# Patient Record
Sex: FEMALE | Race: WHITE | NOT HISPANIC OR LATINO | Employment: UNEMPLOYED | URBAN - METROPOLITAN AREA
[De-identification: names, ages, dates, MRNs, and addresses within clinical notes are randomized per-mention and may not be internally consistent; named-entity substitution may affect disease eponyms.]

---

## 2017-01-20 ENCOUNTER — TRANSCRIBE ORDERS (OUTPATIENT)
Dept: ADMINISTRATIVE | Facility: HOSPITAL | Age: 32
End: 2017-01-20

## 2017-01-20 DIAGNOSIS — N63.0 BREAST NODULE: Primary | ICD-10-CM

## 2017-01-26 ENCOUNTER — HOSPITAL ENCOUNTER (OUTPATIENT)
Dept: ULTRASOUND IMAGING | Facility: HOSPITAL | Age: 32
Discharge: HOME | End: 2017-01-26
Attending: NURSE PRACTITIONER | Admitting: NURSE PRACTITIONER

## 2017-01-26 DIAGNOSIS — N63.0 BREAST NODULE: ICD-10-CM

## 2017-01-26 PROCEDURE — 76642 ULTRASOUND BREAST LIMITED: CPT | Performed by: RADIOLOGY

## 2017-01-26 PROCEDURE — 76642 ULTRASOUND BREAST LIMITED: CPT

## 2017-02-16 ENCOUNTER — TRANSCRIBE ORDERS (OUTPATIENT)
Dept: LAB | Facility: HOSPITAL | Age: 32
End: 2017-02-16

## 2017-02-16 ENCOUNTER — LAB (OUTPATIENT)
Dept: LAB | Facility: HOSPITAL | Age: 32
End: 2017-02-16

## 2017-02-16 DIAGNOSIS — Z34.83 PRENATAL CARE, SUBSEQUENT PREGNANCY, THIRD TRIMESTER: Primary | ICD-10-CM

## 2017-02-16 DIAGNOSIS — Z34.83 PRENATAL CARE, SUBSEQUENT PREGNANCY, THIRD TRIMESTER: ICD-10-CM

## 2017-02-16 LAB
BLD GP AB SCN SERPL QL: NEGATIVE
DEPRECATED RDW RBC AUTO: 47.2 FL (ref 37–54)
ERYTHROCYTE [DISTWIDTH] IN BLOOD BY AUTOMATED COUNT: 13.7 % (ref 11.3–14.5)
GLUCOSE 1H P 100 G GLC PO SERPL-MCNC: 178 MG/DL (ref 65–199)
HCT VFR BLD AUTO: 35.9 % (ref 34.5–44)
HGB BLD-MCNC: 11.7 G/DL (ref 11.5–15.5)
MCH RBC QN AUTO: 30.5 PG (ref 27–31)
MCHC RBC AUTO-ENTMCNC: 32.6 G/DL (ref 32–36)
MCV RBC AUTO: 93.5 FL (ref 80–99)
PLATELET # BLD AUTO: 236 10*3/MM3 (ref 150–450)
PMV BLD AUTO: 10.1 FL (ref 6–12)
RBC # BLD AUTO: 3.84 10*6/MM3 (ref 3.89–5.14)
WBC NRBC COR # BLD: 8.92 10*3/MM3 (ref 3.5–10.8)

## 2017-02-16 PROCEDURE — 85027 COMPLETE CBC AUTOMATED: CPT | Performed by: NURSE PRACTITIONER

## 2017-02-16 PROCEDURE — 86850 RBC ANTIBODY SCREEN: CPT

## 2017-02-16 PROCEDURE — 82950 GLUCOSE TEST: CPT | Performed by: NURSE PRACTITIONER

## 2017-02-16 PROCEDURE — 36415 COLL VENOUS BLD VENIPUNCTURE: CPT

## 2017-02-23 ENCOUNTER — TRANSCRIBE ORDERS (OUTPATIENT)
Dept: LAB | Facility: HOSPITAL | Age: 32
End: 2017-02-23

## 2017-02-23 ENCOUNTER — LAB (OUTPATIENT)
Dept: LAB | Facility: HOSPITAL | Age: 32
End: 2017-02-23

## 2017-02-23 DIAGNOSIS — O99.810 ABNORMAL MATERNAL GLUCOSE TOLERANCE, ANTEPARTUM: Primary | ICD-10-CM

## 2017-02-23 DIAGNOSIS — O99.810 ABNORMAL MATERNAL GLUCOSE TOLERANCE, ANTEPARTUM: ICD-10-CM

## 2017-02-23 LAB
GLUCOSE 1H P 100 G GLC PO SERPL-MCNC: 214 MG/DL (ref 65–199)
GLUCOSE 2H P 100 G GLC PO SERPL-MCNC: 168 MG/DL (ref 65–139)
GLUCOSE 3H P 100 G GLC PO SERPL-MCNC: 145 MG/DL (ref 65–109)
GLUCOSE BLDC-MCNC: 111 MG/DL (ref 75–125)
GLUCOSE P FAST SERPL-MCNC: 116 MG/DL (ref 65–99)

## 2017-02-23 PROCEDURE — 82952 GTT-ADDED SAMPLES: CPT | Performed by: ADVANCED PRACTICE MIDWIFE

## 2017-02-23 PROCEDURE — 36415 COLL VENOUS BLD VENIPUNCTURE: CPT

## 2017-02-23 PROCEDURE — 82962 GLUCOSE BLOOD TEST: CPT | Performed by: ADVANCED PRACTICE MIDWIFE

## 2017-02-23 PROCEDURE — 82951 GLUCOSE TOLERANCE TEST (GTT): CPT | Performed by: ADVANCED PRACTICE MIDWIFE

## 2017-02-24 ENCOUNTER — TRANSCRIBE ORDERS (OUTPATIENT)
Dept: ENDOCRINOLOGY | Facility: CLINIC | Age: 32
End: 2017-02-24

## 2017-02-24 DIAGNOSIS — O24.419 GESTATIONAL DIABETES MELLITUS IN PREGNANCY, UNSPECIFIED CONTROL: Primary | ICD-10-CM

## 2017-02-27 ENCOUNTER — TELEPHONE (OUTPATIENT)
Dept: INTERNAL MEDICINE | Facility: CLINIC | Age: 32
End: 2017-02-27

## 2017-02-27 ENCOUNTER — OFFICE VISIT (OUTPATIENT)
Dept: ENDOCRINOLOGY | Facility: CLINIC | Age: 32
End: 2017-02-27

## 2017-02-27 VITALS — DIASTOLIC BLOOD PRESSURE: 74 MMHG | SYSTOLIC BLOOD PRESSURE: 126 MMHG | HEART RATE: 97 BPM | OXYGEN SATURATION: 98 %

## 2017-02-27 DIAGNOSIS — N63.0 BREAST LUMP IN FEMALE: ICD-10-CM

## 2017-02-27 DIAGNOSIS — O24.419 GESTATIONAL DIABETES MELLITUS (GDM), ANTEPARTUM, GESTATIONAL DIABETES METHOD OF CONTROL UNSPECIFIED: Primary | ICD-10-CM

## 2017-02-27 DIAGNOSIS — O24.410 DIET CONTROLLED GESTATIONAL DIABETES MELLITUS (GDM) IN THIRD TRIMESTER: ICD-10-CM

## 2017-02-27 PROBLEM — K21.9 GASTROESOPHAGEAL REFLUX DISEASE WITHOUT ESOPHAGITIS: Status: ACTIVE | Noted: 2017-02-27

## 2017-02-27 PROBLEM — F32.A DEPRESSION: Status: ACTIVE | Noted: 2017-02-27

## 2017-02-27 PROBLEM — M19.90 ARTHRITIS: Status: ACTIVE | Noted: 2017-02-27

## 2017-02-27 PROBLEM — G43.009 MIGRAINE WITHOUT AURA AND WITHOUT STATUS MIGRAINOSUS, NOT INTRACTABLE: Status: ACTIVE | Noted: 2017-02-27

## 2017-02-27 PROBLEM — O26.52 MATERNAL HYPOTENSION SYNDROME IN SECOND TRIMESTER: Status: ACTIVE | Noted: 2017-02-27

## 2017-02-27 PROBLEM — M65.4 DE QUERVAIN'S DISEASE (RADIAL STYLOID TENOSYNOVITIS): Status: ACTIVE | Noted: 2017-02-27

## 2017-02-27 PROBLEM — L30.1 CHEIROPODOPOMPHOLYX: Status: ACTIVE | Noted: 2017-02-27

## 2017-02-27 PROBLEM — D64.9 ANEMIA: Status: ACTIVE | Noted: 2017-02-27

## 2017-02-27 LAB — HBA1C MFR BLD: 5.4 %

## 2017-02-27 PROCEDURE — 83036 HEMOGLOBIN GLYCOSYLATED A1C: CPT | Performed by: INTERNAL MEDICINE

## 2017-02-27 PROCEDURE — 99243 OFF/OP CNSLTJ NEW/EST LOW 30: CPT | Performed by: INTERNAL MEDICINE

## 2017-02-27 RX ORDER — BLOOD-GLUCOSE METER
EACH MISCELLANEOUS
Qty: 1 KIT | Refills: 0 | Status: SHIPPED | OUTPATIENT
Start: 2017-02-27 | End: 2019-10-23

## 2017-02-27 RX ORDER — LANCETS 33 GAUGE
EACH MISCELLANEOUS
Qty: 100 EACH | Refills: 5 | Status: SHIPPED | OUTPATIENT
Start: 2017-02-27 | End: 2017-02-27

## 2017-02-27 RX ORDER — LANCETS
EACH MISCELLANEOUS
Qty: 100 EACH | Refills: 5 | Status: SHIPPED | OUTPATIENT
Start: 2017-02-27 | End: 2019-10-23

## 2017-02-27 RX ORDER — CHLORPHENIR/PHENYLEPH/ASPIRIN 2-7.8-325
1 TABLET, EFFERVESCENT ORAL DAILY
Qty: 50 STRIP | Refills: 1 | Status: SHIPPED | OUTPATIENT
Start: 2017-02-27 | End: 2017-04-23 | Stop reason: HOSPADM

## 2017-02-27 NOTE — ASSESSMENT & PLAN NOTE
She will start testing sugars and follow diet  She will email sugar starting Sunday  Goals and rationale for goals discussed  Large first child but went post dates  Blood sugar average 5.4% reviewed with patient   Risk of higher sugars to her and baby discussed

## 2017-02-27 NOTE — TELEPHONE ENCOUNTER
----- Message from Ying Workman sent at 2/27/2017  2:33 PM EST -----  Contact: PATIENT   RE: PA FOR TEST STRIPS    FYI-    PATIENT CALLED STATING THAT HER PHARMACY WILL BE SENDING FOR A PA ON THE TEST STRIPS THAT DR BROOKS ORDERED TODAY. THE LANCETS WERE APPROVED BUT THE TEST STRIPS WERE NOT.

## 2017-02-27 NOTE — PROGRESS NOTES
Maren Ray 31 y.o.  [facility]:Establish Care (New patient being seen at the request of Treva Clemens CNM for a consultation regarding gestational diabetes, [facility] 2017)    [place]: Establish Care (New patient being seen at the request of Treva Clemens CNM for a consultation regarding gestational diabetes, [facility] 2017)    Blood sugars reviewed  Results for orders placed or performed in visit on 17   POC Glycosylated Hemoglobin (Hb A1C)   Result Value Ref Range    Hemoglobin A1C 5.4 %     High fasting and 1 hour, 2 hour elevations   Has family h/o DM in Father and Brother  Breast lump noted on ROS- last mamm - currently pregnant   Is  - first child 9 lb 5 oz post dates   We discussed the diagnosis of gestational diabetes and reviewed her glucose levels/ glucose tolerance test.  We discussed the concept of carbohydrate awaredness and carbohydrate content of meals was discussed.  Impact of sweets and other carb containing foods and types of foods typically high in carbohydrates was discussed. Overall guidelines for meal planning related to specific carbohydrate content of meals was discussed and she was provided recommendations about carbohydrates recommended with meals and with snacks.  She was asked to give up any sugared drinks, and avoid breakfast cereal.  Blood sugar testing fasting and after each meal was reviewed and the patient was taught to use a glucometer to test her blood sugar.  Normal values for blood sugar monitoring were discussed as well, with fasting level less than 95, 1 hour pp blood sugar less than 140 and 2 hour blood sugar less than 120 recommended.  Risks related to poor control of blood sugars during pregnancy were discussed with a focus on specific risks to both her and the baby.  Risks discussed include macrosomia (large birthweight), fetal lung immaturity with respiratory distress and low oxygen level, stillbirth, low blood sugar after delivery, high bilirubin/jaundice, and  hypocalcemia.  Use of medications during pregnancy (eg metformin, glyburide and insulin) was discussed.  Her long term risks (outside of pregnancy) for development of Diabetes Mellitus were reviewed and we discussed the importance of healthy lifestyle now and in the future to help reduce the risk of progression to diabetes.  She will begin testing sugars fasting and 2 hours after meals and will fax blood sugars weekly to [email or web address]  We will plan to see her back in 3-4 weeks, or sooner if problems or questions.  Thank you for this referral and please do not hesitate to call for any problems or questions.      Allergies   Allergen Reactions   • Sulfa Antibiotics Angioedema       Current Outpatient Prescriptions:   •  Acetone, Urine, Test (KETONE TEST) strip, 1 strip by In Vitro route Daily. Test urine ketones daily in AM, Disp: 50 strip, Rfl: 1  •  glucose blood (ONETOUCH VERIO) test strip, Test blood sugars QID, Disp: 150 each, Rfl: 5  •  ONETOUCH DELICA LANCETS 33G misc, Test blood sugars QID, Disp: 100 each, Rfl: 5  Patient Active Problem List    Diagnosis   • De Quervain's disease (radial styloid tenosynovitis) [M65.4]     Overview Note:     Description: Continue thumb spica splint. Will send prescription for diclofenac. Patient defers referral to orthopedic hand. If pain persists she can call the clinic and we will refer to ortho hand.     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]   • Gastroesophageal reflux disease without esophagitis [K21.9]   • Anemia [D64.9]   • Arthritis [M19.90]   • Depression [F32.9]   • Migraine without aura and without status migrainosus, not intractable [G43.009]   • Maternal hypotension syndrome in second trimester [O26.52]   • Diet controlled gestational diabetes mellitus (GDM) in third trimester [O24.410]     Assessment & Plan Note:     She will start testing sugars and follow diet  She will email sugar starting Sunday  Goals and rationale for goals discussed  Large first child  but went post dates  Blood sugar average 5.4% reviewed with patient   Risk of higher sugars to her and baby discussed     • Breast lump in female [N63]     Assessment & Plan Note:     During pregnancy- is s/p u/s       Review of Systems   Constitutional: Positive for fatigue. Negative for activity change, appetite change, chills, diaphoresis, fever and unexpected weight change.        Poor appetite   HENT: Negative for congestion, dental problem, drooling, ear discharge, ear pain, facial swelling, hearing loss, mouth sores, nosebleeds, postnasal drip, rhinorrhea, sinus pressure, sneezing, sore throat, tinnitus, trouble swallowing and voice change.    Eyes: Negative for photophobia, pain, discharge, redness, itching and visual disturbance.        Dry eyes   Respiratory: Negative for apnea, cough, choking, chest tightness, shortness of breath, wheezing and stridor.    Cardiovascular: Positive for leg swelling. Negative for chest pain and palpitations.        Leg cramps, palpitations    Gastrointestinal: Positive for abdominal pain, constipation, diarrhea and nausea. Negative for abdominal distention, anal bleeding, blood in stool, rectal pain and vomiting.        Colonoscopy 2011   Endocrine: Negative for cold intolerance, heat intolerance, polydipsia, polyphagia and polyuria.   Genitourinary: Positive for menstrual problem. Negative for decreased urine volume, difficulty urinating, dysuria, enuresis, flank pain, frequency, genital sores, hematuria and urgency.        31 weeks pregnant  Lump in right breast - more prominent - had u/s sev weeks ago- prominent but due to glandular tissue    Musculoskeletal: Positive for arthralgias and myalgias. Negative for back pain, gait problem, joint swelling, neck pain and neck stiffness.   Skin: Negative for color change, pallor, rash and wound.   Allergic/Immunologic: Negative for environmental allergies, food allergies and immunocompromised state.   Neurological: Positive for  weakness and headaches. Negative for dizziness, tremors, seizures, syncope, facial asymmetry, speech difficulty, light-headedness and numbness.        Dizziness   Hematological: Negative for adenopathy. Does not bruise/bleed easily.        Easy bruising    Psychiatric/Behavioral: Negative for agitation, behavioral problems, confusion, decreased concentration, dysphoric mood, hallucinations, self-injury, sleep disturbance and suicidal ideas. The patient is not nervous/anxious and is not hyperactive.      Social History     Social History   • Marital status:      Spouse name: N/A   • Number of children: N/A   • Years of education: N/A     Occupational History   • Not on file.     Social History Main Topics   • Smoking status: Never Smoker   • Smokeless tobacco: Never Used   • Alcohol use No   • Drug use: No   • Sexual activity: Defer     Other Topics Concern   • Not on file     Social History Narrative   • No narrative on file     Family History   Problem Relation Age of Onset   • Stroke Mother    • Hypotension Mother    • Cancer Father    • Diabetes Father    • Hypertension Father    • Diabetes Brother    • Bipolar disorder Brother    • Cancer Paternal Grandmother    • Heart disease Paternal Grandmother    • Ovarian cancer Paternal Grandmother 70   • Diabetes Paternal Grandfather    • Cancer Paternal Grandfather      Visit Vitals   • /74   • Pulse 97   • LMP  (LMP Unknown)   • SpO2 98%     Physical Exam   Constitutional: She is oriented to person, place, and time. She appears well-developed and well-nourished.   HENT:   Head: Normocephalic and atraumatic.   Nose: Nose normal.   Mouth/Throat: Oropharynx is clear and moist.   Eyes: Conjunctivae, EOM and lids are normal. Pupils are equal, round, and reactive to light.   Neck: Trachea normal and normal range of motion. Neck supple. Carotid bruit is not present. No tracheal deviation present. No thyroid mass and no thyromegaly present.   Cardiovascular:  Normal rate, regular rhythm, normal heart sounds and intact distal pulses.  Exam reveals no gallop and no friction rub.    No murmur heard.  Pulmonary/Chest: Effort normal and breath sounds normal. No respiratory distress. She has no wheezes.   Musculoskeletal: Normal range of motion. She exhibits no edema or deformity.   Lymphadenopathy:     She has no cervical adenopathy.   Neurological: She is alert and oriented to person, place, and time. She has normal reflexes. She displays normal reflexes. No cranial nerve deficit.   Skin: Skin is warm and dry. No rash noted. No cyanosis or erythema. Nails show no clubbing.   Psychiatric: She has a normal mood and affect. Her speech is normal and behavior is normal. Judgment and thought content normal. Cognition and memory are normal.   Nursing note and vitals reviewed.    Results for orders placed or performed in visit on 02/27/17   POC Glycosylated Hemoglobin (Hb A1C)   Result Value Ref Range    Hemoglobin A1C 5.4 %     Problem List Items Addressed This Visit        Endocrine    Diet controlled gestational diabetes mellitus (GDM) in third trimester     She will start testing sugars and follow diet  She will email sugar starting Sunday  Goals and rationale for goals discussed  Large first child but went post dates  Blood sugar average 5.4% reviewed with patient   Risk of higher sugars to her and baby discussed            Other    Breast lump in female     During pregnancy- is s/p u/s           Other Visit Diagnoses     Gestational diabetes mellitus (GDM), antepartum, gestational diabetes method of control unspecified    -  Primary    Relevant Orders    POC Glycosylated Hemoglobin (Hb A1C) (Completed)        Return in about 4 weeks (around 3/27/2017) for Recheck 30 min .      Dulce Rodriguez MD

## 2017-03-08 ENCOUNTER — TELEPHONE (OUTPATIENT)
Dept: ENDOCRINOLOGY | Facility: CLINIC | Age: 32
End: 2017-03-08

## 2017-03-08 RX ORDER — GLYBURIDE 2.5 MG/1
TABLET ORAL
Qty: 90 TABLET | Refills: 3 | Status: SHIPPED | OUTPATIENT
Start: 2017-03-08 | End: 2017-04-23 | Stop reason: HOSPADM

## 2017-03-16 ENCOUNTER — LAB (OUTPATIENT)
Dept: LAB | Facility: HOSPITAL | Age: 32
End: 2017-03-16

## 2017-03-16 ENCOUNTER — TRANSCRIBE ORDERS (OUTPATIENT)
Dept: LAB | Facility: HOSPITAL | Age: 32
End: 2017-03-16

## 2017-03-16 DIAGNOSIS — Z3A.33 33 WEEKS GESTATION OF PREGNANCY: ICD-10-CM

## 2017-03-16 DIAGNOSIS — Z34.83 PRENATAL CARE, SUBSEQUENT PREGNANCY, THIRD TRIMESTER: ICD-10-CM

## 2017-03-16 DIAGNOSIS — Z3A.33 33 WEEKS GESTATION OF PREGNANCY: Primary | ICD-10-CM

## 2017-03-16 LAB — TSH SERPL DL<=0.05 MIU/L-ACNC: 1.82 MIU/ML (ref 0.35–5.35)

## 2017-03-16 PROCEDURE — 36415 COLL VENOUS BLD VENIPUNCTURE: CPT

## 2017-03-16 PROCEDURE — 84443 ASSAY THYROID STIM HORMONE: CPT | Performed by: NURSE PRACTITIONER

## 2017-03-22 PROBLEM — L30.1 DYSHIDROTIC ECZEMA: Status: ACTIVE | Noted: 2017-03-22

## 2017-03-31 ENCOUNTER — OFFICE VISIT (OUTPATIENT)
Dept: ENDOCRINOLOGY | Facility: CLINIC | Age: 32
End: 2017-03-31

## 2017-03-31 VITALS
SYSTOLIC BLOOD PRESSURE: 110 MMHG | DIASTOLIC BLOOD PRESSURE: 72 MMHG | OXYGEN SATURATION: 98 % | HEART RATE: 85 BPM | BODY MASS INDEX: 45.99 KG/M2 | WEIGHT: 293 LBS | HEIGHT: 67 IN

## 2017-03-31 DIAGNOSIS — O24.410 DIET CONTROLLED GESTATIONAL DIABETES MELLITUS (GDM) IN THIRD TRIMESTER: Primary | ICD-10-CM

## 2017-03-31 PROCEDURE — 99213 OFFICE O/P EST LOW 20 MIN: CPT | Performed by: INTERNAL MEDICINE

## 2017-03-31 NOTE — ASSESSMENT & PLAN NOTE
Fasting and pp sugars are higher related to recent enteritis  Discussed titration of glyburide and goal no more than 2 over goal per week  email Sunday with update and discussed increase to 2.5 mg at breakfast and also as needed for higher sugar   email weekly - f/u 10 weeks

## 2017-03-31 NOTE — PROGRESS NOTES
Maren Ray 31 y.o.  CC:Gestational Diabetes (LANCE 4-, OB:  Treva Clemens CNM and Ani Mart MD)    [place]: Gestational Diabetes (LANCE 4-, OB:  Treva Clemens CNM and Ani Mart MD)    Blood sugars are higher- madelin pp breakfast  Had stomach virus on Sunday- sugars have been higher assoc with this  Discussed increase glyburide to 2 pills if after breakfast continues to be elevatedAnswers for HPI/ROS submitted by the patient on 3/29/2017   Diabetes problem  Diabetes type: gestational  MedicAlert ID: No  Disease duration: 5 weeks  blurred vision: No  foot paresthesias: No  foot ulcerations: No  visual change: No  weight loss: No  Symptom course: stable  hunger: Yes  mood changes: Yes  sweats: Yes  blackouts: No  hospitalization: No  nocturnal hypoglycemia: No  required assistance: Yes  required glucagon: No  CVA: No  heart disease: No  impotence: No  nephropathy: No  peripheral neuropathy: No  PVD: No  retinopathy: No  CAD risks: family history, obesity, stress  Current treatments: diet, oral agent (monotherapy)  Treatment compliance: most of the time  Home blood tests: 5+ x per day  Home urines: 1-2 x per day  Monitoring compliance: excellent  Blood glucose trend: fluctuating minimally  breakfast time: 7-8 am  breakfast glucose level:   lunch time: 2-3 pm  lunch glucose level:   dinner time: after 8 pm  dinner glucose level:   High score: 110-130  Overall:   Weight trend: fluctuating minimally  Current diet: diabetic  Meal planning: avoidance of concentrated sweets, carbohydrate counting  Exercise: three times a week  Dietitian visit: No  Eye exam current: Yes  Sees podiatrist: No    Allergies   Allergen Reactions   • Sulfa Antibiotics Angioedema       Current Outpatient Prescriptions:   •  ACCU-CHEK FASTCLIX LANCETS misc, Test blood sugars QID, Disp: 100 each, Rfl: 5  •  Acetone, Urine, Test (KETONE TEST) strip, 1 strip by In Vitro route Daily. Test urine ketones daily  in AM, Disp: 50 strip, Rfl: 1  •  Blood Glucose Monitoring Suppl (ACCU-CHEK [place] PLUS) W/DEVICE kit, Use to test blood sugars, Disp: 1 kit, Rfl: 0  •  glucose blood (ACCU-CHEK EDWARD PLUS) test strip, Test blood sugars QID, Disp: 150 each, Rfl: 5  •  glucose blood (ONETOUCH VERIO) test strip, Test blood sugars QID, Disp: 150 each, Rfl: 5  •  glyBURIDE (DIAbeta) 2.5 MG tablet, Take 1/2 pill tid before meals, Disp: 90 tablet, Rfl: 3  Patient Active Problem List    Diagnosis   • Dyshidrotic eczema [L30.1]   • De Quervain's disease (radial styloid tenosynovitis) [M65.4]     Overview Note:     Description: Continue thumb spica splint. Will send prescription for diclofenac. Patient defers referral to orthopedic hand. If pain persists she can call the clinic and we will refer to ortho hand.     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]   • Gastroesophageal reflux disease without esophagitis [K21.9]   • Anemia [D64.9]   • Arthritis [M19.90]   • Depression [F32.9]   • Migraine without aura and without status migrainosus, not intractable [G43.009]   • Maternal hypotension syndrome in second trimester [O26.52]   • Diet controlled gestational diabetes mellitus (GDM) in third trimester [O24.410]     Assessment & Plan Note:     Fasting and pp sugars are higher related to recent enteritis  Discussed titration of glyburide and goal no more than 2 over goal per week  email Sunday with update and discussed increase to 2.5 mg at breakfast and also as needed for higher sugar   email weekly - f/u 10 weeks      • Breast lump in female [N63]     Review of Systems   Constitutional: Negative for activity change, appetite change, chills, diaphoresis, fatigue, fever and unexpected weight change.   HENT: Negative for congestion, dental problem, drooling, ear discharge, ear pain, facial swelling, hearing loss, mouth sores, nosebleeds, postnasal drip, rhinorrhea, sinus pressure, sneezing, sore throat, tinnitus, trouble swallowing and voice change.    Eyes:  Negative for photophobia, pain, discharge, redness, itching and visual disturbance.   Respiratory: Negative for apnea, cough, choking, chest tightness, shortness of breath, wheezing and stridor.    Cardiovascular: Negative for chest pain, palpitations and leg swelling.   Gastrointestinal: Negative for abdominal distention, abdominal pain, anal bleeding, blood in stool, constipation, diarrhea, nausea, rectal pain and vomiting.   Endocrine: Negative for cold intolerance, heat intolerance, polydipsia, polyphagia and polyuria.   Genitourinary: Negative for decreased urine volume, difficulty urinating, dysuria, enuresis, flank pain, frequency, genital sores, hematuria and urgency.   Musculoskeletal: Negative for arthralgias, back pain, gait problem, joint swelling, myalgias, neck pain and neck stiffness.   Skin: Positive for pallor. Negative for color change, rash and wound.   Allergic/Immunologic: Negative for environmental allergies, food allergies and immunocompromised state.   Neurological: Positive for dizziness, tremors and headaches. Negative for seizures, syncope, facial asymmetry, speech difficulty, weakness, light-headedness and numbness.   Hematological: Negative for adenopathy. Does not bruise/bleed easily.   Psychiatric/Behavioral: Negative for agitation, behavioral problems, confusion, decreased concentration, dysphoric mood, hallucinations, self-injury, sleep disturbance and suicidal ideas. The patient is nervous/anxious. The patient is not hyperactive.      Social History     Social History   • Marital status:      Spouse name: N/A   • Number of children: N/A   • Years of education: N/A     Occupational History   • Not on file.     Social History Main Topics   • Smoking status: Never Smoker   • Smokeless tobacco: Never Used   • Alcohol use No   • Drug use: No   • Sexual activity: Defer     Other Topics Concern   • Not on file     Social History Narrative     Family History   Problem Relation Age of  "Onset   • Stroke Mother    • Hypotension Mother    • Cancer Father    • Diabetes Father    • Hypertension Father    • Diabetes Brother    • Bipolar disorder Brother    • Cancer Paternal Grandmother    • Heart disease Paternal Grandmother    • Ovarian cancer Paternal Grandmother 70   • Diabetes Paternal Grandfather    • Cancer Paternal Grandfather      /72  Pulse 85  Ht 67\" (170.2 cm)  Wt (!) 325 lb (147 kg)  LMP  (LMP Unknown)  SpO2 98%  BMI 50.9 kg/m2  Physical Exam   Constitutional: She is oriented to person, place, and time. She appears well-developed and well-nourished.   HENT:   Head: Normocephalic and atraumatic.   Nose: Nose normal.   Mouth/Throat: Oropharynx is clear and moist.   Eyes: Conjunctivae, EOM and lids are normal. Pupils are equal, round, and reactive to light.   Neck: Trachea normal and normal range of motion. Neck supple. Carotid bruit is not present. No tracheal deviation present. No thyroid mass and no thyromegaly present.   Cardiovascular: Normal rate, regular rhythm, normal heart sounds and intact distal pulses.  Exam reveals no gallop and no friction rub.    No murmur heard.  Pulmonary/Chest: Effort normal and breath sounds normal. No respiratory distress. She has no wheezes.   Musculoskeletal: Normal range of motion. She exhibits no edema or deformity.   Lymphadenopathy:     She has no cervical adenopathy.   Neurological: She is alert and oriented to person, place, and time. She has normal reflexes. She displays normal reflexes. No cranial nerve deficit.   Skin: Skin is warm and dry. No rash noted. No cyanosis or erythema. Nails show no clubbing.   Psychiatric: She has a normal mood and affect. Her speech is normal and behavior is normal. Judgment and thought content normal. Cognition and memory are normal.   Nursing note and vitals reviewed.    Results for orders placed or performed in visit on 03/16/17   TSH   Result Value Ref Range    TSH 1.819 0.350 - 5.350 mIU/mL "     Problem List Items Addressed This Visit        Endocrine    Diet controlled gestational diabetes mellitus (GDM) in third trimester - Primary     Fasting and pp sugars are higher related to recent enteritis  Discussed titration of glyburide and goal no more than 2 over goal per week  email Sunday with update and discussed increase to 2.5 mg at breakfast and also as needed for higher sugar   email weekly - f/u 10 weeks              Return in about 10 weeks (around 6/9/2017) for Recheck 30 min .    Dulce Rodriguez MD

## 2017-04-20 PROBLEM — Z34.90 PATIENT CURRENTLY PREGNANT: Status: ACTIVE | Noted: 2017-04-20

## 2017-04-20 LAB
ABO GROUP BLD: NORMAL
BLD GP AB SCN SERPL QL: NEGATIVE
DEPRECATED RDW RBC AUTO: 47.8 FL (ref 37–54)
ERYTHROCYTE [DISTWIDTH] IN BLOOD BY AUTOMATED COUNT: 14.2 % (ref 11.3–14.5)
GLUCOSE BLDC GLUCOMTR-MCNC: 80 MG/DL (ref 70–130)
HCT VFR BLD AUTO: 36.6 % (ref 34.5–44)
HGB BLD-MCNC: 11.5 G/DL (ref 11.5–15.5)
MCH RBC QN AUTO: 29 PG (ref 27–31)
MCHC RBC AUTO-ENTMCNC: 31.4 G/DL (ref 32–36)
MCV RBC AUTO: 92.4 FL (ref 80–99)
PLATELET # BLD AUTO: 255 10*3/MM3 (ref 150–450)
PMV BLD AUTO: 10.5 FL (ref 6–12)
RBC # BLD AUTO: 3.96 10*6/MM3 (ref 3.89–5.14)
RH BLD: POSITIVE
WBC NRBC COR # BLD: 8.63 10*3/MM3 (ref 3.5–10.8)

## 2017-04-20 PROCEDURE — 86850 RBC ANTIBODY SCREEN: CPT | Performed by: NURSE PRACTITIONER

## 2017-04-20 PROCEDURE — 85027 COMPLETE CBC AUTOMATED: CPT | Performed by: NURSE PRACTITIONER

## 2017-04-20 PROCEDURE — 86901 BLOOD TYPING SEROLOGIC RH(D): CPT | Performed by: NURSE PRACTITIONER

## 2017-04-20 PROCEDURE — 86900 BLOOD TYPING SEROLOGIC ABO: CPT | Performed by: NURSE PRACTITIONER

## 2017-04-20 PROCEDURE — 82962 GLUCOSE BLOOD TEST: CPT

## 2017-04-20 RX ORDER — OXYTOCIN/RINGER'S LACTATE 30/500 ML
2-24 PLASTIC BAG, INJECTION (ML) INTRAVENOUS
Status: DISCONTINUED | OUTPATIENT
Start: 2017-04-21 | End: 2017-04-21 | Stop reason: HOSPADM

## 2017-04-20 RX ORDER — PRENATAL WITH FERROUS FUM AND FOLIC ACID 3080; 920; 120; 400; 22; 1.84; 3; 20; 10; 1; 12; 200; 27; 25; 2 [IU]/1; [IU]/1; MG/1; [IU]/1; MG/1; MG/1; MG/1; MG/1; MG/1; MG/1; UG/1; MG/1; MG/1; MG/1; MG/1
1 TABLET ORAL DAILY
COMMUNITY
End: 2019-10-23

## 2017-04-20 RX ORDER — SODIUM CHLORIDE, SODIUM LACTATE, POTASSIUM CHLORIDE, CALCIUM CHLORIDE 600; 310; 30; 20 MG/100ML; MG/100ML; MG/100ML; MG/100ML
125 INJECTION, SOLUTION INTRAVENOUS CONTINUOUS
Status: DISCONTINUED | OUTPATIENT
Start: 2017-04-20 | End: 2017-04-21

## 2017-04-20 RX ORDER — MORPHINE SULFATE 4 MG/ML
2 INJECTION, SOLUTION INTRAMUSCULAR; INTRAVENOUS
Status: DISCONTINUED | OUTPATIENT
Start: 2017-04-20 | End: 2017-04-21 | Stop reason: HOSPADM

## 2017-04-20 RX ORDER — SODIUM CHLORIDE 0.9 % (FLUSH) 0.9 %
1-10 SYRINGE (ML) INJECTION AS NEEDED
Status: DISCONTINUED | OUTPATIENT
Start: 2017-04-20 | End: 2017-04-21 | Stop reason: HOSPADM

## 2017-04-20 RX ADMIN — SODIUM CHLORIDE, POTASSIUM CHLORIDE, SODIUM LACTATE AND CALCIUM CHLORIDE 125 ML/HR: 600; 310; 30; 20 INJECTION, SOLUTION INTRAVENOUS at 22:06

## 2017-04-21 ENCOUNTER — ANESTHESIA (OUTPATIENT)
Dept: LABOR AND DELIVERY | Facility: HOSPITAL | Age: 32
End: 2017-04-21

## 2017-04-21 ENCOUNTER — ANESTHESIA EVENT (OUTPATIENT)
Dept: LABOR AND DELIVERY | Facility: HOSPITAL | Age: 32
End: 2017-04-21

## 2017-04-21 LAB
GLUCOSE BLDC GLUCOMTR-MCNC: 61 MG/DL (ref 70–130)
GLUCOSE BLDC GLUCOMTR-MCNC: 67 MG/DL (ref 70–130)
GLUCOSE BLDC GLUCOMTR-MCNC: 76 MG/DL (ref 70–130)
GLUCOSE BLDC GLUCOMTR-MCNC: 80 MG/DL (ref 70–130)
GLUCOSE BLDC GLUCOMTR-MCNC: 84 MG/DL (ref 70–130)
GLUCOSE BLDC GLUCOMTR-MCNC: 85 MG/DL (ref 70–130)

## 2017-04-21 PROCEDURE — 10907ZC DRAINAGE OF AMNIOTIC FLUID, THERAPEUTIC FROM PRODUCTS OF CONCEPTION, VIA NATURAL OR ARTIFICIAL OPENING: ICD-10-PCS | Performed by: ADVANCED PRACTICE MIDWIFE

## 2017-04-21 PROCEDURE — 82962 GLUCOSE BLOOD TEST: CPT

## 2017-04-21 PROCEDURE — 0KQM0ZZ REPAIR PERINEUM MUSCLE, OPEN APPROACH: ICD-10-PCS | Performed by: ADVANCED PRACTICE MIDWIFE

## 2017-04-21 PROCEDURE — 25010000002 FENTANYL CITRATE (PF) 100 MCG/2ML SOLUTION: Performed by: NURSE ANESTHETIST, CERTIFIED REGISTERED

## 2017-04-21 PROCEDURE — 25010000002 ROPIVACAINE PER 1 MG: Performed by: NURSE ANESTHETIST, CERTIFIED REGISTERED

## 2017-04-21 PROCEDURE — C1755 CATHETER, INTRASPINAL: HCPCS | Performed by: ANESTHESIOLOGY

## 2017-04-21 PROCEDURE — C1755 CATHETER, INTRASPINAL: HCPCS

## 2017-04-21 PROCEDURE — 4A0H7CZ MEASUREMENT OF PRODUCTS OF CONCEPTION, CARDIAC RATE, VIA NATURAL OR ARTIFICIAL OPENING: ICD-10-PCS | Performed by: ADVANCED PRACTICE MIDWIFE

## 2017-04-21 PROCEDURE — 59025 FETAL NON-STRESS TEST: CPT

## 2017-04-21 RX ORDER — DIPHENHYDRAMINE HYDROCHLORIDE 50 MG/ML
12.5 INJECTION INTRAMUSCULAR; INTRAVENOUS EVERY 8 HOURS PRN
Status: DISCONTINUED | OUTPATIENT
Start: 2017-04-21 | End: 2017-04-21 | Stop reason: HOSPADM

## 2017-04-21 RX ORDER — FENTANYL CITRATE 50 UG/ML
INJECTION, SOLUTION INTRAMUSCULAR; INTRAVENOUS AS NEEDED
Status: DISCONTINUED | OUTPATIENT
Start: 2017-04-21 | End: 2017-04-21 | Stop reason: SURG

## 2017-04-21 RX ORDER — ZOLPIDEM TARTRATE 5 MG/1
5 TABLET ORAL NIGHTLY PRN
Status: DISCONTINUED | OUTPATIENT
Start: 2017-04-21 | End: 2017-04-23 | Stop reason: HOSPADM

## 2017-04-21 RX ORDER — METOCLOPRAMIDE HYDROCHLORIDE 5 MG/ML
10 INJECTION INTRAMUSCULAR; INTRAVENOUS ONCE AS NEEDED
Status: DISCONTINUED | OUTPATIENT
Start: 2017-04-21 | End: 2017-04-21 | Stop reason: HOSPADM

## 2017-04-21 RX ORDER — SODIUM CHLORIDE 0.9 % (FLUSH) 0.9 %
1-10 SYRINGE (ML) INJECTION AS NEEDED
Status: DISCONTINUED | OUTPATIENT
Start: 2017-04-21 | End: 2017-04-23 | Stop reason: HOSPADM

## 2017-04-21 RX ORDER — LANOLIN 100 %
OINTMENT (GRAM) TOPICAL
Status: DISCONTINUED | OUTPATIENT
Start: 2017-04-21 | End: 2017-04-23 | Stop reason: HOSPADM

## 2017-04-21 RX ORDER — ONDANSETRON 2 MG/ML
4 INJECTION INTRAMUSCULAR; INTRAVENOUS ONCE AS NEEDED
Status: DISCONTINUED | OUTPATIENT
Start: 2017-04-21 | End: 2017-04-21 | Stop reason: HOSPADM

## 2017-04-21 RX ORDER — IBUPROFEN 600 MG/1
600 TABLET ORAL EVERY 8 HOURS PRN
Status: DISCONTINUED | OUTPATIENT
Start: 2017-04-21 | End: 2017-04-23 | Stop reason: HOSPADM

## 2017-04-21 RX ORDER — OXYTOCIN/RINGER'S LACTATE 20/1000 ML
125 PLASTIC BAG, INJECTION (ML) INTRAVENOUS CONTINUOUS PRN
Status: DISCONTINUED | OUTPATIENT
Start: 2017-04-21 | End: 2017-04-21

## 2017-04-21 RX ORDER — BISACODYL 10 MG
10 SUPPOSITORY, RECTAL RECTAL DAILY PRN
Status: DISCONTINUED | OUTPATIENT
Start: 2017-04-22 | End: 2017-04-23 | Stop reason: HOSPADM

## 2017-04-21 RX ORDER — LIDOCAINE HYDROCHLORIDE AND EPINEPHRINE 15; 5 MG/ML; UG/ML
INJECTION, SOLUTION EPIDURAL AS NEEDED
Status: DISCONTINUED | OUTPATIENT
Start: 2017-04-21 | End: 2017-04-21 | Stop reason: SURG

## 2017-04-21 RX ORDER — EPHEDRINE SULFATE/0.9% NACL/PF 50 MG/10ML
5 SYRINGE (ML) INTRAVENOUS
Status: DISCONTINUED | OUTPATIENT
Start: 2017-04-21 | End: 2017-04-21 | Stop reason: HOSPADM

## 2017-04-21 RX ORDER — DOCUSATE SODIUM 100 MG/1
100 CAPSULE, LIQUID FILLED ORAL 2 TIMES DAILY
Status: DISCONTINUED | OUTPATIENT
Start: 2017-04-21 | End: 2017-04-23 | Stop reason: HOSPADM

## 2017-04-21 RX ORDER — MISOPROSTOL 200 UG/1
800 TABLET ORAL AS NEEDED
Status: DISCONTINUED | OUTPATIENT
Start: 2017-04-21 | End: 2017-04-23 | Stop reason: HOSPADM

## 2017-04-21 RX ORDER — CARBOPROST TROMETHAMINE 250 UG/ML
250 INJECTION, SOLUTION INTRAMUSCULAR AS NEEDED
Status: DISCONTINUED | OUTPATIENT
Start: 2017-04-21 | End: 2017-04-23 | Stop reason: HOSPADM

## 2017-04-21 RX ORDER — ONDANSETRON 4 MG/1
4 TABLET, FILM COATED ORAL EVERY 8 HOURS PRN
Status: DISCONTINUED | OUTPATIENT
Start: 2017-04-21 | End: 2017-04-23 | Stop reason: HOSPADM

## 2017-04-21 RX ORDER — OXYTOCIN/RINGER'S LACTATE 20/1000 ML
999 PLASTIC BAG, INJECTION (ML) INTRAVENOUS CONTINUOUS PRN
Status: DISCONTINUED | OUTPATIENT
Start: 2017-04-21 | End: 2017-04-21

## 2017-04-21 RX ORDER — FAMOTIDINE 10 MG/ML
20 INJECTION, SOLUTION INTRAVENOUS ONCE AS NEEDED
Status: DISCONTINUED | OUTPATIENT
Start: 2017-04-21 | End: 2017-04-21 | Stop reason: HOSPADM

## 2017-04-21 RX ORDER — METHYLERGONOVINE MALEATE 0.2 MG/ML
200 INJECTION INTRAVENOUS ONCE AS NEEDED
Status: DISCONTINUED | OUTPATIENT
Start: 2017-04-21 | End: 2017-04-23 | Stop reason: HOSPADM

## 2017-04-21 RX ADMIN — LIDOCAINE HYDROCHLORIDE AND EPINEPHRINE 3 ML: 15; 5 INJECTION, SOLUTION EPIDURAL at 10:51

## 2017-04-21 RX ADMIN — DOCUSATE SODIUM 100 MG: 100 CAPSULE, LIQUID FILLED ORAL at 18:15

## 2017-04-21 RX ADMIN — WITCH HAZEL 1 PAD: 500 SOLUTION RECTAL; TOPICAL at 18:15

## 2017-04-21 RX ADMIN — Medication 2 MILLI-UNITS/MIN: at 06:26

## 2017-04-21 RX ADMIN — SODIUM CHLORIDE, POTASSIUM CHLORIDE, SODIUM LACTATE AND CALCIUM CHLORIDE 1000 ML: 600; 310; 30; 20 INJECTION, SOLUTION INTRAVENOUS at 09:14

## 2017-04-21 RX ADMIN — IBUPROFEN 600 MG: 600 TABLET, FILM COATED ORAL at 18:16

## 2017-04-21 RX ADMIN — SODIUM CHLORIDE, POTASSIUM CHLORIDE, SODIUM LACTATE AND CALCIUM CHLORIDE 125 ML/HR: 600; 310; 30; 20 INJECTION, SOLUTION INTRAVENOUS at 11:28

## 2017-04-21 RX ADMIN — BENZOCAINE AND MENTHOL: 20; .5 SPRAY TOPICAL at 18:15

## 2017-04-21 RX ADMIN — LIDOCAINE HYDROCHLORIDE AND EPINEPHRINE 1 ML: 15; 5 INJECTION, SOLUTION EPIDURAL at 10:54

## 2017-04-21 RX ADMIN — Medication 999 ML/HR: at 14:49

## 2017-04-21 RX ADMIN — SODIUM CHLORIDE, POTASSIUM CHLORIDE, SODIUM LACTATE AND CALCIUM CHLORIDE 125 ML/HR: 600; 310; 30; 20 INJECTION, SOLUTION INTRAVENOUS at 13:37

## 2017-04-21 RX ADMIN — Medication: at 18:27

## 2017-04-21 RX ADMIN — ROPIVACAINE HYDROCHLORIDE 10 ML: 5 INJECTION, SOLUTION EPIDURAL; INFILTRATION; PERINEURAL at 10:58

## 2017-04-21 RX ADMIN — FENTANYL CITRATE 100 MCG: 50 INJECTION, SOLUTION INTRAMUSCULAR; INTRAVENOUS at 10:54

## 2017-04-21 RX ADMIN — ROPIVACAINE HYDROCHLORIDE 16 ML/HR: 5 INJECTION, SOLUTION EPIDURAL; INFILTRATION; PERINEURAL at 11:01

## 2017-04-21 RX ADMIN — SODIUM CHLORIDE, POTASSIUM CHLORIDE, SODIUM LACTATE AND CALCIUM CHLORIDE 1000 ML/HR: 600; 310; 30; 20 INJECTION, SOLUTION INTRAVENOUS at 09:40

## 2017-04-21 NOTE — PLAN OF CARE
Problem: Postpartum, Vaginal Delivery (Adult)  Goal: Signs and Symptoms of Listed Potential Problems Will be Absent or Manageable (Postpartum, Vaginal Delivery)  Outcome: Ongoing (interventions implemented as appropriate)    04/21/17 1503   Postpartum, Vaginal Delivery   Problems Assessed (Postpartum Vaginal Delivery) all   Problems Present (Postpartum Vaginal Delivery) none

## 2017-04-21 NOTE — ANESTHESIA PREPROCEDURE EVALUATION
Anesthesia Evaluation     Patient summary reviewed and Nursing notes reviewed   no history of anesthetic complications:     Airway   Mallampati: I  TM distance: >3 FB  Neck ROM: full  Dental      Pulmonary - negative pulmonary ROS   Cardiovascular - negative cardio ROS        Neuro/Psych  (+) headaches, psychiatric history Anxiety and Depression,    GI/Hepatic/Renal/Endo    (+) morbid obesity, GERD, diabetes mellitus (gestational),     Musculoskeletal     Abdominal    Substance History - negative use     OB/GYN    (+) Pregnant,         Other   (+) arthritis                                 Anesthesia Plan    ASA 3     epidural     Anesthetic plan and risks discussed with patient.

## 2017-04-21 NOTE — PLAN OF CARE
Problem: Patient Care Overview (Adult)  Goal: Plan of Care Review  Outcome: Ongoing (interventions implemented as appropriate)    04/21/17 1502   Coping/Psychosocial Response Interventions   Plan Of Care Reviewed With patient;spouse   Patient Care Overview   Progress progress toward functional goals as expected

## 2017-04-21 NOTE — PROGRESS NOTES
[facility]  Obstetric Progress Note    Subjective     Patient:    Resting without complaints    Objective     Vital Signs Range for the last 24 hours  Temp:  [97.6 °F (36.4 °C)-97.8 °F (36.6 °C)] 97.6 °F (36.4 °C)   Temp src: Oral   BP: (102-122)/(56-64) 102/56   Heart Rate:  [73-82] 73   Resp:  [16-18] 16               Weight:  [328 lb (149 kg)] 328 lb (149 kg)       Intake/Output this shift:         Physical Exam:      Abdomen Abdominal exam: soft, nontender, nondistended, no masses or organomegaly.   Extremities Exam of extremities: peripheral pulses normal, no pedal edema, no clubbing or cyanosis     Presentation: vertex   Cervix: Exam by: Method: sterile exam per CNM   Dilation: Dilation: 2   Effacement: Cervical Effacement: 40%   Station: Station: -2         Fetal Heart Rate Assessment   Method: Fetal HR Assessment Method: external   Beats/min: Fetal HR (Beats/Min): 120   Baseline: Fetal HR Baseline: normal range (110-160 bpm)   Varibility: Fetal HR Variability: moderate (amplitude range 6 to 25 bpm)   Accels: Fetal HR Accelerations: greater than/equal to 15 bpm, lasting at least 15 seconds   Decels: Fetal HR Decelerations: absent   Tracing Category:       Uterine Assessment   Method: Method: TOCO (external toco transducer)   Frequency (min): Contraction Frequency (min): no ctx noted   Ctx Count in 10 min: Contraction Frequency in 10min: 1   Duration: Contraction Duration (sec): 60   Intensity: Contraction Intensity: mild by palpation   Intensity by IUPC:     Resting Tone: Uterine Resting Tone: soft by palpation   Resting Tone by IUPC:     [place] Units:         Assessment/Plan     Active Problems:    Patient currently pregnant        Assessment:  1.  Intrauterine pregnancy at 38w5d weeks gestation with reactive fetal status.    2.  Induction of labor for oligohydramnios  3.  Obstetrical history significant for is non-contributory.  4.  GBS status: No results found for: GBSANTIGEN    Plan:  1. Zelaya bulb  dilator placed.  Patient tolerated well.   2.  Repeat SVE prn  3.   Plan of care has been reviewed with patient and she verbalizes understanding  4.  Risks, benefits of treatment plan have been discussed.  5.  All questions have been answered.        rTeva Clemens CNM  4/21/2017  3:25 AM

## 2017-04-21 NOTE — L&D DELIVERY NOTE
[facility]  Vaginal Delivery Note    Delivery     Delivery: Vaginal, Spontaneous Delivery     YOB: 2018    Time of Birth: 2:46 PM      Anesthesia: Epidural     Delivering clinician: Everett Sanderson    Forceps?   No   Vacuum? No    Shoulder dystocia present: No        Delivery narrative:  Patient pushed to deliver a viable male  over a 2nd degree laceration/bilateral periurethral. Spontaneous cry noted on exam. Cord clamped and cut. Per mother's request, baby was taken to infant warmer to be cleaned off. Laceration repaired and hemostasis obtained. Baby brought back to mother for skin-to-skin.     Infant    Findings: male  infant     Infant observations: Weight: 8 lb 1.3 oz (3.665 kg)   Length: 19  in  Observations/Comments:         Apgars:    @ 1 minute /       @ 5 minutes         Placenta, Cord, and Fluid    Placenta delivered  Spontaneous  at    2:49 PM     Cord: 3 vessels  present.   Nuchal Cord?  no   Cord blood obtained: Yes    Cord gases obtained:  No    Cord gas results: Venous:  @BABYNOHDR(BRIEFLAB, PHCVEN, BECVEN)@    Arterial:  @BABYNOHDR(BRIEFLAB, PHCART, BECART)@     Repair    Episiotomy: No   Lacerations: Yes  Laceration Information  Laceration Repaired?   Perineal: 2nd  Yes    Periurethral: bilateral  Yes    Labial:         Sulcus:         Vaginal: No       Cervical: No           Estimated Blood Loss: 300  mls.   Suture used for repair: 2-0 and 3-0 Vicryl Rapide         Complications  none    Disposition  Mother to Mother Baby/Postpartum  in stable condition currently.  Baby to remains with mom  in stable condition currently.      Everett Sanderson CNM  17  3:15 PM

## 2017-04-21 NOTE — ANESTHESIA PROCEDURE NOTES
Labor Epidural    Patient location during procedure: OB  Performed By  Anesthesiologist: SERG NIXON  CRNA: ANGELY DE JESUS  Preanesthetic Checklist  Completed: patient identified, surgical consent, pre-op evaluation, timeout performed, IV checked, risks and benefits discussed and monitors and equipment checked  Additional Notes  Several attempts with siginificant transient right paresthesia, tuohy redirected until no paresthesia, no heme, and no csf noted, epid cath threaded with ease- neg test dose, relief obtained  Epidural Block Prep:  Pt Position:sitting  Sterile Tech:cap, gloves, mask and sterile barrier  Prep:DuraPrep  Monitoring:blood pressure monitoring  Epidural Block Procedure:  Approach:midline  Guidance:palpation technique  Location:L3-L4  Needle Type:Tuohy  Needle Gauge:17 G  Cath Depth at skin:14 cm  Paresthesia: none  Aspiration:negative  Test Dose:negative  Post Assessment:  Dressing:occlusive dressing applied and secured with tape  Pt Tolerance:patient tolerated the procedure well with no apparent complications  Complications:no

## 2017-04-21 NOTE — PLAN OF CARE
Problem: Labor (Cervical Ripen, Induct, Augment) (Adult,Obstetrics,Pediatric)  Goal: Signs and Symptoms of Listed Potential Problems Will be Absent or Manageable (Labor)  Outcome: Outcome(s) achieved Date Met:  04/21/17 04/21/17 1502   Labor (Cervical Ripen, Induct, Augment)   Problems Assessed (Labor) all   Problems Present (Labor) none

## 2017-04-21 NOTE — H&P
[facility]  Obstetric History and Physical    Chief Complaint   Patient presents with   • induced       Subjective     Patient is a 31 y.o. female  currently at 38w4d, who presents for induction of labor for oligohydramnios.    Her prenatal care is complicated by gestational diabetes and CHTN.  Her previous obstetric/gynecological history is noted for is non-contributory.    The following portions of the patients history were reviewed and updated as appropriate: current medications, allergies, past medical history, past surgical history, past family history, past social history and problem list .       Prenatal Information:  Prenatal Results         1st Trimester Ref. Range Date Time   CBC with auto diff       Rubella IgG       Hepatitis B SAg  Non-Reactive  Non-Reactive 16 1651   RPR  Non-Reactive  Non-Reactive 16 1651   ABO  O   16 1651   Rh  Positive   16 1651   Anibody Screen  Negative   17 0953   HIV       Varicella IgG       Urinalysis with microscopy       Urine Culture       GC/Chlamydia/TV       ThinPrep/Pap       2nd and 3rd Trimester Ref. Range Date Time   Hemoglobin / Hematocrit  35.9 % 34.5 - 44.0 % 17 0953   Hemoglobin  11.7 g/dL 11.5 - 15.5 g/dL 17 0953   Group B Strep Culture       Glucose Challenge Test 1 Hr  214 mg/dL (H) 65 - 199 mg/dL 17 0837   Glucose Fasting  116 mg/dL (H) 65 - 99 mg/dL 17 0837   Glucose 1 Hr  214 mg/dL (H) 65 - 199 mg/dL 17 0837   Glucose 2 Hr  168 mg/dL (H) 65 - 139 mg/dL 17 1100   Glucose 3 Hr  145 mg/dL (H) 65 - 109 mg/dL 17 1200   Pre-eclampsia Panel       Risk Screening Ref. Range Date Time   Fetal Fibronectin       Amnisure       Hepatitis C Antibody       Hemoglobin electrophoresis       Cystic Fibrosis       Hemoglobin A1C  5.4 % % 17 1315   MSAFP - 4       NIPT       AFP       Parvovirus IgG       Parvovirus IgM       POCT - glucose  111 mg/dL 75 - 125 mg/dL 17 0837    [facility]       24 Hour urine - Total protein       24 Hour urine - Creatinine clearance       Urinalysis with microscopy       Urine Culture       Drug Screening Ref. Range Date Time   Amphetamine Screen  Negative  Negative 16 1651   Barbiturate Screen  Negative  Negative 16 1651   Benzodiazepine Screen  Negative  Negative 16 1651   Methadone Screen  Negative  Negative 16 1651   Phencyclidine Screen  Negative  Negative 16 1651   Opiates Screen  Negative  Negative 16 1651   THC Screen  Negative  Negative 16 1651   Cocaine Screen  Negative  Negative 16 1651   Propoxyphene Screen  Negative  Negative 16 1651   Buprenorphine Screen  Negative  Negative 16 1651   Methamphetamine Screen  Negative  Negative 16 1651   Oxycodone Screen  Negative  Negative 16 1651   Tryicyclic Antidepressants Screen  Negative  Negative 16 1651          Legend: ^: Historical            View all results for this pregnancy             Past OB History:     Obstetric History       T1      TAB0   SAB0   E0   M0   L1       # Outcome Date GA Lbr Inocente/2nd Weight Sex Delivery Anes PTL Lv   2 Current            1 Term 13   9 lb 5 oz (4.224 kg) M CS-LVertical   Y          Past Medical History: Past Medical History:   Diagnosis Date   • Acid reflux    • Allergic    • Anxiety    • Arthritis    • Bronchitis    • Depression    • History of ear infections    • Hypotension    • Hypotension    • Migraines    • Sinusitis    • Urinary tract infection       Past Surgical History Past Surgical History:   Procedure Laterality Date   • MOUTH SURGERY     • WISDOM TOOTH EXTRACTION        Family History: Family History   Problem Relation Age of Onset   • Stroke Mother    • Hypotension Mother    • Cancer Father    • Diabetes Father    • Hypertension Father    • Diabetes Brother    • Bipolar disorder Brother    • Cancer Paternal Grandmother    • Heart disease Paternal  Grandmother    • Ovarian cancer Paternal Grandmother 70   • Diabetes Paternal Grandfather    • Cancer Paternal Grandfather       Social History:  reports that she has never smoked. She has never used smokeless tobacco.   reports that she does not drink alcohol.   reports that she does not use illicit drugs.        Review of Systems   Constitutional: Negative.    HENT: Negative.    Eyes: Negative.    Respiratory: Negative.    Cardiovascular: Negative.    Gastrointestinal: Negative.    Endocrine: Negative.    Genitourinary: Negative.    Musculoskeletal: Negative.    Skin: Negative.    Allergic/Immunologic: Negative.    Neurological: Negative.    Hematological: Negative.    Psychiatric/Behavioral: Negative.          Objective     Vital Signs Range for the last 24 hours  Temperature: Temp:  [97.8 °F (36.6 °C)] 97.8 °F (36.6 °C)   Temp Source: Temp src: Oral   BP: BP: (122)/(64) 122/64   Pulse: Heart Rate:  [82] 82   Respirations: Resp:  [18] 18   SPO2:     O2 Amount (l/min):     O2 Devices     Weight: Weight:  [328 lb (149 kg)] 328 lb (149 kg)     Physical Examination: General appearance - alert, well appearing, and in no distress  Neck - supple, no significant adenopathy  Chest - clear to auscultation, no wheezes, rales or rhonchi, symmetric air entry  Heart - normal rate, regular rhythm, normal S1, S2, no murmurs, rubs, clicks or gallops  Abdomen - soft, nontender, nondistended, no masses or organomegaly  Pelvic - normal external genitalia, vulva, vagina, cervix, uterus and adnexa  Musculoskeletal - no joint tenderness, deformity or swelling  Extremities - peripheral pulses normal, no pedal edema, no clubbing or cyanosis    Presentation: vertex   Cervix: Exam by:  CNKO   Dilation:  2   Effacement:     Station:       Fetal Heart Rate Assessment   Method:  external    Beats/min:  120   Baseline: Fetal HR Baseline: normal range (110-160 bpm)   Varibility:  moderate   Accels:     Decels:  absent   Tracing Category:        Uterine Assessment   Method:  external   Frequency (min): Rare, irregular   Ctx Count in 10 min:     Duration:     Intensity:     Intensity by IUPC:     Resting Tone:     Resting Tone by IUPC:     [place] Units:         Assessment/Plan     Active Problems:    Patient currently pregnant      Assessment & Plan    Assessment:  1.  Intrauterine pregnancy at 38w4d weeks gestation with reactive fetal status.    2.  Induction of labor for oligohydramnios  3.  Obstetrical history significant for GDM and CHTN.  4.  GBS status: No results found for: GBSANTIGEN    Plan:  1. Zelaya bulb cervical dilator followed by pitocin in the AM.   2. Plan of care has been reviewed with patient and she verbalizes understanding  3.  Risks, benefits of treatment plan have been discussed.  4.  All questions have been answered.  5.  Epidural as desires      Treva Clemens CNM  4/20/2017  8:47 PM

## 2017-04-21 NOTE — PROGRESS NOTES
[facility]  Obstetric Progress Note    Subjective     Patient:    Resting without complaints. Not uncomfortable with contractions at this time. Difficulty tracing FHTs or ctxs, due to MBH.     Objective     Vital Signs Range for the last 24 hours  Temp:  [97.6 °F (36.4 °C)-98.1 °F (36.7 °C)] 98.1 °F (36.7 °C)   Temp src: Oral   BP: (102-132)/(56-74) 126/73   Heart Rate:  [73-91] 76   Resp:  [16-18] 16               Weight:  [328 lb (149 kg)] 328 lb (149 kg)       Intake/Output this shift:         Physical Exam:                Presentation: vtx   Cervix: Exam by: Method: sterile exam per CNM   Dilation: Dilation: 5   Effacement: Cervical Effacement: 80%   Station: Station: -2         Fetal Heart Rate Assessment   Method: Fetal HR Assessment Method: external   Beats/min: Fetal HR (Beats/Min): 125 (off monitor to restroom)   Baseline: Fetal HR Baseline: normal range (110-160 bpm)   Varibility: Fetal HR Variability: moderate (amplitude range 6 to 25 bpm)   Accels: Fetal HR Accelerations: greater than/equal to 15 bpm, lasting at least 15 seconds   Decels: Fetal HR Decelerations: absent   Tracing Category:       Uterine Assessment   Method: Method: palpation   Frequency (min): Contraction Frequency (min): no ctx noted (pt off monitor to restroom)   Ctx Count in 10 min: Contraction Frequency in 10min: 1   Duration: Contraction Duration (sec): 100   Intensity: Contraction Intensity: no contractions   Intensity by IUPC:     Resting Tone: Uterine Resting Tone: soft by palpation   Resting Tone by IUPC:     [place] Units:         Assessment/Plan     Active Problems:    Patient currently pregnant        Assessment:  1.  Intrauterine pregnancy at 38w5d weeks gestation with reactive fetal status.    2.  induction of labor  for low DAVID and GDM (A2)  with unfavorable cervix  3.  Obstetrical history significant for is remarkable for GDM and oligohydramnios.  4.  GBS status: No results found for: GBSANTIGEN   5. IUPC and FSE  placed, AROM (fluid clear, FHTs reassuring)    Plan:  1. Continue observation  2.  Repeat SVE every 2-4 hours or prn  3.   Plan of care has been reviewed with patient   4.  Risks, benefits of treatment plan have been discussed.  5.  All questions have been answered.  6.  Epidural as desired  7. Anticipate       Everett Sanderson CNM  2017  9:38 AM

## 2017-04-21 NOTE — PLAN OF CARE
Problem: Patient Care Overview (Adult)  Goal: Discharge Needs Assessment  Outcome: Ongoing (interventions implemented as appropriate)    04/21/17 1502   Discharge Needs Assessment   Concerns To Be Addressed no discharge needs identified   Readmission Within The Last 30 Days no previous admission in last 30 days   Equipment Needed After Discharge none   Discharge Disposition home or self-care   Current Health   Anticipated Changes Related to Illness none   Self-Care   Equipment Currently Used at Home none   Living Environment   Transportation Available car

## 2017-04-21 NOTE — PROGRESS NOTES
[facility]  Obstetric Progress Note    Subjective     Patient:    Resting without complaints. Comfortable with epidural.     Objective     Vital Signs Range for the last 24 hours  Temp:  [97.6 °F (36.4 °C)-98.1 °F (36.7 °C)] 97.7 °F (36.5 °C)   Temp src: Oral   BP: (102-148)/(56-87) 123/70   Heart Rate:  [] 80   Resp:  [16-18] 16               Weight:  [328 lb (149 kg)] 328 lb (149 kg)       Intake/Output this shift:         Physical Exam:                Presentation: vtx   Cervix: Exam by: Method: sterile exam per CNM   Dilation: Dilation: 9   Effacement: Cervical Effacement: 90%   Station: Station: 0         Fetal Heart Rate Assessment   Method: Fetal HR Assessment Method: fetal spiral electrode   Beats/min: Fetal HR (Beats/Min): 125   Baseline: Fetal HR Baseline: normal range (110-160 bpm)   Varibility: Fetal HR Variability: moderate (amplitude range 6 to 25 bpm)   Accels: Fetal HR Accelerations: greater than/equal to 15 bpm, lasting at least 15 seconds   Decels: Fetal HR Decelerations: absent   Tracing Category:       Uterine Assessment   Method: Method: palpation   Frequency (min): Contraction Frequency (min): 2-3   Ctx Count in 10 min: Contraction Frequency in 10min: 1   Duration: Contraction Duration (sec): 40-70   Intensity: Contraction Intensity: moderate by palpation   Intensity by IUPC:     Resting Tone: Uterine Resting Tone: soft by palpation   Resting Tone by IUPC:     [place] Units:         Assessment/Plan     Active Problems:    Patient currently pregnant        A/P:  Good cervical change  FHTs reassuring  Continue current management  Anticipate ELISE Sanderson CNM  2017  12:45 PM

## 2017-04-22 LAB
BASOPHILS # BLD AUTO: 0.01 10*3/MM3 (ref 0–0.2)
BASOPHILS NFR BLD AUTO: 0.1 % (ref 0–1)
DEPRECATED RDW RBC AUTO: 48.6 FL (ref 37–54)
EOSINOPHIL # BLD AUTO: 0.12 10*3/MM3 (ref 0.1–0.3)
EOSINOPHIL NFR BLD AUTO: 1.6 % (ref 0–3)
ERYTHROCYTE [DISTWIDTH] IN BLOOD BY AUTOMATED COUNT: 14.3 % (ref 11.3–14.5)
HCT VFR BLD AUTO: 32.9 % (ref 34.5–44)
HGB BLD-MCNC: 10.2 G/DL (ref 11.5–15.5)
IMM GRANULOCYTES # BLD: 0.02 10*3/MM3 (ref 0–0.03)
IMM GRANULOCYTES NFR BLD: 0.3 % (ref 0–0.6)
LYMPHOCYTES # BLD AUTO: 1.82 10*3/MM3 (ref 0.6–4.8)
LYMPHOCYTES NFR BLD AUTO: 24.3 % (ref 24–44)
MCH RBC QN AUTO: 28.8 PG (ref 27–31)
MCHC RBC AUTO-ENTMCNC: 31 G/DL (ref 32–36)
MCV RBC AUTO: 92.9 FL (ref 80–99)
MONOCYTES # BLD AUTO: 0.62 10*3/MM3 (ref 0–1)
MONOCYTES NFR BLD AUTO: 8.3 % (ref 0–12)
NEUTROPHILS # BLD AUTO: 4.89 10*3/MM3 (ref 1.5–8.3)
NEUTROPHILS NFR BLD AUTO: 65.4 % (ref 41–71)
PLATELET # BLD AUTO: 218 10*3/MM3 (ref 150–450)
PMV BLD AUTO: 10.7 FL (ref 6–12)
RBC # BLD AUTO: 3.54 10*6/MM3 (ref 3.89–5.14)
WBC NRBC COR # BLD: 7.48 10*3/MM3 (ref 3.5–10.8)

## 2017-04-22 PROCEDURE — 85025 COMPLETE CBC W/AUTO DIFF WBC: CPT | Performed by: ADVANCED PRACTICE MIDWIFE

## 2017-04-22 RX ORDER — HYDROCODONE BITARTRATE AND ACETAMINOPHEN 5; 325 MG/1; MG/1
1 TABLET ORAL EVERY 6 HOURS PRN
Status: DISCONTINUED | OUTPATIENT
Start: 2017-04-22 | End: 2017-04-23 | Stop reason: HOSPADM

## 2017-04-22 RX ADMIN — IBUPROFEN 600 MG: 600 TABLET, FILM COATED ORAL at 16:29

## 2017-04-22 RX ADMIN — IBUPROFEN 600 MG: 600 TABLET, FILM COATED ORAL at 05:16

## 2017-04-22 RX ADMIN — DOCUSATE SODIUM 100 MG: 100 CAPSULE, LIQUID FILLED ORAL at 16:29

## 2017-04-22 RX ADMIN — IBUPROFEN 600 MG: 600 TABLET, FILM COATED ORAL at 23:22

## 2017-04-22 RX ADMIN — HYDROCODONE BITARTRATE AND ACETAMINOPHEN 1 TABLET: 5; 325 TABLET ORAL at 23:22

## 2017-04-22 RX ADMIN — DOCUSATE SODIUM 100 MG: 100 CAPSULE, LIQUID FILLED ORAL at 09:57

## 2017-04-22 RX ADMIN — HYDROCODONE BITARTRATE AND ACETAMINOPHEN 1 TABLET: 5; 325 TABLET ORAL at 16:56

## 2017-04-22 NOTE — ANESTHESIA POSTPROCEDURE EVALUATION
Patient: Maren Ray    Procedure Summary     Date Anesthesia Start Anesthesia Stop Room / Location    04/21/17 1034 1449        Procedure Diagnosis Scheduled Providers Provider    LABOR ANALGESIA No diagnosis on file.  Radha Key DO          Anesthesia Type: epidural  Last vitals  BP      Temp      Pulse     Resp      SpO2        Post Anesthesia Care and Evaluation    Patient location during evaluation: bedside  Patient participation: complete - patient participated  Level of consciousness: awake and alert  Pain score: 2  Pain management: adequate  Airway patency: patent  Anesthetic complications: No anesthetic complications    Cardiovascular status: acceptable  Respiratory status: acceptable  Hydration status: acceptable  Post Neuraxial Block status: Motor and sensory function returned to baseline and No signs or symptoms of PDPH

## 2017-04-22 NOTE — PROGRESS NOTES
4/22/2017  PPD #1    Subjective   Maren feels well.  Patient describes her lochia less than menses.  Pain is well controlled       Objective   Temp: Temp:  [97.5 °F (36.4 °C)-98.5 °F (36.9 °C)] 97.9 °F (36.6 °C) Temp src: Oral   BP: BP: ()/(51-87) 97/51        Pulse: Heart Rate:  [] 80  RR: Resp:  [14-16] 14    General:  No acute distress   Abdomen: Fundus firm and beneath umbilicus   Pelvis: deferred     Lab Results   Component Value Date    WBC 7.48 04/22/2017    HGB 10.2 (L) 04/22/2017    HCT 32.9 (L) 04/22/2017    MCV 92.9 04/22/2017     04/22/2017    RUBELLAIGGIN Immune 09/19/2016    HEPBSAG Non-Reactive 09/19/2016       Assessment  1. PPD# 1 after vaginal delivery    Plan  1. Routine postpartum care.      This note has been electronically signed.    Minoo Palomo DO  April 22, 2017

## 2017-04-22 NOTE — PLAN OF CARE
Problem: Patient Care Overview (Adult)  Goal: Plan of Care Review  Outcome: Ongoing (interventions implemented as appropriate)    17 0524   Coping/Psychosocial Response Interventions   Plan Of Care Reviewed With patient;spouse   Patient Care Overview   Progress improving   Outcome Evaluation   Outcome Summary/Follow up Plan VSS. Pt up ad martina. Voiding. Lochia WNL. Breastfeeding well.       Goal: Adult Individualization and Mutuality  Outcome: Ongoing (interventions implemented as appropriate)  Goal: Discharge Needs Assessment  Outcome: Ongoing (interventions implemented as appropriate)    Problem: Postpartum, Vaginal Delivery (Adult)  Goal: Signs and Symptoms of Listed Potential Problems Will be Absent or Manageable (Postpartum, Vaginal Delivery)  Outcome: Ongoing (interventions implemented as appropriate)    Problem: Breastfeeding (Adult,NICU,[place],Obstetrics,Pediatric)  Goal: Signs and Symptoms of Listed Potential Problems Will be Absent or Manageable (Breastfeeding)  Outcome: Ongoing (interventions implemented as appropriate)

## 2017-04-23 ENCOUNTER — HOSPITAL ENCOUNTER (INPATIENT)
Dept: LABOR AND DELIVERY | Facility: HOSPITAL | Age: 32
LOS: 3 days | Discharge: HOME | End: 2017-04-23
Attending: OBSTETRICS & GYNECOLOGY | Admitting: OBSTETRICS & GYNECOLOGY

## 2017-04-23 VITALS
SYSTOLIC BLOOD PRESSURE: 127 MMHG | HEART RATE: 80 BPM | DIASTOLIC BLOOD PRESSURE: 68 MMHG | WEIGHT: 293 LBS | BODY MASS INDEX: 45.99 KG/M2 | RESPIRATION RATE: 18 BRPM | HEIGHT: 67 IN | TEMPERATURE: 97.7 F

## 2017-04-23 PROBLEM — Z34.90 PATIENT CURRENTLY PREGNANT: Status: RESOLVED | Noted: 2017-04-20 | Resolved: 2017-04-23

## 2017-04-23 RX ORDER — HYDROCODONE BITARTRATE AND ACETAMINOPHEN 5; 325 MG/1; MG/1
1 TABLET ORAL EVERY 6 HOURS PRN
Qty: 10 TABLET | Refills: 0 | Status: SHIPPED | OUTPATIENT
Start: 2017-04-23 | End: 2017-05-02

## 2017-04-23 RX ORDER — IBUPROFEN 600 MG/1
600 TABLET ORAL EVERY 6 HOURS PRN
Qty: 60 TABLET | Refills: 1 | Status: SHIPPED | OUTPATIENT
Start: 2017-04-23 | End: 2019-10-23

## 2017-04-23 RX ADMIN — IBUPROFEN 600 MG: 600 TABLET, FILM COATED ORAL at 06:08

## 2017-04-23 RX ADMIN — HYDROCODONE BITARTRATE AND ACETAMINOPHEN 1 TABLET: 5; 325 TABLET ORAL at 10:44

## 2017-04-23 RX ADMIN — DOCUSATE SODIUM 100 MG: 100 CAPSULE, LIQUID FILLED ORAL at 07:46

## 2017-04-23 NOTE — PROGRESS NOTES
[facility]  Vaginal Delivery Progress Note    Subjective     Doing well, pain controlled, lochia less than menses      Objective     Vital Signs Range for the last 24 hours  Temperature: Temp:  [97.7 °F (36.5 °C)-98.8 °F (37.1 °C)] 97.7 °F (36.5 °C)   Temp Source:     BP: BP: (127-139)/(68-84) 127/68   Pulse: Heart Rate:  [80-82] 80   Respirations: Resp:  [14-18] 18   SPO2:     O2 Amount (l/min):     O2 Devices           Physical Exam:  General:  no acute distresss.  Abdomen: Soft, non-tender, fundus firm  Extremities: normal, atraumatic, no cyanosis, and trace edema.       Lab results reviewed:  Yes    Lab Results   Component Value Date    WBC 7.48 04/22/2017    HGB 10.2 (L) 04/22/2017    HCT 32.9 (L) 04/22/2017    MCV 92.9 04/22/2017     04/22/2017         Assessment/Plan     Active Problems:    Patient currently pregnant      Maren Ray is Day 2  post-partum       Plan:  Continue current care.      Treva Clemens CNM  4/23/2017  11:08 AM

## 2017-04-23 NOTE — PLAN OF CARE
Problem: Patient Care Overview (Adult)  Goal: Plan of Care Review  Outcome: Ongoing (interventions implemented as appropriate)    04/23/17 2425   Patient Care Overview   Progress improving

## 2017-04-23 NOTE — PLAN OF CARE
Problem: Patient Care Overview (Adult)  Goal: Plan of Care Review  Outcome: Ongoing (interventions implemented as appropriate)    04/22/17 1720   Coping/Psychosocial Response Interventions   Plan Of Care Reviewed With patient;spouse   Patient Care Overview   Progress improving   Outcome Evaluation   Outcome Summary/Follow up Plan Patient states infant is breastfeeding well when awake and alert but has been sleepy. Has history of low supply. Did have breast enlargement with this pregnancy more than previous one. Encouraged her to call tomorrow morning for latch check. Told her she could pump after offering breast for 10-15 minutes now too if infant still sleepy at breast to help build milk supply. To continue to offer breast on demand or every 3 hours.

## 2017-04-23 NOTE — DISCHARGE SUMMARY
[place]   Discharge Summary      Patient: Maren Ray      MR#:[id number]  Admission  Diagnosis: Term pregnancy  Discharge Diagnosis: Same    Date of Admission: 2017  Date of Discharge:  2017    Procedures:  Vaginal, Spontaneous Delivery     2017    2:46 PM      Service:  Obstetrics    Hospital Course:  Patient underwent vaginal delivery and remained in the hospital for 2 days.  During that time she remained afebrile and hemodynamically stable.  On the day of discharge, she was eating, ambulating and voiding without difficulty.      Lab Results   Component Value Date    WBC 7.48 2017    HGB 10.2 (L) 2017    HCT 32.9 (L) 2017    MCV 92.9 2017     2017       Results from last 7 days  Lab Units 17   ABO TYPING  O   RH TYPING  Positive   ANTIBODY SCREEN  Negative       Discharge Medications   Maren Ray   Home Medication Instructions LOUISE:[id number]    Printed on:17 1116   Medication Information                      ACCU-CHEK FASTCLIX LANCETS misc  Test blood sugars QID             Blood Glucose Monitoring Suppl (ACCU-CHEK EDWARD PLUS) W/DEVICE kit  Use to test blood sugars             HYDROcodone-acetaminophen (NORCO) 5-325 MG per tablet  Take 1 tablet by mouth Every 6 (Six) Hours As Needed for Moderate Pain (4-6) for up to 9 days.             ibuprofen (ADVIL,MOTRIN) 600 MG tablet  Take 1 tablet by mouth Every 6 (Six) Hours As Needed for Mild Pain (1-3).             Prenatal Vit-Fe Fumarate-FA (PRENATAL 27-1) 27-1 MG tablet tablet  Take 1 tablet by mouth Daily.                 Discharge Disposition:  To Home    Discharge Condition:  Stable    Discharge Diet: regular    Activity at Discharge: Pelvic rest    Follow-up Appointments  Future Appointments  Date Time Provider Department Center   2017 8:15 AM Dulce Rodriguez MD [facility] None         Treva Clemens CNM  17  11:16 AM

## 2017-06-08 ENCOUNTER — OFFICE VISIT (OUTPATIENT)
Dept: ENDOCRINOLOGY | Facility: CLINIC | Age: 32
End: 2017-06-08

## 2017-06-08 VITALS
DIASTOLIC BLOOD PRESSURE: 68 MMHG | SYSTOLIC BLOOD PRESSURE: 112 MMHG | WEIGHT: 293 LBS | HEIGHT: 67 IN | BODY MASS INDEX: 45.99 KG/M2

## 2017-06-08 LAB
GLUCOSE BLDC GLUCOMTR-MCNC: 126 MG/DL (ref 70–130)
HBA1C MFR BLD: 5.3 %

## 2017-06-08 PROCEDURE — 99213 OFFICE O/P EST LOW 20 MIN: CPT | Performed by: INTERNAL MEDICINE

## 2017-06-08 PROCEDURE — 83036 HEMOGLOBIN GLYCOSYLATED A1C: CPT | Performed by: INTERNAL MEDICINE

## 2017-06-08 PROCEDURE — 82947 ASSAY GLUCOSE BLOOD QUANT: CPT | Performed by: INTERNAL MEDICINE

## 2017-06-08 RX ORDER — CLOSTRIDIUM TETANI TOXOID ANTIGEN (FORMALDEHYDE INACTIVATED), CORYNEBACTERIUM DIPHTHERIAE TOXOID ANTIGEN (FORMALDEHYDE INACTIVATED), BORDETELLA PERTUSSIS TOXOID ANTIGEN (GLUTARALDEHYDE INACTIVATED), BORDETELLA PERTUSSIS FILAMENTOUS HEMAGGLUTININ ANTIGEN (FORMALDEHYDE INACTIVATED), BORDETELLA PERTUSSIS PERTACTIN ANTIGEN, AND BORDETELLA PERTUSSIS FIMBRIAE 2/3 ANTIGEN 5; 2; 2.5; 5; 3; 5 [LF]/.5ML; [LF]/.5ML; UG/.5ML; UG/.5ML; UG/.5ML; UG/.5ML
INJECTION, SUSPENSION INTRAMUSCULAR
Refills: 0 | COMMUNITY
Start: 2017-04-03 | End: 2019-10-23

## 2017-06-08 RX ORDER — NORETHINDRONE ACETATE, ETHINYL ESTRADIOL AND FERROUS FUMARATE 1MG-20(24)
KIT ORAL
COMMUNITY
Start: 2017-06-03 | End: 2019-10-23

## 2017-06-08 NOTE — PROGRESS NOTES
Maren Ray 31 y.o.  CC:Follow-up and Gestational Diabetes (after delivery on 4-, birth weight was 8 lbs, 1 oz, OB:  Ani Mart MD)    [place]: Follow-up and Gestational Diabetes (after delivery on 4-, birth weight was 8 lbs, 1 oz, OB:  Ani Mart MD)    Blood sugar and 90 day average sugar reviewed  Results for orders placed or performed during the hospital encounter of 04/20/17   CBC (No Diff)   Result Value Ref Range    WBC 8.63 3.50 - 10.80 10*3/mm3    RBC 3.96 3.89 - 5.14 10*6/mm3    Hemoglobin 11.5 11.5 - 15.5 g/dL    Hematocrit 36.6 34.5 - 44.0 %    MCV 92.4 80.0 - 99.0 fL    MCH 29.0 27.0 - 31.0 pg    MCHC 31.4 (L) 32.0 - 36.0 g/dL    RDW 14.2 11.3 - 14.5 %    RDW-SD 47.8 37.0 - 54.0 fl    MPV 10.5 6.0 - 12.0 fL    Platelets 255 150 - 450 10*3/mm3   CBC Auto Differential   Result Value Ref Range    WBC 7.48 3.50 - 10.80 10*3/mm3    RBC 3.54 (L) 3.89 - 5.14 10*6/mm3    Hemoglobin 10.2 (L) 11.5 - 15.5 g/dL    Hematocrit 32.9 (L) 34.5 - 44.0 %    MCV 92.9 80.0 - 99.0 fL    MCH 28.8 27.0 - 31.0 pg    MCHC 31.0 (L) 32.0 - 36.0 g/dL    RDW 14.3 11.3 - 14.5 %    RDW-SD 48.6 37.0 - 54.0 fl    MPV 10.7 6.0 - 12.0 fL    Platelets 218 150 - 450 10*3/mm3    Neutrophil % 65.4 41.0 - 71.0 %    Lymphocyte % 24.3 24.0 - 44.0 %    Monocyte % 8.3 0.0 - 12.0 %    Eosinophil % 1.6 0.0 - 3.0 %    Basophil % 0.1 0.0 - 1.0 %    Immature Grans % 0.3 0.0 - 0.6 %    Neutrophils, Absolute 4.89 1.50 - 8.30 10*3/mm3    Lymphocytes, Absolute 1.82 0.60 - 4.80 10*3/mm3    Monocytes, Absolute 0.62 0.00 - 1.00 10*3/mm3    Eosinophils, Absolute 0.12 0.10 - 0.30 10*3/mm3    Basophils, Absolute 0.01 0.00 - 0.20 10*3/mm3    Immature Grans, Absolute 0.02 0.00 - 0.03 10*3/mm3   POC Glucose Fingerstick   Result Value Ref Range    Glucose 80 70 - 130 mg/dL   POC Glucose Fingerstick   Result Value Ref Range    Glucose 85 70 - 130 mg/dL   POC Glucose Fingerstick   Result Value Ref Range    Glucose 61 (L) 70 - 130 mg/dL   POC  Glucose Fingerstick   Result Value Ref Range    Glucose 76 70 - 130 mg/dL   POC Glucose Fingerstick   Result Value Ref Range    Glucose 67 (L) 70 - 130 mg/dL   POC Glucose Fingerstick   Result Value Ref Range    Glucose 84 70 - 130 mg/dL   POC Glucose Fingerstick   Result Value Ref Range    Glucose 80 70 - 130 mg/dL   Type & Screen   Result Value Ref Range    ABO Type O     RH type Positive     Antibody Screen Negative      Blood sugar 126 and hgn a1c 5.3%  Allergies   Allergen Reactions   • Sulfa Antibiotics Angioedema       Current Outpatient Prescriptions:   •  ACCU-CHEK FASTCLIX LANCETS misc, Test blood sugars QID, Disp: 100 each, Rfl: 5  •  ADACEL 5-2-15.5 LF-MCG/0.5 injection, TO BE ADMINISTERED BY PHARMACIST FOR IMMUNIZATION, Disp: , Rfl: 0  •  Blood Glucose Monitoring Suppl (ACCU-CHEK EDWARD PLUS) W/DEVICE kit, Use to test blood sugars, Disp: 1 kit, Rfl: 0  •  ibuprofen (ADVIL,MOTRIN) 600 MG tablet, Take 1 tablet by mouth Every 6 (Six) Hours As Needed for Mild Pain (1-3)., Disp: 60 tablet, Rfl: 1  •  MIBELAS 24 FE 1-20 MG-MCG(24) chewable tablet, , Disp: , Rfl:   •  Prenatal Vit-Fe Fumarate-FA (PRENATAL 27-1) 27-1 MG tablet tablet, Take 1 tablet by mouth Daily., Disp: , Rfl:   Patient Active Problem List    Diagnosis   • Vaginal delivery [O80]   • Dyshidrotic eczema [L30.1]   • De Quervain's disease (radial styloid tenosynovitis) [M65.4]     Overview Note:     Description: Continue thumb spica splint. Will send prescription for diclofenac. Patient defers referral to orthopedic hand. If pain persists she can call the clinic and we will refer to ortho hand.     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   HENT: Negative for congestion, dental problem, drooling, ear discharge, ear pain, facial swelling, hearing loss, mouth sores, nosebleeds, postnasal drip, rhinorrhea, sinus pressure, sneezing, sore throat, tinnitus, trouble swallowing and voice change.    Eyes: Negative for photophobia, pain, discharge, redness, itching and visual disturbance.   Respiratory: Negative for apnea, cough, choking, chest tightness, shortness of breath, wheezing and stridor.    Cardiovascular: Negative for chest pain, palpitations and leg swelling.   Gastrointestinal: Negative for abdominal distention, abdominal pain, anal bleeding, blood in stool, constipation, diarrhea, nausea, rectal pain and vomiting.   Endocrine: Negative for cold intolerance, heat intolerance, polydipsia, polyphagia and polyuria.   Genitourinary: Negative for decreased urine volume, difficulty urinating, dysuria, enuresis, flank pain, frequency, genital sores, hematuria and urgency.   Musculoskeletal: Negative for arthralgias, back pain, gait problem, joint swelling, myalgias, neck pain and neck stiffness.   Skin: Negative for color change, pallor, rash and wound.   Allergic/Immunologic: Negative for environmental allergies, food allergies and immunocompromised state.   Neurological: Negative for dizziness, tremors, seizures, syncope, facial asymmetry, speech difficulty, weakness, light-headedness, numbness and headaches.   Hematological: Negative for adenopathy. Does not bruise/bleed easily.   Psychiatric/Behavioral: Negative for agitation, behavioral problems, confusion, decreased concentration, dysphoric mood, hallucinations, self-injury, sleep disturbance and suicidal ideas. The patient is not nervous/anxious and is not hyperactive.      Social  "History     Social History   • Marital status:      Spouse name: N/A   • Number of children: N/A   • Years of education: N/A     Occupational History   • Not on file.     Social History Main Topics   • Smoking status: Never Smoker   • Smokeless tobacco: Never Used   • Alcohol use No   • Drug use: No   • Sexual activity: Defer     Other Topics Concern   • Not on file     Social History Narrative     Family History   Problem Relation Age of Onset   • Stroke Mother    • Hypotension Mother    • Cancer Father    • Diabetes Father    • Hypertension Father    • Diabetes Brother    • Bipolar disorder Brother    • Cancer Paternal Grandmother    • Heart disease Paternal Grandmother    • Ovarian cancer Paternal Grandmother 70   • Diabetes Paternal Grandfather    • Cancer Paternal Grandfather      /68  Ht 67\" (170.2 cm)  Wt (!) 309 lb (140 kg)  LMP  (LMP Unknown)  Breastfeeding? Yes  BMI 48.4 kg/m2  Physical Exam   Constitutional: She is oriented to person, place, and time. She appears well-developed and well-nourished.   HENT:   Head: Normocephalic and atraumatic.   Nose: Nose normal.   Mouth/Throat: Oropharynx is clear and moist.   Eyes: Conjunctivae, EOM and lids are normal. Pupils are equal, round, and reactive to light.   Neck: Trachea normal and normal range of motion. Neck supple. Carotid bruit is not present. No tracheal deviation present. No thyroid mass and no thyromegaly present.   Cardiovascular: Normal rate, regular rhythm, normal heart sounds and intact distal pulses.  Exam reveals no gallop and no friction rub.    No murmur heard.  Pulmonary/Chest: Effort normal and breath sounds normal. No respiratory distress. She has no wheezes.   Musculoskeletal: Normal range of motion. She exhibits no edema or deformity.   Lymphadenopathy:     She has no cervical adenopathy.   Neurological: She is alert and oriented to person, place, and time. She has normal reflexes. She displays normal reflexes. No " cranial nerve deficit.   Skin: Skin is warm and dry. No rash noted. No cyanosis or erythema. Nails show no clubbing.   Psychiatric: She has a normal mood and affect. Her speech is normal and behavior is normal. Judgment and thought content normal. Cognition and memory are normal.   Nursing note and vitals reviewed.    Results for orders placed or performed during the hospital encounter of 04/20/17   CBC (No Diff)   Result Value Ref Range    WBC 8.63 3.50 - 10.80 10*3/mm3    RBC 3.96 3.89 - 5.14 10*6/mm3    Hemoglobin 11.5 11.5 - 15.5 g/dL    Hematocrit 36.6 34.5 - 44.0 %    MCV 92.4 80.0 - 99.0 fL    MCH 29.0 27.0 - 31.0 pg    MCHC 31.4 (L) 32.0 - 36.0 g/dL    RDW 14.2 11.3 - 14.5 %    RDW-SD 47.8 37.0 - 54.0 fl    MPV 10.5 6.0 - 12.0 fL    Platelets 255 150 - 450 10*3/mm3   CBC Auto Differential   Result Value Ref Range    WBC 7.48 3.50 - 10.80 10*3/mm3    RBC 3.54 (L) 3.89 - 5.14 10*6/mm3    Hemoglobin 10.2 (L) 11.5 - 15.5 g/dL    Hematocrit 32.9 (L) 34.5 - 44.0 %    MCV 92.9 80.0 - 99.0 fL    MCH 28.8 27.0 - 31.0 pg    MCHC 31.0 (L) 32.0 - 36.0 g/dL    RDW 14.3 11.3 - 14.5 %    RDW-SD 48.6 37.0 - 54.0 fl    MPV 10.7 6.0 - 12.0 fL    Platelets 218 150 - 450 10*3/mm3    Neutrophil % 65.4 41.0 - 71.0 %    Lymphocyte % 24.3 24.0 - 44.0 %    Monocyte % 8.3 0.0 - 12.0 %    Eosinophil % 1.6 0.0 - 3.0 %    Basophil % 0.1 0.0 - 1.0 %    Immature Grans % 0.3 0.0 - 0.6 %    Neutrophils, Absolute 4.89 1.50 - 8.30 10*3/mm3    Lymphocytes, Absolute 1.82 0.60 - 4.80 10*3/mm3    Monocytes, Absolute 0.62 0.00 - 1.00 10*3/mm3    Eosinophils, Absolute 0.12 0.10 - 0.30 10*3/mm3    Basophils, Absolute 0.01 0.00 - 0.20 10*3/mm3    Immature Grans, Absolute 0.02 0.00 - 0.03 10*3/mm3   POC Glucose Fingerstick   Result Value Ref Range    Glucose 80 70 - 130 mg/dL   POC Glucose Fingerstick   Result Value Ref Range    Glucose 85 70 - 130 mg/dL   POC Glucose Fingerstick   Result Value Ref Range    Glucose 61 (L) 70 - 130 mg/dL   POC Glucose  Fingerstick   Result Value Ref Range    Glucose 76 70 - 130 mg/dL   POC Glucose Fingerstick   Result Value Ref Range    Glucose 67 (L) 70 - 130 mg/dL   POC Glucose Fingerstick   Result Value Ref Range    Glucose 84 70 - 130 mg/dL   POC Glucose Fingerstick   Result Value Ref Range    Glucose 80 70 - 130 mg/dL   Type & Screen   Result Value Ref Range    ABO Type O     RH type Positive     Antibody Screen Negative      Maren was seen today for follow-up and gestational diabetes.    Diagnoses and all orders for this visit:    Gestational diabetes mellitus, delivered  -     POC Glycosylated Hemoglobin (Hb A1C)  -     POC Glucose Fingerstick    Commended efforts with dm during pregnancy  Long term goals of health and fitness reviewed  Recommended return to prepregnancy weight with eye on ideal body mass  Discussed whole food diet, avoid processed carbs madelin (along with sugared drinks  Signs and symptoms of diabetes including polydipsia, polyuria, cystitis and vaginitis discussed  Blood sugar cutoffs fasting >126 and pp anytime >200 a indicators of diabetes were reviewed  Need for yearly f/u with PCP discussed  Problem List Items Addressed This Visit        Endocrine    Gestational diabetes mellitus, delivered - Primary     Blood sugar and 90 day average sugar reviewed  Results for orders placed or performed in visit on 06/08/17   POC Glycosylated Hemoglobin (Hb A1C)   Result Value Ref Range    Hemoglobin A1C 5.3 %   POC Glucose Fingerstick   Result Value Ref Range    Glucose 126 70 - 130 mg/dL     Discussed overall health and fitness  Recommended yearly f/u with PCP for hgn a1c  Long term risk of development of diabetes discussed  Emphasis on weight loss/ weight maintenance, healthy diet and overall fitness discussed.           Relevant Orders    POC Glycosylated Hemoglobin (Hb A1C) (Completed)    POC Glucose Fingerstick (Completed)          Return if symptoms worsen or fail to improve, for Recheck.    Sharita Mcguire,  MA  Signed Dulce Rodriguez MD      Answers for HPI/ROS submitted by the patient on 6/1/2017   Diabetes problem  Diabetes type: gestational  MedicAlert ID: No  Disease duration: 4 months  blurred vision: No  foot paresthesias: No  foot ulcerations: No  visual change: No  weight loss: No  Symptom course: resolved  hunger: No  mood changes: No  sleepiness: No  sweats: No  blackouts: No  hospitalization: No  nocturnal hypoglycemia: No  required assistance: No  required glucagon: No  CVA: No  heart disease: No  impotence: No  nephropathy: No  peripheral neuropathy: No  PVD: No  retinopathy: No  CAD risks: obesity, stress  Current treatments: none  Treatment compliance: none of the time  Home blood tests: 3-4 x per week  Home urines: <1 x per month  Monitoring compliance: no compliance  Blood glucose trend: decreasing steadily  breakfast time: 9-10 am  breakfast glucose level: 70-90  lunch time: 1-2 pm  lunch glucose level: 70-90  dinner time: 7-8 pm  dinner glucose level: 70-90  High score:   Overall: 70-90  Weight trend: decreasing steadily  Current diet: generally healthy  Meal planning: none  Exercise: three times a week  Dietitian visit: No  Eye exam current: No  Sees podiatrist: No

## 2017-06-08 NOTE — ASSESSMENT & PLAN NOTE
Blood sugar and 90 day average sugar reviewed  Results for orders placed or performed in visit on 06/08/17   POC Glycosylated Hemoglobin (Hb A1C)   Result Value Ref Range    Hemoglobin A1C 5.3 %   POC Glucose Fingerstick   Result Value Ref Range    Glucose 126 70 - 130 mg/dL     Discussed overall health and fitness  Recommended yearly f/u with PCP for hgn a1c  Long term risk of development of diabetes discussed  Emphasis on weight loss/ weight maintenance, healthy diet and overall fitness discussed.

## 2018-05-22 ENCOUNTER — OFFICE VISIT (OUTPATIENT)
Dept: FAMILY MEDICINE CLINIC | Facility: CLINIC | Age: 33
End: 2018-05-22

## 2018-05-22 VITALS
BODY MASS INDEX: 45.99 KG/M2 | HEIGHT: 67 IN | DIASTOLIC BLOOD PRESSURE: 76 MMHG | WEIGHT: 293 LBS | TEMPERATURE: 98 F | OXYGEN SATURATION: 98 % | SYSTOLIC BLOOD PRESSURE: 114 MMHG | HEART RATE: 83 BPM

## 2018-05-22 DIAGNOSIS — D17.1 LIPOMA OF FLANK: Primary | ICD-10-CM

## 2018-05-22 PROBLEM — N63.32 MASS OF AXILLARY TAIL OF LEFT BREAST: Status: ACTIVE | Noted: 2018-05-22

## 2018-05-22 PROCEDURE — 99213 OFFICE O/P EST LOW 20 MIN: CPT | Performed by: FAMILY MEDICINE

## 2018-05-22 NOTE — PROGRESS NOTES
"Subjective   Maren Ray is a 32 y.o. female.     Pt is here due to soft knot 3 fingers wide on left side below armpit. Noticed Thursday night. Tender to touch feels like a bruise.    Stopped breastfeeding 2 months ago.    History of Present Illness reports a lump at the lower border of her bra line at the rib margin.  It is tender lump is not getting larger.  No overlying skin changes.  She noticed this last week.  No other lumps or nodules or bruises.    The following portions of the patient's history were reviewed and updated as appropriate: allergies, current medications, past family history, past medical history, past social history, past surgical history and problem list.    Review of Systems   Constitutional: Negative.    HENT: Negative.    Eyes: Negative.    Respiratory: Negative.    Cardiovascular: Negative.    Gastrointestinal: Negative.    Endocrine: Negative.    Genitourinary: Negative.    Musculoskeletal: Negative.    Skin: Negative.    Allergic/Immunologic: Negative.    Neurological: Negative.    Hematological: Positive for adenopathy.   Psychiatric/Behavioral: Negative.    All other systems reviewed and are negative.      Objective     Vitals:    05/22/18 1146   BP: 114/76   Pulse: 83   Temp: 98 °F (36.7 °C)   SpO2: 98%   Weight: (!) 150 kg (331 lb)   Height: 170.2 cm (67\")       Physical Exam   Constitutional: She appears well-developed and well-nourished.   Cardiovascular: Normal rate and regular rhythm.    Pulmonary/Chest: Effort normal and breath sounds normal. Chest wall is not dull to percussion. She exhibits tenderness (At the lower margin of the left mid axillary line). She exhibits no mass, no bony tenderness, no laceration, no crepitus, no edema, no deformity, no swelling and no retraction.   Musculoskeletal: Normal range of motion. She exhibits no edema, tenderness or deformity.   Vitals reviewed.      Assessment/Plan     Problem List Items Addressed This Visit        " Musculoskeletal and Integument    Lipoma of flank - Primary    Relevant Orders    US Abdomen Complete

## 2018-05-29 ENCOUNTER — HOSPITAL ENCOUNTER (OUTPATIENT)
Dept: ULTRASOUND IMAGING | Facility: HOSPITAL | Age: 33
Discharge: HOME | End: 2018-05-29
Attending: FAMILY MEDICINE | Admitting: FAMILY MEDICINE

## 2018-05-29 DIAGNOSIS — D17.1 LIPOMA OF FLANK: ICD-10-CM

## 2018-05-29 PROCEDURE — 76705 ECHO EXAM OF ABDOMEN: CPT

## 2018-07-28 ENCOUNTER — OFFICE VISIT (OUTPATIENT)
Dept: FAMILY MEDICINE CLINIC | Facility: CLINIC | Age: 33
End: 2018-07-28

## 2018-07-28 VITALS
DIASTOLIC BLOOD PRESSURE: 74 MMHG | HEIGHT: 67 IN | HEART RATE: 78 BPM | SYSTOLIC BLOOD PRESSURE: 122 MMHG | OXYGEN SATURATION: 98 % | BODY MASS INDEX: 45.99 KG/M2 | WEIGHT: 293 LBS

## 2018-07-28 DIAGNOSIS — R22.32 NODULE OF LEFT PALM: ICD-10-CM

## 2018-07-28 DIAGNOSIS — R60.0 EDEMA OF BOTH UPPER EXTREMITIES: ICD-10-CM

## 2018-07-28 DIAGNOSIS — M79.601 BILATERAL ARM PAIN: Primary | ICD-10-CM

## 2018-07-28 DIAGNOSIS — R53.83 FATIGUE, UNSPECIFIED TYPE: ICD-10-CM

## 2018-07-28 DIAGNOSIS — Z86.2 HISTORY OF AUTOIMMUNE DISORDER: ICD-10-CM

## 2018-07-28 DIAGNOSIS — Z00.00 HEALTHCARE MAINTENANCE: ICD-10-CM

## 2018-07-28 DIAGNOSIS — M79.602 BILATERAL ARM PAIN: Primary | ICD-10-CM

## 2018-07-28 PROCEDURE — 36415 COLL VENOUS BLD VENIPUNCTURE: CPT | Performed by: NURSE PRACTITIONER

## 2018-07-28 PROCEDURE — 99214 OFFICE O/P EST MOD 30 MIN: CPT | Performed by: NURSE PRACTITIONER

## 2018-07-28 RX ORDER — CIPROFLOXACIN HYDROCHLORIDE 3.5 MG/ML
SOLUTION/ DROPS TOPICAL
Refills: 0 | COMMUNITY
Start: 2018-07-04 | End: 2018-09-29

## 2018-07-28 NOTE — PATIENT INSTRUCTIONS
Musculoskeletal Pain  Musculoskeletal pain is muscle and bone aches and pains. This pain can occur in any part of the body.  Follow these instructions at home:  · Only take medicines for pain, discomfort, or fever as told by your health care provider.  · You may continue all activities unless the activities cause more pain. When the pain lessens, slowly resume normal activities. Gradually increase the intensity and duration of the activities or exercise.  · During periods of severe pain, bed rest may be helpful. Lie or sit in any position that is comfortable, but get out of bed and walk around at least every several hours.  · If directed, put ice on the injured area.  ? Put ice in a plastic bag.  ? Place a towel between your skin and the bag.  ? Leave the ice on for 20 minutes, 2-3 times a day.  Contact a health care provider if:  · Your pain is getting worse.  · Your pain is not relieved with medicines.  · You lose function in the area of the pain if the pain is in your arms, legs, or neck.  This information is not intended to replace advice given to you by your health care provider. Make sure you discuss any questions you have with your health care provider.  Document Released: 12/18/2006 Document Revised: 05/30/2017 Document Reviewed: 08/22/2014  [employer] Interactive Patient Education © 2017 [employer]  Antinuclear Antibody Test  Why am I having this test?  This is a test used to help diagnose systemic lupus erythematosus (SLE) and other autoimmune diseases. An autoimmune disease is a disease in which the body's own defense system (immune system) attacks the body. This test checks for antinuclear antibodies (SONU) in the blood. The presence of SONU is associated with several autoimmune diseases. It is most commonly seen with SLE.  What kind of sample is taken?  A blood sample is required for this test. It is usually collected by inserting a needle into a vein.  How do I prepare for this test?  There is no  preparation required for this test.  How are the results reported?  Your test results will be reported as either positive or negative. It is your responsibility to obtain your test results. Ask the lab or department performing the test when and how you will get your results.  What do the results mean?  A positive test may mean you have:  · SLE.  · An autoimmune disease.  · Liver dysfunction.  · Leukemia.  · Infectious mononucleosis.    Talk with your health care provider to discuss your results, treatment options, and if necessary, the need for more tests. Talk with your health care provider if you have any questions about your results.  Talk with your health care provider to discuss your results, treatment options, and if necessary, the need for more tests. Talk with your health care provider if you have any questions about your results.  This information is not intended to replace advice given to you by your health care provider. Make sure you discuss any questions you have with your health care provider.  Document Released: 01/09/2006 Document Revised: 08/21/2017 Document Reviewed: 05/19/2015  [employer] Interactive Patient Education © 2018 [employer]  Rheumatoid Factor Test  Why am I having this test?  Your body makes protective proteins called antibodies to help fight off infection. In some conditions, the body mistakes normal tissues for foreign germs. In those cases, it attacks the tissues with these antibodies (autoantibodies). These conditions are called autoimmune diseases.  The rheumatoid factor test checks your blood for a collection of antibodies that work together to attack tissues that are wrongly identified as foreign. As a group, these antibodies (IgM, IgG, and IgA) are known as the rheumatoid factor (RF).  Your health care provider may ask you to have this test if he or she suspects that you have an autoimmune disease, such as rheumatoid arthritis (RA), systemic lupus erythematous (SLE), or  Sjogren syndrome. A majority of people who have rheumatoid arthritis have a positive rheumatoid factor. However, it is also possible for the RF test to be negative even when the disease is present. Likewise, a small number of people may have a positive RF test when an autoimmune disease is not actually present.  What kind of sample is taken?  A blood sample is required for this test. It is usually collected by inserting a needle into a vein or by sticking a finger with a small needle.  How do I prepare for this test?  There is no preparation or fasting required for this test.  What do the results mean?  Your test result will be reported as either positive or negative. Talk with your health care provider to discuss whether your results mean something significant for you.  · A positive test result means that a sufficient amount of RF autoantibodies are present in your blood to be detectable by the test.  ? RF dilution of greater than a 1-to-80 ratio is considered a positive test. This means that it is likely that you have RA. Sometimes, RF levels also increase with age. This does not necessarily mean that RA or another autoimmune disease is present.  ? RF dilution of less than a 1-to-80 ratio may indicate the presence of autoimmune diseases other than RA, such as SLE and Sjogren syndrome.  · A negative test result of less than 60 units/mL means that it is unlikely that you have an autoimmune disease.    It is your responsibility to obtain your test results. Ask the lab or department performing the test when and how you will get your results. Talk with your health care provider if you have any questions about your results.  Talk with your health care provider to discuss your results, treatment options, and if necessary, the need for more tests. Talk with your health care provider if you have any questions about your results.  This information is not intended to replace advice given to you by your health care provider.  Make sure you discuss any questions you have with your health care provider.  Document Released: 01/20/2006 Document Revised: 08/23/2017 Document Reviewed: 05/14/2015  [employer] Interactive Patient Education © 2018 [employer]

## 2018-07-28 NOTE — PROGRESS NOTES
Subjective   Maren Ray is a 32 y.o. female.     Upper Extremity Issue   This is a new problem. The current episode started more than 1 month ago. The problem occurs daily. The problem has been waxing and waning. Associated symptoms include arthralgias, fatigue, myalgias and weakness (both arms). Pertinent negatives include no abdominal pain, anorexia, change in bowel habit, chest pain, chills, congestion, coughing, diaphoresis, fever, headaches, joint swelling, nausea, neck pain, numbness, rash, sore throat, swollen glands, urinary symptoms, vertigo, visual change or vomiting. Nothing aggravates the symptoms. She has tried rest for the symptoms. The treatment provided no relief.       The following portions of the patient's history were reviewed and updated as appropriate: allergies, current medications, past family history, past medical history, past social history, past surgical history and problem list.     Current Outpatient Prescriptions on File Prior to Visit   Medication Sig   • ACCU-CHEK FASTCLIX LANCETS misc Test blood sugars QID   • ADACEL 5-2-15.5 LF-MCG/0.5 injection TO BE ADMINISTERED BY PHARMACIST FOR IMMUNIZATION   • Blood Glucose Monitoring Suppl (ACCU-CHEK EDWARD PLUS) W/DEVICE kit Use to test blood sugars   • ibuprofen (ADVIL,MOTRIN) 600 MG tablet Take 1 tablet by mouth Every 6 (Six) Hours As Needed for Mild Pain (1-3).   • MIBELAS 24 FE 1-20 MG-MCG(24) chewable tablet    • Prenatal Vit-Fe Fumarate-FA (PRENATAL 27-1) 27-1 MG tablet tablet Take 1 tablet by mouth Daily.   • sertraline (ZOLOFT) 50 MG tablet Take 50 mg by mouth Daily.     No current facility-administered medications on file prior to visit.      Allergies   Allergen Reactions   • Sulfa Antibiotics Angioedema     Review of Systems   Constitutional: Positive for fatigue. Negative for activity change, appetite change, chills, diaphoresis, fever and unexpected weight change.   HENT: Negative.  Negative for congestion and sore  "throat.    Respiratory: Negative.  Negative for cough.    Cardiovascular: Negative for chest pain, palpitations and leg swelling.   Gastrointestinal: Negative for abdominal pain, anorexia, change in bowel habit, diarrhea, nausea and vomiting.   Genitourinary: Negative.    Musculoskeletal: Positive for arthralgias and myalgias. Negative for joint swelling and neck pain.        Also c/o \"knots\" on both hands that come and go. States bilateral arms and hands swelling intermittently. Patient states she was diagnosed as a child as having an \"autoimmune disease\" and went to [facility] for treatment (saw a rheumatologist)-has had no diagnostic testing as an adult   Skin: Negative for rash.   Neurological: Positive for weakness (both arms). Negative for dizziness, vertigo, tremors, seizures, syncope, facial asymmetry, speech difficulty, numbness and headaches.   Hematological: Negative.    Psychiatric/Behavioral: Negative.        Objective   Physical Exam   Constitutional: She is oriented to person, place, and time. Vital signs are normal. She appears well-developed and well-nourished. She is cooperative. No distress.   HENT:   Head: Normocephalic and atraumatic.   Right Ear: External ear normal.   Left Ear: External ear normal.   Nose: Nose normal.   Mouth/Throat: Oropharynx is clear and moist.   Eyes: Pupils are equal, round, and reactive to light. No scleral icterus.   Neck: Normal range of motion. Neck supple. No thyromegaly present.   Cardiovascular: Normal rate, regular rhythm and normal heart sounds.    Pulmonary/Chest: Effort normal and breath sounds normal.   Musculoskeletal:        Right shoulder: She exhibits decreased range of motion (slight). She exhibits no tenderness, no crepitus, no deformity, no pain, no spasm and normal strength.        Left shoulder: Normal.        Right elbow: Normal.       Left elbow: Normal.        Right wrist: She exhibits swelling (mild edema).        Cervical back: Normal.        " Right hand: She exhibits swelling (mild). She exhibits normal range of motion, no tenderness and normal capillary refill.        Left hand: She exhibits swelling (mild).   Ganglion cyst left wrist. Several small nodules palm of left hand.   Lymphadenopathy:     She has no cervical adenopathy.   Neurological: She is alert and oriented to person, place, and time.   Skin: Skin is warm and dry. Capillary refill takes 2 to 3 seconds. No rash noted. She is not diaphoretic.   Psychiatric: She has a normal mood and affect. Her behavior is normal. Judgment and thought content normal.   Vitals reviewed.    Vitals:    07/28/18 1328   BP: 122/74   Pulse: 78   SpO2: 98%   Body mass index is 51.84 kg/m².     Assessment/Plan   Maren was seen today for numbness.    Diagnoses and all orders for this visit:    Bilateral arm pain  -     Uric Acid; Future  -     Sedimentation Rate; Future  -     C-reactive Protein; Future    Gestational diabetes mellitus, delivered  -     Hemoglobin A1c; Future    History of autoimmune disorder  -     Uric Acid; Future  -     Sedimentation Rate; Future  -     C-reactive Protein; Future  -     Rheumatoid Factor; Future  -     Nuclear Antigen Antibody, IFA; Future    Edema of both upper extremities  -     Comprehensive Metabolic Panel; Future  -     TSH; Future  -     T4, Free; Future  -     CBC Auto Differential; Future  -     Uric Acid; Future  -     Sedimentation Rate; Future  -     C-reactive Protein; Future  -     Rheumatoid Factor; Future  -     Nuclear Antigen Antibody, IFA; Future    Nodule of left palm  -     Uric Acid; Future  -     Sedimentation Rate; Future  -     C-reactive Protein; Future  -     Rheumatoid Factor; Future  -     Nuclear Antigen Antibody, IFA; Future    Healthcare maintenance  -     Comprehensive Metabolic Panel; Future  -     TSH; Future  -     T4, Free; Future  -     CBC Auto Differential; Future  -     Hemoglobin A1c; Future  -     Lipid Panel; Future  -     Vitamin  B12; Future  -     Vitamin D 25 Hydroxy; Future  -     Uric Acid; Future  -     Sedimentation Rate; Future  -     C-reactive Protein; Future  -     POC Urinalysis Dipstick, Automated; Future    Fatigue, unspecified type  -     Vitamin B12; Future  -     Vitamin D 25 Hydroxy; Future       Discussed the nature of the medical condition(s) risks, complications, implications, management, safe and proper use of medications. Encouraged medication compliance, and keeping scheduled follow up appointments with me and any other providers.   Laboratory testing ordered today. Further recommendations after lab evaluation.

## 2018-07-29 ENCOUNTER — LAB (OUTPATIENT)
Dept: FAMILY MEDICINE CLINIC | Facility: CLINIC | Age: 33
End: 2018-07-29

## 2018-07-29 DIAGNOSIS — Z00.00 HEALTHCARE MAINTENANCE: ICD-10-CM

## 2018-07-29 DIAGNOSIS — M79.602 BILATERAL ARM PAIN: ICD-10-CM

## 2018-07-29 DIAGNOSIS — R60.0 EDEMA OF BOTH UPPER EXTREMITIES: ICD-10-CM

## 2018-07-29 DIAGNOSIS — R53.83 FATIGUE, UNSPECIFIED TYPE: ICD-10-CM

## 2018-07-29 DIAGNOSIS — M79.601 BILATERAL ARM PAIN: ICD-10-CM

## 2018-07-29 DIAGNOSIS — Z86.2 HISTORY OF AUTOIMMUNE DISORDER: ICD-10-CM

## 2018-07-29 DIAGNOSIS — R22.32 NODULE OF LEFT PALM: ICD-10-CM

## 2018-07-30 ENCOUNTER — LAB (OUTPATIENT)
Dept: FAMILY MEDICINE CLINIC | Facility: CLINIC | Age: 33
End: 2018-07-30

## 2018-07-30 DIAGNOSIS — Z00.00 HEALTHCARE MAINTENANCE: ICD-10-CM

## 2018-07-30 LAB
25(OH)D3 SERPL-MCNC: 26.7 NG/ML
ALBUMIN SERPL-MCNC: 3.96 G/DL (ref 3.2–4.8)
ALBUMIN/GLOB SERPL: 1.4 G/DL (ref 1.5–2.5)
ALP SERPL-CCNC: 53 U/L (ref 25–100)
ALT SERPL W P-5'-P-CCNC: 17 U/L (ref 7–40)
ANION GAP SERPL CALCULATED.3IONS-SCNC: 6 MMOL/L (ref 3–11)
ARTICHOKE IGE QN: 167 MG/DL (ref 0–130)
AST SERPL-CCNC: 13 U/L (ref 0–33)
BASOPHILS # BLD AUTO: 0.04 10*3/MM3 (ref 0–0.2)
BASOPHILS NFR BLD AUTO: 0.5 % (ref 0–1)
BILIRUB BLD-MCNC: NEGATIVE MG/DL
BILIRUB SERPL-MCNC: 0.2 MG/DL (ref 0.3–1.2)
BUN BLD-MCNC: 15 MG/DL (ref 9–23)
BUN/CREAT SERPL: 21.7 (ref 7–25)
CALCIUM SPEC-SCNC: 8.9 MG/DL (ref 8.7–10.4)
CHLORIDE SERPL-SCNC: 107 MMOL/L (ref 99–109)
CHOLEST SERPL-MCNC: 205 MG/DL (ref 0–200)
CLARITY, POC: CLEAR
CO2 SERPL-SCNC: 28 MMOL/L (ref 20–31)
COLOR UR: YELLOW
CREAT BLD-MCNC: 0.69 MG/DL (ref 0.6–1.3)
CRP SERPL-MCNC: 2.53 MG/DL (ref 0–1)
DEPRECATED RDW RBC AUTO: 49.6 FL (ref 37–54)
EOSINOPHIL # BLD AUTO: 1.63 10*3/MM3 (ref 0–0.3)
EOSINOPHIL NFR BLD AUTO: 19.5 % (ref 0–3)
ERYTHROCYTE [DISTWIDTH] IN BLOOD BY AUTOMATED COUNT: 14.5 % (ref 11.3–14.5)
ERYTHROCYTE [SEDIMENTATION RATE] IN BLOOD: 48 MM/HR (ref 0–20)
EXPIRATION DATE: ABNORMAL
GFR SERPL CREATININE-BSD FRML MDRD: 98 ML/MIN/1.73
GLOBULIN UR ELPH-MCNC: 2.7 GM/DL
GLUCOSE BLD-MCNC: 116 MG/DL (ref 70–100)
GLUCOSE UR STRIP-MCNC: NEGATIVE MG/DL
HBA1C MFR BLD: 5.6 % (ref 4.8–5.6)
HCT VFR BLD AUTO: 43.3 % (ref 34.5–44)
HDLC SERPL-MCNC: 35 MG/DL (ref 40–60)
HGB BLD-MCNC: 13.4 G/DL (ref 11.5–15.5)
IMM GRANULOCYTES # BLD: 0.04 10*3/MM3 (ref 0–0.03)
IMM GRANULOCYTES NFR BLD: 0.5 % (ref 0–0.6)
KETONES UR QL: NEGATIVE
LEUKOCYTE EST, POC: ABNORMAL
LYMPHOCYTES # BLD AUTO: 2.06 10*3/MM3 (ref 0.6–4.8)
LYMPHOCYTES NFR BLD AUTO: 24.6 % (ref 24–44)
Lab: ABNORMAL
MCH RBC QN AUTO: 29.1 PG (ref 27–31)
MCHC RBC AUTO-ENTMCNC: 30.9 G/DL (ref 32–36)
MCV RBC AUTO: 94.1 FL (ref 80–99)
MONOCYTES # BLD AUTO: 0.35 10*3/MM3 (ref 0–1)
MONOCYTES NFR BLD AUTO: 4.2 % (ref 0–12)
NEUTROPHILS # BLD AUTO: 4.26 10*3/MM3 (ref 1.5–8.3)
NEUTROPHILS NFR BLD AUTO: 50.7 % (ref 41–71)
NITRITE UR-MCNC: NEGATIVE MG/ML
PH UR: 5.5 [PH] (ref 5–8)
PLATELET # BLD AUTO: 281 10*3/MM3 (ref 150–450)
PMV BLD AUTO: 10.1 FL (ref 6–12)
POTASSIUM BLD-SCNC: 4.9 MMOL/L (ref 3.5–5.5)
PROT SERPL-MCNC: 6.7 G/DL (ref 5.7–8.2)
PROT UR STRIP-MCNC: NEGATIVE MG/DL
RBC # BLD AUTO: 4.6 10*6/MM3 (ref 3.89–5.14)
RBC # UR STRIP: ABNORMAL /UL
SODIUM BLD-SCNC: 141 MMOL/L (ref 132–146)
SP GR UR: 1.02 (ref 1–1.03)
T4 FREE SERPL-MCNC: 0.98 NG/DL (ref 0.89–1.76)
TRIGL SERPL-MCNC: 252 MG/DL (ref 0–150)
TSH SERPL DL<=0.05 MIU/L-ACNC: 3.1 MIU/ML (ref 0.35–5.35)
URATE SERPL-MCNC: 3.8 MG/DL (ref 3.1–7.8)
UROBILINOGEN UR QL: NORMAL
VIT B12 BLD-MCNC: 564 PG/ML (ref 211–911)
WBC NRBC COR # BLD: 8.38 10*3/MM3 (ref 3.5–10.8)

## 2018-07-30 PROCEDURE — 84443 ASSAY THYROID STIM HORMONE: CPT | Performed by: NURSE PRACTITIONER

## 2018-07-30 PROCEDURE — 86038 ANTINUCLEAR ANTIBODIES: CPT | Performed by: NURSE PRACTITIONER

## 2018-07-30 PROCEDURE — 80061 LIPID PANEL: CPT | Performed by: NURSE PRACTITIONER

## 2018-07-30 PROCEDURE — 85025 COMPLETE CBC W/AUTO DIFF WBC: CPT | Performed by: NURSE PRACTITIONER

## 2018-07-30 PROCEDURE — 83036 HEMOGLOBIN GLYCOSYLATED A1C: CPT | Performed by: NURSE PRACTITIONER

## 2018-07-30 PROCEDURE — 84439 ASSAY OF FREE THYROXINE: CPT | Performed by: NURSE PRACTITIONER

## 2018-07-30 PROCEDURE — 36415 COLL VENOUS BLD VENIPUNCTURE: CPT | Performed by: NURSE PRACTITIONER

## 2018-07-30 PROCEDURE — 82306 VITAMIN D 25 HYDROXY: CPT | Performed by: NURSE PRACTITIONER

## 2018-07-30 PROCEDURE — 86430 RHEUMATOID FACTOR TEST QUAL: CPT | Performed by: NURSE PRACTITIONER

## 2018-07-30 PROCEDURE — 86140 C-REACTIVE PROTEIN: CPT | Performed by: NURSE PRACTITIONER

## 2018-07-30 PROCEDURE — 82607 VITAMIN B-12: CPT | Performed by: NURSE PRACTITIONER

## 2018-07-30 PROCEDURE — 81003 URINALYSIS AUTO W/O SCOPE: CPT | Performed by: NURSE PRACTITIONER

## 2018-07-30 PROCEDURE — 84550 ASSAY OF BLOOD/URIC ACID: CPT | Performed by: NURSE PRACTITIONER

## 2018-07-30 PROCEDURE — 80053 COMPREHEN METABOLIC PANEL: CPT | Performed by: NURSE PRACTITIONER

## 2018-07-31 LAB
ANA SER QL IA: POSITIVE
ANA SPECKLED TITR SER: ABNORMAL {TITER}
Lab: ABNORMAL
RHEUMATOID FACT SERPL-ACNC: NEGATIVE [IU]/ML

## 2018-08-02 ENCOUNTER — TELEPHONE (OUTPATIENT)
Dept: FAMILY MEDICINE CLINIC | Facility: CLINIC | Age: 33
End: 2018-08-02

## 2018-08-05 DIAGNOSIS — R76.8 POSITIVE ANA (ANTINUCLEAR ANTIBODY): Primary | ICD-10-CM

## 2018-09-29 ENCOUNTER — OFFICE VISIT (OUTPATIENT)
Dept: FAMILY MEDICINE CLINIC | Facility: CLINIC | Age: 33
End: 2018-09-29

## 2018-09-29 VITALS
OXYGEN SATURATION: 97 % | BODY MASS INDEX: 45.99 KG/M2 | HEART RATE: 82 BPM | WEIGHT: 293 LBS | TEMPERATURE: 97.5 F | SYSTOLIC BLOOD PRESSURE: 126 MMHG | HEIGHT: 67 IN | RESPIRATION RATE: 26 BRPM | DIASTOLIC BLOOD PRESSURE: 86 MMHG

## 2018-09-29 DIAGNOSIS — J02.0 STREP PHARYNGITIS: ICD-10-CM

## 2018-09-29 DIAGNOSIS — J02.9 SORE THROAT: Primary | ICD-10-CM

## 2018-09-29 LAB
EXPIRATION DATE: ABNORMAL
INTERNAL CONTROL: ABNORMAL
Lab: ABNORMAL
S PYO AG THROAT QL: POSITIVE

## 2018-09-29 PROCEDURE — 99213 OFFICE O/P EST LOW 20 MIN: CPT | Performed by: NURSE PRACTITIONER

## 2018-09-29 PROCEDURE — 87880 STREP A ASSAY W/OPTIC: CPT | Performed by: NURSE PRACTITIONER

## 2018-09-29 RX ORDER — AMOXICILLIN 875 MG/1
875 TABLET, COATED ORAL 2 TIMES DAILY
Qty: 20 TABLET | Refills: 0 | Status: SHIPPED | OUTPATIENT
Start: 2018-09-29 | End: 2018-10-09

## 2018-09-29 NOTE — PROGRESS NOTES
"Subjective   Maren Ray is a 33 y.o. female.   Chief Complaint   Patient presents with   • Sore Throat    Walk in   History of Present Illness   Patient is here with complaint of sore throat, fever, poor appetite.   Has been taking ibuprofen - and aleve with some relief.       The following portions of the patient's history were reviewed and updated as appropriate: allergies, current medications, past family history, past medical history, past social history, past surgical history and problem list.    Review of Systems   Constitutional: Positive for activity change, appetite change, fatigue and fever. Negative for chills, diaphoresis and unexpected weight change.   HENT: Positive for congestion, sore throat and trouble swallowing. Negative for postnasal drip.    Eyes: Negative.    Respiratory: Negative.    Cardiovascular: Negative.    Gastrointestinal: Negative.    Musculoskeletal: Negative.    Skin: Negative.    Allergic/Immunologic: Negative.    Neurological: Positive for headaches. Negative for facial asymmetry.       Objective   Allergies   Allergen Reactions   • Sulfa Antibiotics Angioedema     Visit Vitals  /86   Pulse 82   Temp 97.5 °F (36.4 °C) (Temporal Artery )   Resp 26   Ht 170.2 cm (67\")   Wt (!) 152 kg (335 lb)   SpO2 97%   Breastfeeding? No   BMI 52.47 kg/m²       Physical Exam   Constitutional: She is oriented to person, place, and time. She appears well-developed and well-nourished. She appears ill.   HENT:   Head: Normocephalic.   Right Ear: Hearing, tympanic membrane, external ear and ear canal normal.   Left Ear: Hearing, tympanic membrane, external ear and ear canal normal.   Nose: Nose normal. No sinus tenderness. Right sinus exhibits no maxillary sinus tenderness and no frontal sinus tenderness. Left sinus exhibits no maxillary sinus tenderness and no frontal sinus tenderness.   Mouth/Throat: Uvula is midline. Posterior oropharyngeal erythema present. Tonsils are 3+ on the " right. Tonsils are 3+ on the left. Tonsillar exudate.   Eyes: Pupils are equal, round, and reactive to light.   Cardiovascular: Normal rate and regular rhythm.    Pulmonary/Chest: Effort normal and breath sounds normal.   Abdominal: Soft.   Lymphadenopathy:     She has no cervical adenopathy.   Neurological: She is alert and oriented to person, place, and time.   Skin: Skin is warm, dry and intact. Capillary refill takes less than 2 seconds.   Psychiatric: She has a normal mood and affect. Her behavior is normal.   Vitals reviewed.      Assessment/Plan   Maren was seen today for sore throat.    Diagnoses and all orders for this visit:    Sore throat  -     POCT rapid strep A    Strep pharyngitis  -     amoxicillin (AMOXIL) 875 MG tablet; Take 1 tablet by mouth 2 (Two) Times a Day for 10 days.      POCT Rapid Strep A : Positive   See patient instructions for strep throat.   Follow up as needed.                Iraida Cruz, DARRIUS

## 2018-09-29 NOTE — PATIENT INSTRUCTIONS
Strep Throat  Strep throat is a bacterial infection of the throat. Your health care provider may call the infection tonsillitis or pharyngitis, depending on whether there is swelling in the tonsils or at the back of the throat. Strep throat is most common during the cold months of the year in children who are 5-15 years of age, but it can happen during any season in people of any age. This infection is spread from person to person (contagious) through coughing, sneezing, or close contact.  What are the causes?  Strep throat is caused by the bacteria called Streptococcus pyogenes.  What increases the risk?  This condition is more likely to develop in:  · People who spend time in crowded places where the infection can spread easily.  · People who have close contact with someone who has strep throat.    What are the signs or symptoms?  Symptoms of this condition include:  · Fever or chills.  · Redness, swelling, or pain in the tonsils or throat.  · Pain or difficulty when swallowing.  · White or yellow spots on the tonsils or throat.  · Swollen, tender glands in the neck or under the jaw.  · Red rash all over the body (rare).    How is this diagnosed?  This condition is diagnosed by performing a rapid strep test or by taking a swab of your throat (throat culture test). Results from a rapid strep test are usually ready in a few minutes, but throat culture test results are available after one or two days.  How is this treated?  This condition is treated with antibiotic medicine.  Follow these instructions at home:  Medicines  · Take over-the-counter and prescription medicines only as told by your health care provider.  · Take your antibiotic as told by your health care provider. Do not stop taking the antibiotic even if you start to feel better.  · Have family members who also have a sore throat or fever tested for strep throat. They may need antibiotics if they have the strep infection.  Eating and drinking  · Do not  share food, drinking cups, or personal items that could cause the infection to spread to other people.  · If swallowing is difficult, try eating soft foods until your sore throat feels better.  · Drink enough fluid to keep your urine clear or pale yellow.  General instructions  · Gargle with a salt-water mixture 3-4 times per day or as needed. To make a salt-water mixture, completely dissolve ½-1 tsp of salt in 1 cup of warm water.  · Make sure that all household members wash their hands well.  · Get plenty of rest.  · Stay home from school or work until you have been taking antibiotics for 24 hours.  · Keep all follow-up visits as told by your health care provider. This is important.  Contact a health care provider if:  · The glands in your neck continue to get bigger.  · You develop a rash, cough, or earache.  · You cough up a thick liquid that is green, yellow-brown, or bloody.  · You have pain or discomfort that does not get better with medicine.  · Your problems seem to be getting worse rather than better.  · You have a fever.  Get help right away if:  · You have new symptoms, such as vomiting, severe headache, stiff or painful neck, chest pain, or shortness of breath.  · You have severe throat pain, drooling, or changes in your voice.  · You have swelling of the neck, or the skin on the neck becomes red and tender.  · You have signs of dehydration, such as fatigue, dry mouth, and decreased urination.  · You become increasingly sleepy, or you cannot wake up completely.  · Your joints become red or painful.  This information is not intended to replace advice given to you by your health care provider. Make sure you discuss any questions you have with your health care provider.  Document Released: 12/15/2001 Document Revised: 08/16/2017 Document Reviewed: 04/11/2016  [employer] Interactive Patient Education © 2018 [employer]

## 2018-12-19 DIAGNOSIS — R76.8 POSITIVE ANA (ANTINUCLEAR ANTIBODY): Primary | ICD-10-CM

## 2019-10-23 ENCOUNTER — OFFICE VISIT (OUTPATIENT)
Dept: FAMILY MEDICINE CLINIC | Facility: CLINIC | Age: 34
End: 2019-10-23

## 2019-10-23 VITALS
TEMPERATURE: 96.9 F | BODY MASS INDEX: 47.09 KG/M2 | HEIGHT: 66 IN | DIASTOLIC BLOOD PRESSURE: 86 MMHG | HEART RATE: 79 BPM | SYSTOLIC BLOOD PRESSURE: 128 MMHG | WEIGHT: 293 LBS | OXYGEN SATURATION: 99 %

## 2019-10-23 DIAGNOSIS — G56.03 BILATERAL CARPAL TUNNEL SYNDROME: Primary | ICD-10-CM

## 2019-10-23 DIAGNOSIS — Z23 NEEDS FLU SHOT: ICD-10-CM

## 2019-10-23 DIAGNOSIS — M79.7 FIBROMYALGIA: ICD-10-CM

## 2019-10-23 DIAGNOSIS — F32.A DEPRESSION, UNSPECIFIED DEPRESSION TYPE: ICD-10-CM

## 2019-10-23 PROCEDURE — 90674 CCIIV4 VAC NO PRSV 0.5 ML IM: CPT | Performed by: FAMILY MEDICINE

## 2019-10-23 PROCEDURE — 99214 OFFICE O/P EST MOD 30 MIN: CPT | Performed by: FAMILY MEDICINE

## 2019-10-23 PROCEDURE — 90471 IMMUNIZATION ADMIN: CPT | Performed by: FAMILY MEDICINE

## 2019-10-23 RX ORDER — TIZANIDINE HYDROCHLORIDE 2 MG/1
2 CAPSULE, GELATIN COATED ORAL AS NEEDED
Refills: 1 | COMMUNITY
Start: 2019-10-14 | End: 2020-11-11

## 2019-10-23 NOTE — PROGRESS NOTES
Subjective   Maren Ray is a 34 y.o. female.     Pt is here for referral to Neurology as recommended by rheumatologist for evaluation of numbness and drawing up in hands along with pain in neck and shoulders. Pt had nerve conduction study done 7-8 months ago that showed mild carpal tunnel on left. Rheumatologist is thinking worsen of numbness and pain may be related to fibro.  Also recommended physical therapy.    History of Present Illness she is followed by [facility] she is also continuing to have swelling and joint pain and had emg almost 1 year ago.  1 month after nerve conduction test the carpal tunnel sx worsened.  She has been using wrist braces at night and during the day.  She is seeing rheum next month.      She is having muscle tension in neck, she is taking tizanidine 2mg as needed.  She is alternating between joint and nerve and muscle pain.  With muscle spasms. 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      She ran out of zoloft 1 month ago and sx have worsened since dc zoloft.  She tried cymbalta in past and had night terrors.      The following portions of the patient's history were reviewed and updated as appropriate: allergies, current medications, past family history, past medical history, past social history, past surgical history and problem list.    Review of Systems   Constitutional: Positive for fatigue.   HENT: Negative.    Respiratory: Negative.    Cardiovascular: Negative.    Genitourinary: Negative.    Musculoskeletal: Positive for arthralgias, back pain, joint swelling and neck pain.   Skin: Negative for color change and rash.   Neurological: Positive for numbness. Negative for dizziness, facial asymmetry, light-headedness and headaches.   Psychiatric/Behavioral: Positive for sleep disturbance. Negative for agitation and behavioral problems.       Objective     Vitals:    10/23/19 0830   BP: 128/86   Pulse: 79   Temp: 96.9 °F (36.1 °C)   SpO2: 99%   Weight: (!)  "156 kg (343 lb)   Height: 167.6 cm (66\")       Physical Exam   Constitutional: She is oriented to person, place, and time. She appears well-developed and well-nourished.   HENT:   Head: Normocephalic and atraumatic.   Eyes: EOM are normal. Pupils are equal, round, and reactive to light. Right eye exhibits no discharge. Left eye exhibits no discharge.   Neck: Normal range of motion. Neck supple.   Cardiovascular: Normal rate, regular rhythm, normal heart sounds and intact distal pulses.   Pulmonary/Chest: Effort normal and breath sounds normal.   Abdominal: Soft. Bowel sounds are normal. She exhibits no mass. There is no tenderness.   Musculoskeletal: Normal range of motion. She exhibits tenderness. She exhibits no edema or deformity.        Right shoulder: She exhibits no swelling.   Tenderness at wrists.  +phallens +tinnels   Neurological: She is alert and oriented to person, place, and time. She has normal reflexes.   Skin: Skin is warm and dry. No cyanosis. Nails show no clubbing.   Psychiatric: She has a normal mood and affect. Her behavior is normal. Judgment and thought content normal.   Nursing note and vitals reviewed.      Assessment/Plan     Problem List Items Addressed This Visit        Nervous and Auditory    Bilateral carpal tunnel syndrome - Primary    Relevant Orders    Ambulatory Referral to Neurology    Ambulatory Referral to Physical Therapy Evaluate and treat    EMG & Nerve Conduction Test    Fibromyalgia    Relevant Orders    Ambulatory Referral to Neurology    Ambulatory Referral to Physical Therapy Evaluate and treat       Other    Depression    Relevant Medications    sertraline (ZOLOFT) 50 MG tablet      Other Visit Diagnoses     Needs flu shot        Relevant Orders    Flucelvax Quad=>4Years ([id number]) (Completed)          "

## 2019-10-31 ENCOUNTER — TREATMENT (OUTPATIENT)
Dept: PHYSICAL THERAPY | Facility: CLINIC | Age: 34
End: 2019-10-31

## 2019-10-31 DIAGNOSIS — G56.03 BILATERAL CARPAL TUNNEL SYNDROME: ICD-10-CM

## 2019-10-31 DIAGNOSIS — M79.7 FIBROMYALGIA: ICD-10-CM

## 2019-10-31 PROCEDURE — 97163 PT EVAL HIGH COMPLEX 45 MIN: CPT | Performed by: PHYSICAL THERAPIST

## 2019-10-31 PROCEDURE — G0283 ELEC STIM OTHER THAN WOUND: HCPCS | Performed by: PHYSICAL THERAPIST

## 2019-10-31 PROCEDURE — 97110 THERAPEUTIC EXERCISES: CPT | Performed by: PHYSICAL THERAPIST

## 2019-11-07 ENCOUNTER — TREATMENT (OUTPATIENT)
Dept: PHYSICAL THERAPY | Facility: CLINIC | Age: 34
End: 2019-11-07

## 2019-11-07 DIAGNOSIS — M79.7 FIBROMYALGIA: Primary | ICD-10-CM

## 2019-11-07 PROCEDURE — 97110 THERAPEUTIC EXERCISES: CPT | Performed by: PHYSICAL THERAPIST

## 2019-11-07 PROCEDURE — G0283 ELEC STIM OTHER THAN WOUND: HCPCS | Performed by: PHYSICAL THERAPIST

## 2019-11-07 NOTE — PROGRESS NOTES
Physical Therapy Daily Progress Note    TOTAL TIME: 70 MINUTES    Marendarius Ray reports: shoulder feels a lot better, putty helps me get my fists to close completely, but 30 min later I can't close them fully again      Objective   See Exercise, Manual, and Modality Logs for complete treatment.     THERAPEUTIC EXERCISES/ACTIVITIES ADDED TODAY: see flow sheets     Assessment/Plan  Patient performed well with PT and new therapeutic exercise; difficulty with hand strength and ROM exercises; patient states less pain in right shoulder    Progress per Plan of Care and Progress strengthening /stabilization /functional activity           Manual Therapy:         mins  43291;  Therapeutic Exercise:    55     mins  40771;     Neuromuscular Derrick:        mins  40450;    Therapeutic Activity:          mins  71078;     Gait Training:           mins  63336;     Ultrasound:          mins  53028;    Electrical Stimulation:    15     mins  91788 ( );  Dry Needling          mins self-pay    Timed Treatment:   70   mins   Total Treatment:     70   mins    Wojciech Leyva, PT  Physical Therapist

## 2019-11-14 ENCOUNTER — TREATMENT (OUTPATIENT)
Dept: PHYSICAL THERAPY | Facility: CLINIC | Age: 34
End: 2019-11-14

## 2019-11-14 PROCEDURE — G0283 ELEC STIM OTHER THAN WOUND: HCPCS | Performed by: PHYSICAL THERAPIST

## 2019-11-14 PROCEDURE — 97110 THERAPEUTIC EXERCISES: CPT | Performed by: PHYSICAL THERAPIST

## 2019-11-14 NOTE — PROGRESS NOTES
Physical Therapy Daily Progress Note    TOTAL TIME: 75 MINUTES    Maren Ray reports: patient states she was feeling a little better, but a couple of days ago she started getting sore all over, muscles throughout her body were sore; hands were sore; feel like I've run a marathon      Objective   See Exercise, Manual, and Modality Logs for complete treatment.     THERAPEUTIC EXERCISES/ACTIVITIES ADDED TODAY: LTR, advanced LTR, ball on wall walks, supine knee to chest, UBE, Nu step    UBE, Nu step for warm-up    Assessment/Plan  Patient felt better after therapy session; should add new exercises to HEP for flexibility, posture    Progress per Plan of Care and Progress strengthening /stabilization /functional activity           Manual Therapy:         mins  37171;  Therapeutic Exercise:    60     mins  52743;     Neuromuscular Derrick:        mins  23981;    Therapeutic Activity:          mins  39585;     Gait Training:           mins  73048;     Ultrasound:          mins  02639;    Electrical Stimulation:    15     mins  40074 ( );  Dry Needling          mins self-pay    Timed Treatment:   75   mins   Total Treatment:     75   mins    Wojciech Leyva, PT  Physical Therapist

## 2019-11-21 ENCOUNTER — TREATMENT (OUTPATIENT)
Dept: PHYSICAL THERAPY | Facility: CLINIC | Age: 34
End: 2019-11-21

## 2019-11-21 DIAGNOSIS — M79.7 FIBROMYALGIA: Primary | ICD-10-CM

## 2019-11-21 PROCEDURE — 97110 THERAPEUTIC EXERCISES: CPT | Performed by: PHYSICAL THERAPIST

## 2019-11-21 PROCEDURE — G0283 ELEC STIM OTHER THAN WOUND: HCPCS | Performed by: PHYSICAL THERAPIST

## 2019-11-21 NOTE — PROGRESS NOTES
Physical Therapy Daily Progress Note    TOTAL TIME: 55 MINUTES    Marendarius Ray reports: patient reports slightly more strength in hands; continued general ache all over; hands and  strength fatigue quickly       Objective   See Exercise, Manual, and Modality Logs for complete treatment.     THERAPEUTIC EXERCISES/ACTIVITIES ADDED TODAY: shoulder tband series 6 different ways    Assessment/Plan  Patient making some progress with PT ; continued s/s of fibromyalgia with achiness and soft tissue tenderness throughout ; continue with PT to progress functional strength and decrease general pain and tenderness     Progress per Plan of Care and Progress strengthening /stabilization /functional activity           Manual Therapy:         mins  38563;  Therapeutic Exercise:    40     mins  89347;     Neuromuscular Derrick:        mins  54437;    Therapeutic Activity:          mins  66591;     Gait Training:           mins  75393;     Ultrasound:          mins  02027;    Electrical Stimulation:    15     mins  75403 ( );  Dry Needling          mins self-pay    Timed Treatment:   55   mins   Total Treatment:     55   mins    Wojciech Leyva, PT  Physical Therapist

## 2019-11-26 ENCOUNTER — TREATMENT (OUTPATIENT)
Dept: PHYSICAL THERAPY | Facility: CLINIC | Age: 34
End: 2019-11-26

## 2019-11-26 DIAGNOSIS — M79.7 FIBROMYALGIA: Primary | ICD-10-CM

## 2019-11-26 PROCEDURE — 97110 THERAPEUTIC EXERCISES: CPT | Performed by: PHYSICAL THERAPIST

## 2019-11-26 PROCEDURE — G0283 ELEC STIM OTHER THAN WOUND: HCPCS | Performed by: PHYSICAL THERAPIST

## 2019-11-26 NOTE — PROGRESS NOTES
Physical Therapy Daily Progress Note    TOTAL TIME: 70 MINUTES    Maren Ray reports: improved  strength; therapy helps shoulder feel better for the day; have appts scheduled for EMG, rheumatology and neurology coming up in December; shoulder feels stronger from the exercises I've been doing; fingers go numb and tingly often and intermittently; shoulder is still sore and achy      Objective   See Exercise, Manual, and Modality Logs for complete treatment.        strength:  Right 14#, left 10 # ( improved from 2-3# bilaterally on initial eval)    THERAPEUTIC EXERCISES/ACTIVITIES ADDED TODAY: see flow sheets     Assessment/Plan  Patient is making minimal progress to date; will hopefully gain more insight on what is contributing to her symptoms with her future tests and MD appointments; continue with UE flexibility and strengthening in order to improve functional ability    Progress per Plan of Care           Manual Therapy:    55     mins  36556;  Therapeutic Exercise:         mins  22891;     Neuromuscular Derrick:        mins  25241;    Therapeutic Activity:          mins  49193;     Gait Training:           mins  52903;     Ultrasound:          mins  55764;    Electrical Stimulation:    15     mins  93426 ( );  Dry Needling          mins self-pay    Timed Treatment:   70   mins   Total Treatment:     70   mins    Wojciech Leyva, SUHAS  Physical Therapist

## 2019-12-03 ENCOUNTER — HOSPITAL ENCOUNTER (OUTPATIENT)
Dept: NEUROLOGY | Facility: HOSPITAL | Age: 34
Discharge: HOME | End: 2019-12-03
Admitting: FAMILY MEDICINE

## 2019-12-03 DIAGNOSIS — G56.03 BILATERAL CARPAL TUNNEL SYNDROME: ICD-10-CM

## 2019-12-03 PROCEDURE — 95886 MUSC TEST DONE W/N TEST COMP: CPT

## 2019-12-03 PROCEDURE — 95910 NRV CNDJ TEST 7-8 STUDIES: CPT

## 2019-12-04 ENCOUNTER — OFFICE VISIT (OUTPATIENT)
Dept: FAMILY MEDICINE CLINIC | Facility: CLINIC | Age: 34
End: 2019-12-04

## 2019-12-04 VITALS
BODY MASS INDEX: 47.09 KG/M2 | DIASTOLIC BLOOD PRESSURE: 86 MMHG | OXYGEN SATURATION: 98 % | HEIGHT: 66 IN | WEIGHT: 293 LBS | TEMPERATURE: 97.9 F | SYSTOLIC BLOOD PRESSURE: 122 MMHG | HEART RATE: 90 BPM

## 2019-12-04 DIAGNOSIS — M79.7 FIBROMYALGIA: Primary | ICD-10-CM

## 2019-12-04 DIAGNOSIS — F32.A DEPRESSION, UNSPECIFIED DEPRESSION TYPE: ICD-10-CM

## 2019-12-04 DIAGNOSIS — G56.03 BILATERAL CARPAL TUNNEL SYNDROME: ICD-10-CM

## 2019-12-04 PROCEDURE — 99213 OFFICE O/P EST LOW 20 MIN: CPT | Performed by: FAMILY MEDICINE

## 2019-12-04 NOTE — PROGRESS NOTES
"Subjective   Maren Ray is a 34 y.o. female.     Pt is here to fu on zoloft. Pt has noticed decrease fibro pain since starting medication. Had nerve conduction test yesterday.    History of Present Illness she is here for follow-up Zoloft.  She had a nerve conduction testing yesterday that showed median nerve neuropathy bilateral hands.  I have reviewed the results of the nerve conduction study with the patient.    She reports that she had a nerve conduction test about a year ago that was normal the nerve conduction study from yesterday shows severe median nerve neuropathy on the right with moderate neuropathy on the left.  She has already been using wrist splints at night and throughout the day.    The following portions of the patient's history were reviewed and updated as appropriate: allergies, current medications, past family history, past medical history, past social history, past surgical history and problem list.    Review of Systems   Genitourinary: Negative.    Musculoskeletal: Positive for arthralgias.   Neurological: Positive for weakness and numbness.   Hematological: Negative.    Psychiatric/Behavioral: Negative.        Objective     Vitals:    12/04/19 0924   BP: 122/86   Pulse: 90   Temp: 97.9 °F (36.6 °C)   SpO2: 98%   Weight: (!) 155 kg (341 lb)   Height: 167.6 cm (66\")       Physical Exam   Constitutional: She appears well-developed and well-nourished.   Cardiovascular: Normal rate and regular rhythm.   Pulmonary/Chest: Effort normal and breath sounds normal.   Psychiatric: She has a normal mood and affect. Her behavior is normal.   Nursing note and vitals reviewed.      Assessment/Plan     Problem List Items Addressed This Visit        Nervous and Auditory    Bilateral carpal tunnel syndrome    Relevant Orders    Ambulatory Referral to Orthopedic Surgery    Fibromyalgia - Primary       Other    Depression    Relevant Medications    sertraline (ZOLOFT) 50 MG tablet        Carpal tunnel " information discussed.   Refer ortho hand.   Continue zoloft 50 mg.

## 2019-12-05 ENCOUNTER — TREATMENT (OUTPATIENT)
Dept: PHYSICAL THERAPY | Facility: CLINIC | Age: 34
End: 2019-12-05

## 2019-12-05 DIAGNOSIS — M79.7 FIBROMYALGIA: Primary | ICD-10-CM

## 2019-12-05 PROCEDURE — 97110 THERAPEUTIC EXERCISES: CPT | Performed by: PHYSICAL THERAPIST

## 2019-12-05 PROCEDURE — G0283 ELEC STIM OTHER THAN WOUND: HCPCS | Performed by: PHYSICAL THERAPIST

## 2019-12-13 ENCOUNTER — TREATMENT (OUTPATIENT)
Dept: PHYSICAL THERAPY | Facility: CLINIC | Age: 34
End: 2019-12-13

## 2019-12-13 DIAGNOSIS — M79.7 FIBROMYALGIA: Primary | ICD-10-CM

## 2019-12-13 PROCEDURE — 97110 THERAPEUTIC EXERCISES: CPT | Performed by: PHYSICAL THERAPIST

## 2019-12-13 PROCEDURE — G0283 ELEC STIM OTHER THAN WOUND: HCPCS | Performed by: PHYSICAL THERAPIST

## 2019-12-16 NOTE — PROGRESS NOTES
Physical Therapy Daily Progress Note    TOTAL TIME: 65 MINUTES    Marendarius Ray reports: patient reports overall mild improvement; shoulder does not ache as much; still with intermittent muscle pain; hands continue to have symptoms of pain/tingling, hard to close fingers and have a full  ; also hard to extend fingers all the way out when they get stiff      Objective   See Exercise, Manual, and Modality Logs for complete treatment.     THERAPEUTIC EXERCISES/ACTIVITIES ADDED TODAY: see flow sheets; yellow digi web finger extension   Pre-mod estim to bilateral carpal tunnel with moist heat    Assessment/Plan  Patient is working hard to improve her UE and hand symptoms;  strength still very weak bilaterally, but improving slowly; recent EMG shows moderate-severe CTS; patient should benefit from continued PT to improve functional ROM and strength     Progress per Plan of Care           Manual Therapy:         mins  02391;  Therapeutic Exercise:    50     mins  74074;     Neuromuscular Derrick:        mins  96916;    Therapeutic Activity:          mins  32089;     Gait Training:           mins  98277;     Ultrasound:          mins  60984;    Electrical Stimulation:    15     mins  48334 ( );  Dry Needling          mins self-pay    Timed Treatment:   65   mins   Total Treatment:     65   mins    Wojciech Leyva, PT  Physical Therapist

## 2019-12-17 ENCOUNTER — OFFICE VISIT (OUTPATIENT)
Dept: ORTHOPEDIC SURGERY | Facility: CLINIC | Age: 34
End: 2019-12-17

## 2019-12-17 VITALS — HEIGHT: 66 IN | OXYGEN SATURATION: 98 % | WEIGHT: 293 LBS | HEART RATE: 89 BPM | BODY MASS INDEX: 47.09 KG/M2

## 2019-12-17 DIAGNOSIS — M79.89 SWELLING OF RIGHT HAND: ICD-10-CM

## 2019-12-17 DIAGNOSIS — M79.641 BILATERAL HAND PAIN: Primary | ICD-10-CM

## 2019-12-17 DIAGNOSIS — R20.2 NUMBNESS AND TINGLING IN LEFT HAND: ICD-10-CM

## 2019-12-17 DIAGNOSIS — R20.0 NUMBNESS AND TINGLING IN LEFT HAND: ICD-10-CM

## 2019-12-17 DIAGNOSIS — M25.641 STIFFNESS OF FINGER JOINT OF RIGHT HAND: ICD-10-CM

## 2019-12-17 DIAGNOSIS — M79.642 BILATERAL HAND PAIN: Primary | ICD-10-CM

## 2019-12-17 PROCEDURE — 20526 THER INJECTION CARP TUNNEL: CPT | Performed by: PHYSICIAN ASSISTANT

## 2019-12-17 PROCEDURE — 99204 OFFICE O/P NEW MOD 45 MIN: CPT | Performed by: PHYSICIAN ASSISTANT

## 2019-12-17 RX ADMIN — TRIAMCINOLONE ACETONIDE 40 MG: 40 INJECTION, SUSPENSION INTRA-ARTICULAR; INTRAMUSCULAR at 09:26

## 2019-12-17 RX ADMIN — LIDOCAINE HYDROCHLORIDE 1 ML: 10 INJECTION, SOLUTION EPIDURAL; INFILTRATION; INTRACAUDAL; PERINEURAL at 09:26

## 2019-12-17 NOTE — PROGRESS NOTES
[facility] [place] [facility] Note    Subjective     Chief Complaint   Patient presents with   • Right Hand - Pain   • Left Hand - Pain        HPI  Maren Ray is a 34 y.o. female.  Right-hand-dominant.  Patient presents for evaluation of bilateral wrist/hand pain with numbness noted in the left hand.  She reports is been ongoing for 2 years.  No history of injury or trauma.  Patient is currently being worked up for autoimmune issues.  She does see a rheumatologist at [facility] (Dr. Menchaca).  With regards to the right hand she notes swelling and drawing up of the fingers.  On the left hand she notes numbness in the tips of her fingers as well as shooting pain.  She does wear braces at night which do help.  Sometimes she does wear them during the day.  She is seeing physical therapy and they thought she might have some thoracic compression issues but those resolved with PT.  Patient states that with physical therapy she is noticed some increasing  strength as well as better ability for motion of the digits.  Currently taking ibuprofen and Tylenol for pain control.    Patient does report a pain scale of 4/10.  Severity the pain moderate.  Quality the pain aching, burning, throbbing, shooting.  Associated symptoms swelling on the right side as well as stiffness to the digits.    No reported fever, chills, night sweats or other constitutional symptoms    Past Medical History:   Diagnosis Date   • Acid reflux    • Allergic    • Anxiety    • Arthritis    • Bronchitis    • Depression    • History of ear infections    • Hypotension    • Hypotension    • Migraines    • Sinusitis    • Urinary tract infection    • Vaginal delivery 04/21/2017      Past Surgical History:   Procedure Laterality Date   • MOUTH SURGERY     • WISDOM TOOTH EXTRACTION        Family History   Problem Relation Age of Onset   • Stroke Mother    • Hypotension Mother    • Cancer Father    • Diabetes Father    • Hypertension Father    • Diabetes  "Brother    • Bipolar disorder Brother    • Cancer Paternal Grandmother    • Heart disease Paternal Grandmother    • Ovarian cancer Paternal Grandmother 70   • Diabetes Paternal Grandfather    • Cancer Paternal Grandfather      Social History     Socioeconomic History   • Marital status:      Spouse name: Not on file   • Number of children: Not on file   • Years of education: Not on file   • Highest education level: Not on file   Tobacco Use   • Smoking status: Never Smoker   • Smokeless tobacco: Never Used   Substance and Sexual Activity   • Alcohol use: No   • Drug use: No   • Sexual activity: Defer      Current Outpatient Medications on File Prior to Visit   Medication Sig Dispense Refill   • sertraline (ZOLOFT) 50 MG tablet Take 1 tablet by mouth Daily. 90 tablet 3   • TiZANidine (ZANAFLEX) 2 MG capsule Take 2 mg by mouth every night at bedtime.  1     No current facility-administered medications on file prior to visit.       Allergies   Allergen Reactions   • Sulfa Antibiotics Angioedema        The following portions of the patient's history were reviewed and updated as appropriate: allergies, current medications, past family history, past medical history, past social history, past surgical history and problem list.    Review of Systems   Constitutional: Positive for activity change and fatigue.   Eyes: Negative.    Respiratory: Negative.    Cardiovascular: Negative.    Gastrointestinal: Positive for nausea.   Endocrine: Positive for cold intolerance.   Genitourinary: Negative.    Musculoskeletal: Positive for arthralgias, back pain, joint swelling, myalgias, neck pain and neck stiffness.   Skin: Negative.    Allergic/Immunologic: Negative.    Neurological: Positive for tremors, weakness, numbness and headaches.   Hematological: Negative.    Psychiatric/Behavioral: Positive for decreased concentration. The patient is nervous/anxious.         Objective      Physical Exam  Pulse 89   Ht 167.6 cm (65.98\")  "  Wt (!) 155 kg (341 lb 11.4 oz)   SpO2 98%   BMI 55.18 kg/m²     Body mass index is 55.18 kg/m².    GENERAL APPEARANCE: awake, alert & oriented x 3, in no acute distress and well developed, well nourished  PSYCH: normal mood and affect  LUNGS:  breathing nonlabored, no wheezing  EYES: sclera anicteric, pupils equal  CARDIOVASCULAR: palpable radial pulse bilaterally. Capillary refill less than 2 seconds  INTEGUMENTARY: skin intact, no clubbing, cyanosis  NEUROLOGIC:  Normal Sensation         Ortho Exam  Peripheral Vascular   Bilateral Upper Extremity    No cyanotic nail beds    Pink nail beds and rapid capillary refill   Palpation    Radial Pulse - Bilaterally normal    Neurologic   Sensory: Light touch intact- Right and left hand    Left Upper Extremity    Left wrist extensors: 5/5    Left wrist flexors: 5/5    Left intrinsics: 5/5      Right Upper Extremity    Right wrist extensors: 4/5    Right wrist flexors: 4/5    Right intrinsics: 4/5    Musculoskeletal   Left Elbow    Forearm supination: AROM - 90 degrees    Forearm pronation: AROM - 90 degrees   Right Elbow    Forearm supination: AROM - 90 degrees    Forearm pronation: AROM - 90 degrees     Inspection and Palpation   Right Wrist      Tenderness - none    Swelling - none    Crepitus - none    Muscle tone - no atrophy   Left Wrist    Tenderness - none    Swelling - none    Crepitus - none    Muscle tone - no atrophy     ROJM:   Left Wrist    Flexion: AROM - 90 degrees    Extension: AROM - 90 degrees   Right Wrist    Flexion: AROM - 90 degrees    Extension: AROM - 90 degrees     Deformities, Malalignments, Discrepancies    None     Functional Testing   Right    Tinel's Sign-- negative    Phalen's Sign--negative    Carpal Compression Test--negative    Thenar wasting--none    APB--4+/5    Elbow Flexion test--negative    Cubital tunnel signs--negative   Left     Tinel's Sign--positive    Phalen's Sign--negative    Carpal Compression Test--negative    Thenar  Wasting--minmal    APB--4+/5    Elbow Flexion test--negative    Cubital tunnel signs--negative       Strength and Tone    Right  strength: fair    Left  strength: good     Hand Exam: Stiffness noted right hand/digits      Imaging/Studies  Ordered bilateral wrist plain films.  Imaging read by Dr. Iglesias  .  Imaging Results (Last 7 Days)     Procedure Component Value Units Date/Time    XR Wrist 3+ View Bilateral [[id number]] Resulted:  12/17/19 0911     Updated:  12/17/19 0912    Narrative:       Bilateral Wrist X-Ray    Indication: Pain    Views:  AP, Lateral, and Oblique     Comparison: None    Findings:  No fracture  No bony lesion seen in the left wrist.  On the right wrist, there is a   well-circumscribed cyst at the waist of the scaphoid.  Normal soft tissues  Normal joint spaces    Impression:   Negative bilateral wrist x-ray for acute bony abnormality.            Reviewed EMG/NCS performed on 12/3/2019 and read by Dr. Vic Rubin.  Positive prolongation of left median nerve motor latency 8.3 ms.  Positive prolongation right median nerve motor latency 6.0 ms.  Indicating median nerve neuropathy bilateral wrist, severe on the left and moderate on right.    Assessment/Plan        ICD-10-CM ICD-9-CM   1. Bilateral hand pain M79.641 729.5    M79.642    2. Numbness and tingling in left hand R20.0 782.0    R20.2    3. Swelling of right hand M79.89 729.81   4. Stiffness of finger joint of right hand M25.641 719.54   5. BMI 50.0-59.9, adult (CMS/[facility]) Z68.43 V85.43       Orders Placed This Encounter   Procedures   • Medium Joint Arthrocentesis: R radiocarpal   • Medium Joint Arthrocentesis: L radiocarpal   • XR Wrist 3+ View Bilateral        -Bilateral hand pain with numbness and tingling noted to the left hand as well as stiffness and swelling noted to the right hand.  EMG/NCS does show carpal tunnel syndrome bilateral wrist however she has improved with physical therapy as well as bracing.    -We discussed  various treatment options today to include corticosteroid injection as both diagnostic and therapeutic.  She wishes to proceed with these injections today.  Injections were given.  -Continue with PT and bracing.  -Continue followed up with rheumatologist for evaluation of autoimmune ideology.  Patient does have an appointment with a neurologist.  -Continue use of ibuprofen and Tylenol as needed for pain control.  -Patient has a BMI of 56.3 which needs to be watched.  Briefly discussed weight loss and nutrition.  Patient reports she is working on it..  -Follow-up 6 weeks for repeat evaluation, sooner if issues arise or symptoms worsen/change.  -Questions and concerns answered.    After discussing the risks, benefits, indications of injection, the patient gave consent to proceed.  Bilateral wrist carpal tunnels were confirmed as the correct sites to be injected with a timeout.  It was then prepped using Hibiclens and injected with a mixture of 1 cc of 1% plain lidocaine and 1 cc of Kenalog (40 mg per mL), without any resistance through the volar approach, patient in seated position.  Area was cleaned, hemostasis was achieved and a Band-Aid was applied over the injection site.  The patient tolerated procedure well.  I instructed the patient on signs and symptoms of infection.  They should report to the emergency department or return to clinic if any of these develop, for further evaluation and treatment.  Recommended modifying activity for the next 48 hours to include rest, ice, elevation and oral pain medication as needed.        Medical Decision Making  Management Options : over-the-counter medicine, prescription/IM medicine and physical/occupational therapy  Data/Risk: radiology tests       Michelle Sauer PA-C  12/19/19  9:31 AM         EMR Dragon/Transcription disclaimer:  Much of this encounter note is an electronic transcription of spoken language to printed text. Electronic transcription of spoken  language may permit erroneous, or at times, nonsensical words or phrases to be inadvertently transcribed. Although I have reviewed the note for such errors, some may still exist.

## 2019-12-17 NOTE — PROGRESS NOTES
Procedure   Medium Joint Arthrocentesis: R radiocarpal  Date/Time: 12/17/2019 9:26 AM  Consent given by: patient  Site marked: site marked  Timeout: Immediately prior to procedure a time out was called to verify the correct patient, procedure, equipment, support staff and site/side marked as required   Supporting Documentation  Indications: pain   Procedure Details  Location: wrist - R radiocarpal  Preparation: Patient was prepped and draped in the usual sterile fashion  Needle size: 22 G  Approach: anteromedial  Medications administered: 1 mL lidocaine PF 1% 1 %; 40 mg triamcinolone acetonide 40 MG/ML  Patient tolerance: patient tolerated the procedure well with no immediate complications    Medium Joint Arthrocentesis: L radiocarpal  Date/Time: 12/17/2019 9:26 AM  Consent given by: patient  Site marked: site marked  Timeout: Immediately prior to procedure a time out was called to verify the correct patient, procedure, equipment, support staff and site/side marked as required   Supporting Documentation  Indications: pain   Procedure Details  Location: wrist - L radiocarpal  Preparation: Patient was prepped and draped in the usual sterile fashion  Needle size: 22 G  Approach: anteromedial  Medications administered: 40 mg triamcinolone acetonide 40 MG/ML; 1 mL lidocaine PF 1% 1 %  Patient tolerance: patient tolerated the procedure well with no immediate complications

## 2019-12-19 ENCOUNTER — OFFICE VISIT (OUTPATIENT)
Dept: NEUROLOGY | Facility: CLINIC | Age: 34
End: 2019-12-19

## 2019-12-19 VITALS
DIASTOLIC BLOOD PRESSURE: 84 MMHG | HEART RATE: 77 BPM | BODY MASS INDEX: 48.82 KG/M2 | HEIGHT: 65 IN | WEIGHT: 293 LBS | SYSTOLIC BLOOD PRESSURE: 124 MMHG | OXYGEN SATURATION: 99 %

## 2019-12-19 DIAGNOSIS — G56.03 BILATERAL CARPAL TUNNEL SYNDROME: Primary | ICD-10-CM

## 2019-12-19 PROCEDURE — 99204 OFFICE O/P NEW MOD 45 MIN: CPT | Performed by: PSYCHIATRY & NEUROLOGY

## 2019-12-19 RX ORDER — LIDOCAINE HYDROCHLORIDE 10 MG/ML
1 INJECTION, SOLUTION EPIDURAL; INFILTRATION; INTRACAUDAL; PERINEURAL
Status: COMPLETED | OUTPATIENT
Start: 2019-12-17 | End: 2019-12-17

## 2019-12-19 RX ORDER — TRIAMCINOLONE ACETONIDE 40 MG/ML
40 INJECTION, SUSPENSION INTRA-ARTICULAR; INTRAMUSCULAR
Status: COMPLETED | OUTPATIENT
Start: 2019-12-17 | End: 2019-12-17

## 2019-12-19 RX ORDER — IBUPROFEN 200 MG
200 TABLET ORAL EVERY 6 HOURS PRN
COMMUNITY
End: 2020-11-11

## 2019-12-19 NOTE — PROGRESS NOTES
Subjective:    CC: Maren Ray is seen today in consultation at the request of Mimi Bliss MD for Carpal Tunnel and Fibromyalgia       HPI:  34-year-old female with a history of anxiety, depression, headaches presents with pain and numbness in both hands.  As per patient she started having numbness in her left arm 2 years ago.  After that she underwent physical therapy which helped relieve the numbness in her arm however her fingers continue to stay numb.  She also started having symptoms in the right hand with swelling and stiffness of the joints as well as weakness of the hand and numbness in the fingertips.  Patient states she has a weak  strength on both sides.  She has been getting physical therapy which has helped.  Also got steroid shots in both wrists recently by orthopedic that helped reduce the pain in her hand but did not help with the numbness.  Patient has been wearing wrist splints at night and sometimes even during the day for the past 2 years and it has helped a little.  She recently underwent an EMG/NCS that showed severe median neuropathy on the left and moderate median neuropathy on the right.  Did not have any evidence of cervical radiculopathy or ulnar neuropathy.  Patient says since she was a child she has had arthralgias, myalgias and rashes and has been worked up extensively for autoimmune diseases however everything has been negative thus far.  Her  rheumatologist now feels that she may have fibromyalgia as she has diffuse joint pains.    The following portions of the patient's history were reviewed today and updated as of 12/19/2019  : allergies, current medications, past family history, past medical history, past social history, past surgical history and problem list  These document will be scanned to patient's chart.      Current Outpatient Medications:   •  ibuprofen (ADVIL,MOTRIN) 200 MG tablet, Take 200 mg by mouth Every 6 (Six) Hours As Needed for Mild Pain ., Disp: ,  "Rfl:   •  sertraline (ZOLOFT) 50 MG tablet, Take 1 tablet by mouth Daily., Disp: 90 tablet, Rfl: 3  •  TiZANidine (ZANAFLEX) 2 MG capsule, Take 2 mg by mouth every night at bedtime., Disp: , Rfl: 1   Past Medical History:   Diagnosis Date   • Acid reflux    • Allergic    • Anxiety    • Arthritis    • Bronchitis    • Depression    • History of ear infections    • Hypotension    • Hypotension    • Migraines    • Sinusitis    • Urinary tract infection    • Vaginal delivery 04/21/2017      Past Surgical History:   Procedure Laterality Date   • MOUTH SURGERY     • WISDOM TOOTH EXTRACTION        Family History   Problem Relation Age of Onset   • Stroke Mother    • Hypotension Mother    • Cancer Father    • Diabetes Father    • Hypertension Father    • Diabetes Brother    • Bipolar disorder Brother    • Cancer Paternal Grandmother    • Heart disease Paternal Grandmother    • Ovarian cancer Paternal Grandmother 70   • Diabetes Paternal Grandfather    • Cancer Paternal Grandfather       Social History     Socioeconomic History   • Marital status:      Spouse name: Not on file   • Number of children: Not on file   • Years of education: Not on file   • Highest education level: Not on file   Tobacco Use   • Smoking status: Never Smoker   • Smokeless tobacco: Never Used   Substance and Sexual Activity   • Alcohol use: No   • Drug use: No   • Sexual activity: Defer     Review of Systems   Constitutional: Positive for fatigue.   Musculoskeletal: Positive for arthralgias and myalgias.   Neurological: Positive for weakness and numbness.   All other systems reviewed and are negative.      Objective:    /84   Pulse 77   Ht 165.1 cm (65\")   Wt (!) 153 kg (338 lb)   SpO2 99%   BMI 56.25 kg/m²     Neurology Exam:    General apperance: Morbidly obese    Mental status: Alert, awake and oriented to time place and person.    Recent and Remote memory: Intact.    Attention span and Concentration: Normal.     Language and " Speech: Intact- No dysarthria.    Fluency, Naming , Repitition and Comprehension:  Intact    Cranial Nerves:   CN II: Visual fields are full. Intact. Fundi - Normal, No papillederma, Pupils - MARY BETH  CN III, IV and VI: Extraocular movements are intact. Normal saccades.   CN V: Facial sensation is intact.   CN VII: Muscles of facial expression reveal no asymmetry. Intact.   CN VIII: Hearing is intact. Whispered voice intact.   CN IX and X: Palate elevates symmetrically. Intact  CN XI: Shoulder shrug is intact.   CN XII: Tongue is midline without evidence of atrophy or fasciculation.     Ophthalmoscopic exam of optic disc-normal    Motor:-Tinel's and Phalen's test was negative    Right UE muscle strength 5/5. Normal tone.  Weak hand  bilaterally    Left UE muscle strength 5/5. Normal tone.      Right LE muscle strength5/5. Normal tone.     Left LE muscle strength 5/5. Normal tone.      Sensory: Patchy reduction in sensation in both hands.  Also reduced sensation in the thenar eminence of left hand    DTRs: 2+ bilaterally in upper and lower extremities.    Babinski: Negative bilaterally.    Co-ordination: Normal finger-to-nose, heel to shin B/L.    Rhomberg: Negative.    Gait: Normal.    Cardiovascular: Regular rate and rhythm without murmur, gallop or rub.    Assessment and Plan:  1. Bilateral carpal tunnel syndrome  Patient has evidence of bilateral left more than right median neuropathy.    She has tried all conservative measures but her symptoms have persisted hence I will refer her to a hand surgeon for a nerve release procedure  I have told her to continue wearing wrist splints  I also explained to her that the swelling in her hands as well as the joint pains is more likely to be related to fibromyalgia or arthralgia rather than CTS       Return in about 3 months (around 3/19/2020).       Yoly Leyva MD

## 2019-12-20 ENCOUNTER — TREATMENT (OUTPATIENT)
Dept: PHYSICAL THERAPY | Facility: CLINIC | Age: 34
End: 2019-12-20

## 2019-12-20 DIAGNOSIS — G56.03 BILATERAL CARPAL TUNNEL SYNDROME: Primary | ICD-10-CM

## 2019-12-20 PROCEDURE — G0283 ELEC STIM OTHER THAN WOUND: HCPCS | Performed by: PHYSICAL THERAPIST

## 2019-12-20 PROCEDURE — 97110 THERAPEUTIC EXERCISES: CPT | Performed by: PHYSICAL THERAPIST

## 2019-12-20 NOTE — PROGRESS NOTES
Physical Therapy Daily Progress Note    TOTAL TIME: 65 MINUTES    Marendarius Ray reports: patient reports she had a very good appt with PA at ortho office; got injections bilaterally in ventral wrists, which seem to have helped a lot; states she also saw a neurologist who recommended she have surgery right away, and stated she would be set up with a hand surgeon       Objective   See Exercise, Manual, and Modality Logs for complete treatment.     Patient able to make full composite fist bilaterally without difficulty today, much more ROM in digits   strength:  Right 41# ( strength was 3-5# bilaterally at first visit)  Left 42#    THERAPEUTIC EXERCISES/ACTIVITIES ADDED TODAY: digi flex, yellow digi web finger extensions     Assessment/Plan  Patient has made a lot of progress overall,  strength much improved this week    Progress per Plan of Care           Manual Therapy:         mins  04404;  Therapeutic Exercise:    50     mins  35570;     Neuromuscular Derrick:        mins  11434;    Therapeutic Activity:          mins  99913;     Gait Training:           mins  00649;     Ultrasound:          mins  13837;    Electrical Stimulation:    15     mins  62637 ( );  Dry Needling          mins self-pay    Timed Treatment:   65   mins   Total Treatment:     65   mins    Wojciech Leyva, PT  Physical Therapist

## 2020-01-24 ENCOUNTER — TELEPHONE (OUTPATIENT)
Dept: FAMILY MEDICINE CLINIC | Facility: CLINIC | Age: 35
End: 2020-01-24

## 2020-01-24 NOTE — TELEPHONE ENCOUNTER
PT  NEEDS  REFERRAL TO BE  SENT TO INSURANCE  FOR   [facility]  BUT IN ORDER FOR INSURANCE TO PAY  SHE NEEDS PCP TO SEND A REFERRAL TO INSURANCE  FOR  [facility]  PHONE NUMBER IS  [phone]    PLEASE ADVISE AND GIVE CALL [phone] (H)

## 2020-01-24 NOTE — TELEPHONE ENCOUNTER
Patient is requesting Dr. Bliss send a referral for a hand surgeon. Patient's insurance is requesting a referral from her PCP.     Patient call back [phone]

## 2020-01-27 ENCOUNTER — TELEPHONE (OUTPATIENT)
Dept: NEUROLOGY | Facility: CLINIC | Age: 35
End: 2020-01-27

## 2020-01-27 DIAGNOSIS — G56.03 BILATERAL CARPAL TUNNEL SYNDROME: Primary | ICD-10-CM

## 2020-01-27 NOTE — TELEPHONE ENCOUNTER
Called and informed pt of everything is going on. She stated that by her insurance they want her to be referred by her primary care office and not from a specialty.   She stated that Dr. Bliss will handle the referral.   Canceled our referral.

## 2020-01-27 NOTE — TELEPHONE ENCOUNTER
Im having trouble finding a hand specialist. And Dr. Murray is with [facility].   Should we try [facility]?

## 2020-01-27 NOTE — TELEPHONE ENCOUNTER
Received fax from Dr. Murray that they do not accept the patients insurance and wont schedule pt. Please advise.

## 2020-01-30 ENCOUNTER — OFFICE VISIT (OUTPATIENT)
Dept: ORTHOPEDIC SURGERY | Facility: CLINIC | Age: 35
End: 2020-01-30

## 2020-01-30 VITALS — WEIGHT: 293 LBS | OXYGEN SATURATION: 99 % | HEIGHT: 65 IN | BODY MASS INDEX: 48.82 KG/M2 | HEART RATE: 81 BPM

## 2020-01-30 DIAGNOSIS — M79.641 BILATERAL HAND PAIN: Primary | ICD-10-CM

## 2020-01-30 DIAGNOSIS — M79.642 BILATERAL HAND PAIN: Primary | ICD-10-CM

## 2020-01-30 DIAGNOSIS — G56.03 BILATERAL CARPAL TUNNEL SYNDROME: ICD-10-CM

## 2020-01-30 PROCEDURE — 99212 OFFICE O/P EST SF 10 MIN: CPT | Performed by: PHYSICIAN ASSISTANT

## 2020-01-31 ENCOUNTER — TREATMENT (OUTPATIENT)
Dept: PHYSICAL THERAPY | Facility: CLINIC | Age: 35
End: 2020-01-31

## 2020-01-31 DIAGNOSIS — G56.03 BILATERAL CARPAL TUNNEL SYNDROME: Primary | ICD-10-CM

## 2020-01-31 PROCEDURE — 97110 THERAPEUTIC EXERCISES: CPT | Performed by: PHYSICAL THERAPIST

## 2020-02-06 NOTE — PROGRESS NOTES
Physical Therapy Daily Progress Note     TOTAL TIME: 60 MINUTES    Maren Ray reports: feeling a lot better,  is a lot better, not having any trouble gripping or holding items anymore; shoulder still gets sore, along with other joints, but not as bad; may have CTS surgery as needed, meeting with hand surgeon       Objective   See Exercise, Manual, and Modality Logs for complete treatment.     THERAPEUTIC EXERCISES/ACTIVITIES ADDED TODAY: added resistance to putty and tband exercises    Assessment/Plan  Patient is doing very well, she has improved her  strength to 63# bilaterally which is improved from 2-3#; she will continue with HEP and call back for PT as needed     Other hold therapy, schedule as needed            Manual Therapy:         mins  55633;  Therapeutic Exercise:    45     mins  56676;     Neuromuscular Derrick:        mins  61269;    Therapeutic Activity:          mins  56889;     Gait Training:           mins  25884;     Ultrasound:          mins  76422;    Electrical Stimulation:         mins  19062 ( );  Dry Needling          mins self-pay    Timed Treatment:   45   mins   Total Treatment:     45   mins    Wojciech Leyva, PT  Physical Therapist

## 2020-02-24 DIAGNOSIS — M79.7 FIBROMYALGIA: Primary | ICD-10-CM

## 2020-06-11 ENCOUNTER — OFFICE VISIT (OUTPATIENT)
Dept: ORTHOPEDIC SURGERY | Facility: CLINIC | Age: 35
End: 2020-06-11

## 2020-06-11 VITALS — HEIGHT: 65 IN | OXYGEN SATURATION: 99 % | WEIGHT: 293 LBS | HEART RATE: 86 BPM | BODY MASS INDEX: 48.82 KG/M2

## 2020-06-11 DIAGNOSIS — G56.03 BILATERAL CARPAL TUNNEL SYNDROME: ICD-10-CM

## 2020-06-11 DIAGNOSIS — M79.641 BILATERAL HAND PAIN: Primary | ICD-10-CM

## 2020-06-11 DIAGNOSIS — M79.642 BILATERAL HAND PAIN: Primary | ICD-10-CM

## 2020-06-11 PROCEDURE — 99212 OFFICE O/P EST SF 10 MIN: CPT | Performed by: PHYSICIAN ASSISTANT

## 2020-06-11 NOTE — PROGRESS NOTES
"    [facility] [place]        Subjective     CC: Follow-up (4 month f/u; Bilateral carpal tunnel syndrome )      HPI    Maren Ray is a 34 y.o. female.  Patient returns today for follow-up bilateral carpal tunnel.  She states that she is not having any numbness or tingling in her digits.  Her only complaint is some mild wrist pain with pain scale maximum 2/10.  States she only has this pain with gripping or pinching activities.  She has begun to see a new rheumatologist and has noted improvement in her joint swelling and stiffness.    Rheumatologist has her on a diabetic diet for weight loss.  She is been diagnosed with sleep apnea and this is being addressed.  She states her arthralgias, myalgias and fatigue have improved since starting with the new rheumatologist.    ROS:    Constiutional:Pt denies fever, chills, nausea, or vomiting.  MSK:as above        Objective      Past Medical History  Past Medical History:   Diagnosis Date   • Acid reflux    • Allergic    • Anxiety    • Arthritis    • Bronchitis    • Depression    • History of ear infections    • Hypotension    • Hypotension    • Migraines    • Sinusitis    • Urinary tract infection    • Vaginal delivery 04/21/2017         Physical Exam  Pulse 86   Ht 165.1 cm (65\")   Wt (!) 155 kg (340 lb 13.3 oz)   SpO2 99%   Breastfeeding No   BMI 56.72 kg/m²     Body mass index is 56.72 kg/m².    Patient is well nourished and well developed.        Ortho Exam  Bilateral wrists  Symptoms are worse on the left side.  Skin: Grossly intact without any redness, warmth or swelling.  No rashes or lesions noted.  Motion: Near normal range of motion of the wrists.  Once again patient is able to make a composite fist without difficulty.    Testing: Tinel's carpal tunnel, Phalen's and median nerve compression negative.  Tinel's elbow and elbow flexion test negative.   strength: Bilaterally good with 4/5 APB on left and 4+/5 APB on right.  "       Imaging/Labs/EMG Reviewed:  No new imaging today.      Assessment:  1. Bilateral hand pain    2. Bilateral carpal tunnel syndrome    3. BMI 50.0-59.9, adult (CMS/[facility])        Plan:  1. Bilateral hand pain with bilateral carpal tunnel syndrome.  Patient currently denies any numbness or tingling since undergoing corticosteroid injections in December 2019.  At this time she only complains of wrist pain with gripping and pinching type activities.  2. Patient has a BMI of 56.7 which needs to be watched.  She will continue with her diabetic diet along with her other various treatments regarding sleep apnea and fibromyalgia.  Overall she states she feels better.  Once again we did discuss that if she would like to proceed with elective procedures such as carpal tunnel release we need to have her BMI around 50 which for her height would between 300-309 pounds.  3. If she has any return of numbness or tingling she needs to return to our clinic for evaluation and anticipate proceeding with carpal tunnel release at that time.  With the decreased in swelling and stiffness her symptoms have improved so for now we will wait on proceeding with carpal tunnel release.  4. Continue use of braces at night.  5. Follow-up as needed.  Return sooner if issues arise or symptoms worsen/change.    Case discussed with Dr. Iglesias who agrees with the above assessment and plan.      Michelle Sauer PA-C  06/11/20  10:35

## 2020-08-15 PROCEDURE — U0003 INFECTIOUS AGENT DETECTION BY NUCLEIC ACID (DNA OR RNA); SEVERE ACUTE RESPIRATORY SYNDROME CORONAVIRUS 2 (SARS-COV-2) (CORONAVIRUS DISEASE [COVID-19]), AMPLIFIED PROBE TECHNIQUE, MAKING USE OF HIGH THROUGHPUT TECHNOLOGIES AS DESCRIBED BY CMS-2020-01-R: HCPCS | Performed by: NURSE PRACTITIONER

## 2020-08-17 ENCOUNTER — TELEPHONE (OUTPATIENT)
Dept: URGENT CARE | Facility: CLINIC | Age: 35
End: 2020-08-17

## 2020-08-17 NOTE — TELEPHONE ENCOUNTER
----- Message from DARRIUS Navarrete sent at 8/17/2020  6:54 PM EDT -----  COVID not detected.  Patient notified of results via garbst.  She should follow CDC guidelines for return to work and quarantine.  She should follow-up with primary care provider    Spoke with Pt informed lab result was not detected. Informed Pt the CDC still recommends 10 day quarantine for 10 days from onset of symptoms. Pt verbalized understanding stated she was feeling better with the antibiotic prescribed no further questions

## 2020-08-20 ENCOUNTER — LAB (OUTPATIENT)
Dept: LAB | Facility: HOSPITAL | Age: 35
End: 2020-08-20

## 2020-08-20 ENCOUNTER — TRANSCRIBE ORDERS (OUTPATIENT)
Dept: LAB | Facility: HOSPITAL | Age: 35
End: 2020-08-20

## 2020-08-20 DIAGNOSIS — Z34.82 PRENATAL CARE, SUBSEQUENT PREGNANCY, SECOND TRIMESTER: Primary | ICD-10-CM

## 2020-08-20 DIAGNOSIS — Z34.82 PRENATAL CARE, SUBSEQUENT PREGNANCY, SECOND TRIMESTER: ICD-10-CM

## 2020-08-20 DIAGNOSIS — Z3A.14 14 WEEKS GESTATION OF PREGNANCY: ICD-10-CM

## 2020-08-20 LAB
ABO GROUP BLD: NORMAL
AMPHET+METHAMPHET UR QL: NEGATIVE
AMPHETAMINES UR QL: NEGATIVE
BARBITURATES UR QL SCN: NEGATIVE
BASOPHILS # BLD AUTO: 0.01 10*3/MM3 (ref 0–0.2)
BASOPHILS NFR BLD AUTO: 0.1 % (ref 0–1.5)
BENZODIAZ UR QL SCN: NEGATIVE
BLD GP AB SCN SERPL QL: NEGATIVE
BUPRENORPHINE SERPL-MCNC: NEGATIVE NG/ML
CANNABINOIDS SERPL QL: NEGATIVE
COCAINE UR QL: NEGATIVE
DEPRECATED RDW RBC AUTO: 42.9 FL (ref 37–54)
EOSINOPHIL # BLD AUTO: 0.12 10*3/MM3 (ref 0–0.4)
EOSINOPHIL NFR BLD AUTO: 1.7 % (ref 0.3–6.2)
ERYTHROCYTE [DISTWIDTH] IN BLOOD BY AUTOMATED COUNT: 13.6 % (ref 12.3–15.4)
GLUCOSE SERPL-MCNC: 89 MG/DL (ref 65–99)
HCT VFR BLD AUTO: 37.9 % (ref 34–46.6)
HGB BLD-MCNC: 12.9 G/DL (ref 12–15.9)
IMM GRANULOCYTES # BLD AUTO: 0.06 10*3/MM3 (ref 0–0.05)
IMM GRANULOCYTES NFR BLD AUTO: 0.9 % (ref 0–0.5)
LYMPHOCYTES # BLD AUTO: 1.57 10*3/MM3 (ref 0.7–3.1)
LYMPHOCYTES NFR BLD AUTO: 22.3 % (ref 19.6–45.3)
MCH RBC QN AUTO: 29.6 PG (ref 26.6–33)
MCHC RBC AUTO-ENTMCNC: 34 G/DL (ref 31.5–35.7)
MCV RBC AUTO: 86.9 FL (ref 79–97)
METHADONE UR QL SCN: NEGATIVE
MONOCYTES # BLD AUTO: 0.39 10*3/MM3 (ref 0.1–0.9)
MONOCYTES NFR BLD AUTO: 5.5 % (ref 5–12)
NEUTROPHILS NFR BLD AUTO: 4.9 10*3/MM3 (ref 1.7–7)
NEUTROPHILS NFR BLD AUTO: 69.5 % (ref 42.7–76)
NRBC BLD AUTO-RTO: 0 /100 WBC (ref 0–0.2)
OPIATES UR QL: NEGATIVE
OXYCODONE UR QL SCN: NEGATIVE
PCP UR QL SCN: NEGATIVE
PLATELET # BLD AUTO: 291 10*3/MM3 (ref 140–450)
PMV BLD AUTO: 10.3 FL (ref 6–12)
PROPOXYPH UR QL: NEGATIVE
RBC # BLD AUTO: 4.36 10*6/MM3 (ref 3.77–5.28)
RH BLD: POSITIVE
TRICYCLICS UR QL SCN: NEGATIVE
WBC # BLD AUTO: 7.05 10*3/MM3 (ref 3.4–10.8)

## 2020-08-20 PROCEDURE — 84443 ASSAY THYROID STIM HORMONE: CPT

## 2020-08-20 PROCEDURE — 83036 HEMOGLOBIN GLYCOSYLATED A1C: CPT

## 2020-08-20 PROCEDURE — 86803 HEPATITIS C AB TEST: CPT

## 2020-08-20 PROCEDURE — 86900 BLOOD TYPING SEROLOGIC ABO: CPT

## 2020-08-20 PROCEDURE — 86901 BLOOD TYPING SEROLOGIC RH(D): CPT

## 2020-08-20 PROCEDURE — 86850 RBC ANTIBODY SCREEN: CPT

## 2020-08-20 PROCEDURE — 82947 ASSAY GLUCOSE BLOOD QUANT: CPT

## 2020-08-20 PROCEDURE — 85025 COMPLETE CBC W/AUTO DIFF WBC: CPT

## 2020-08-20 PROCEDURE — 36415 COLL VENOUS BLD VENIPUNCTURE: CPT

## 2020-08-20 PROCEDURE — 80306 DRUG TEST PRSMV INSTRMNT: CPT

## 2020-08-20 PROCEDURE — 80081 OBSTETRIC PANEL INC HIV TSTG: CPT

## 2020-08-20 PROCEDURE — 81001 URINALYSIS AUTO W/SCOPE: CPT

## 2020-08-21 LAB
AMORPH URATE CRY URNS QL MICRO: ABNORMAL /HPF
BACTERIA UR QL AUTO: ABNORMAL /HPF
BILIRUB UR QL STRIP: NEGATIVE
CLARITY UR: ABNORMAL
COLOR UR: ABNORMAL
GLUCOSE UR STRIP-MCNC: NEGATIVE MG/DL
HBA1C MFR BLD: 5.4 % (ref 4.8–5.6)
HBV SURFACE AG SERPL QL IA: NORMAL
HCV AB SER DONR QL: NORMAL
HGB UR QL STRIP.AUTO: NEGATIVE
HIV1+2 AB SER QL: NORMAL
HOLD SPECIMEN: NORMAL
HYALINE CASTS UR QL AUTO: ABNORMAL /LPF
KETONES UR QL STRIP: ABNORMAL
LEUKOCYTE ESTERASE UR QL STRIP.AUTO: ABNORMAL
NITRITE UR QL STRIP: NEGATIVE
PH UR STRIP.AUTO: <=5 [PH] (ref 5–8)
PROT UR QL STRIP: NEGATIVE
RBC # UR: ABNORMAL /HPF
REF LAB TEST METHOD: ABNORMAL
RPR SER QL: NORMAL
RUBV IGG SERPL IA-ACNC: POSITIVE
SP GR UR STRIP: >=1.03 (ref 1–1.03)
SQUAMOUS #/AREA URNS HPF: ABNORMAL /HPF
TSH SERPL DL<=0.05 MIU/L-ACNC: 1.31 UIU/ML (ref 0.27–4.2)
UROBILINOGEN UR QL STRIP: ABNORMAL
WBC UR QL AUTO: ABNORMAL /HPF

## 2020-10-23 ENCOUNTER — TRANSCRIBE ORDERS (OUTPATIENT)
Dept: LAB | Facility: HOSPITAL | Age: 35
End: 2020-10-23

## 2020-10-23 ENCOUNTER — LAB (OUTPATIENT)
Dept: LAB | Facility: HOSPITAL | Age: 35
End: 2020-10-23

## 2020-10-23 DIAGNOSIS — Z3A.24 24 WEEKS GESTATION OF PREGNANCY: Primary | ICD-10-CM

## 2020-10-23 DIAGNOSIS — Z3A.24 24 WEEKS GESTATION OF PREGNANCY: ICD-10-CM

## 2020-10-23 LAB
BLD GP AB SCN SERPL QL: NEGATIVE
DEPRECATED RDW RBC AUTO: 43.7 FL (ref 37–54)
ERYTHROCYTE [DISTWIDTH] IN BLOOD BY AUTOMATED COUNT: 13 % (ref 12.3–15.4)
GLUCOSE 1H P 100 G GLC PO SERPL-MCNC: 201 MG/DL (ref 65–140)
HCT VFR BLD AUTO: 37.2 % (ref 34–46.6)
HGB BLD-MCNC: 12.1 G/DL (ref 12–15.9)
MCH RBC QN AUTO: 30 PG (ref 26.6–33)
MCHC RBC AUTO-ENTMCNC: 32.5 G/DL (ref 31.5–35.7)
MCV RBC AUTO: 92.3 FL (ref 79–97)
PLATELET # BLD AUTO: 297 10*3/MM3 (ref 140–450)
PMV BLD AUTO: 10.6 FL (ref 6–12)
RBC # BLD AUTO: 4.03 10*6/MM3 (ref 3.77–5.28)
WBC # BLD AUTO: 9.02 10*3/MM3 (ref 3.4–10.8)

## 2020-10-23 PROCEDURE — 36415 COLL VENOUS BLD VENIPUNCTURE: CPT

## 2020-10-23 PROCEDURE — 86850 RBC ANTIBODY SCREEN: CPT

## 2020-10-23 PROCEDURE — 85027 COMPLETE CBC AUTOMATED: CPT

## 2020-10-23 PROCEDURE — 82950 GLUCOSE TEST: CPT

## 2020-11-11 ENCOUNTER — OFFICE VISIT (OUTPATIENT)
Dept: ENDOCRINOLOGY | Facility: CLINIC | Age: 35
End: 2020-11-11

## 2020-11-11 VITALS
OXYGEN SATURATION: 99 % | SYSTOLIC BLOOD PRESSURE: 124 MMHG | BODY MASS INDEX: 45.99 KG/M2 | HEIGHT: 67 IN | DIASTOLIC BLOOD PRESSURE: 62 MMHG | HEART RATE: 95 BPM | WEIGHT: 293 LBS

## 2020-11-11 DIAGNOSIS — O24.419 GESTATIONAL DIABETES MELLITUS (GDM), ANTEPARTUM, GESTATIONAL DIABETES METHOD OF CONTROL UNSPECIFIED: Primary | ICD-10-CM

## 2020-11-11 LAB
EXPIRATION DATE: NORMAL
GLUCOSE BLDC GLUCOMTR-MCNC: 106 MG/DL (ref 70–130)
Lab: NORMAL

## 2020-11-11 PROCEDURE — 99204 OFFICE O/P NEW MOD 45 MIN: CPT | Performed by: INTERNAL MEDICINE

## 2020-11-11 PROCEDURE — 82947 ASSAY GLUCOSE BLOOD QUANT: CPT | Performed by: INTERNAL MEDICINE

## 2020-11-11 RX ORDER — BLOOD SUGAR DIAGNOSTIC
STRIP MISCELLANEOUS
Qty: 100 EACH | Refills: 4 | Status: SHIPPED | OUTPATIENT
Start: 2020-11-11 | End: 2021-02-05 | Stop reason: HOSPADM

## 2020-11-11 RX ORDER — INSULIN DETEMIR 100 [IU]/ML
INJECTION, SOLUTION SUBCUTANEOUS
Qty: 5 PEN | Refills: 2 | Status: SHIPPED | OUTPATIENT
Start: 2020-11-11 | End: 2020-12-11 | Stop reason: ALTCHOICE

## 2020-11-11 RX ORDER — SERTRALINE HYDROCHLORIDE 100 MG/1
100 TABLET, FILM COATED ORAL DAILY
COMMUNITY
Start: 2020-10-19 | End: 2022-09-21

## 2020-11-11 RX ORDER — PRENATAL VIT NO.126/IRON/FOLIC 28MG-0.8MG
1 TABLET ORAL DAILY
COMMUNITY
End: 2022-09-21

## 2020-11-11 RX ORDER — LANCETS
EACH MISCELLANEOUS
Qty: 100 EACH | Refills: 4 | Status: SHIPPED | OUTPATIENT
Start: 2020-11-11 | End: 2022-09-21

## 2020-11-11 NOTE — PROGRESS NOTES
Chief Complaint   Patient presents with   • Establish Care   • Gestational Diabetes        New patient who is being seen in consultation regarding gestational diabetes at the request of Fernanda Phoenix MD     HPI   Maren Ray is a 35 y.o. female who presents for evaluation of gestational diabetes.  Patient has been seen in this office previously for gestational diabetes, last seen by Dr. Rodriguez June 2017.    Patient reports that she is currently 26 weeks gestatation.  This is her third pregnancy.  Her two children are seven and 3 years old respectively.  She did not have gestational diabetes during her first pregnancy and her 7-year-old was born weighing 9 pounds and 5 ounces she did have gestational diabetes during her third pregnancy was managed on oral agents.  This baby was born at 39 weeks gestation weighing 8 pounds.  Patient reports that this has not been discussed yet but she expects we will plan to induce pregnancy around 39 weeks.  Patient denies any history of prediabetes or diabetes.  She does report a family history of diabetes in her dad, brother, grandparents.  Patient reports that she did not increase her risk of gestational diabetes she is started cutting back sugar from the beginning of this pregnancy.  She reports that she further cut back carbohydrates and eliminated fruit juice from her diet after formal diagnosis of gestational diabetes.  She has also been cutting back on her milk intake.  She estimates that her current carbohydrate intake is at or below the goals that were given in office today.  Patient has been monitoring glucose last 2 weeks.  Patient reports baby was measuring normally on most recent ultrasound, they are planning for repeat ultrasound next Thursday.    Fasting blood glucose ranging   1 hour after breakfast ranging 10 2-132  1 hour after lunch ranging 102-140  1 hour after dinner ranging       Past Medical History:   Diagnosis Date   • Acid reflux     • Allergic    • Anxiety    • Arthritis    • Bronchitis    • Depression    • Fibromyalgia    • History of ear infections    • Hypotension    • Hypotension    • Migraines    • Sinusitis    • Urinary tract infection    • Vaginal delivery 04/21/2017     Past Surgical History:   Procedure Laterality Date   • MOUTH SURGERY     • WISDOM TOOTH EXTRACTION        Family History   Problem Relation Age of Onset   • Stroke Mother    • Hypotension Mother    • Cancer Father    • Diabetes Father    • Hypertension Father    • Diabetes Brother    • Bipolar disorder Brother    • Cancer Paternal Grandmother    • Heart disease Paternal Grandmother    • Ovarian cancer Paternal Grandmother 70   • Diabetes Paternal Grandfather    • Cancer Paternal Grandfather       Social History     Socioeconomic History   • Marital status:      Spouse name: Not on file   • Number of children: Not on file   • Years of education: Not on file   • Highest education level: Not on file   Tobacco Use   • Smoking status: Never Smoker   • Smokeless tobacco: Never Used   Substance and Sexual Activity   • Alcohol use: Not Currently   • Drug use: No   • Sexual activity: Defer      Allergies   Allergen Reactions   • Sulfa Antibiotics Angioedema      Current Outpatient Medications on File Prior to Visit   Medication Sig Dispense Refill   • Acetaminophen (TYLENOL 8 HOUR PO) Take 1 tablet by mouth As Needed.     • prenatal vitamin (prenatal, CLASSIC, vitamin) tablet Take 1 tablet by mouth Daily.     • sertraline (ZOLOFT) 100 MG tablet Take 100 mg by mouth Daily.     • [DISCONTINUED] ibuprofen (ADVIL,MOTRIN) 200 MG tablet Take 200 mg by mouth Every 6 (Six) Hours As Needed for Mild Pain .     • [DISCONTINUED] sertraline (ZOLOFT) 50 MG tablet Take 1 tablet by mouth Daily. 90 tablet 3   • [DISCONTINUED] TiZANidine (ZANAFLEX) 2 MG capsule Take 2 mg by mouth As Needed.  1     No current facility-administered medications on file prior to visit.         Review of  "Systems   Constitutional: Positive for fatigue and unexpected weight gain.   HENT: Negative for trouble swallowing and voice change.    Eyes: Negative for pain and visual disturbance.   Respiratory: Negative for cough and shortness of breath.    Cardiovascular: Positive for palpitations. Negative for chest pain.   Gastrointestinal: Positive for constipation and nausea.   Endocrine: Negative for polydipsia and polyuria.   Musculoskeletal: Positive for arthralgias and myalgias.   Skin: Negative for dry skin and rash.   Neurological: Negative for tremors and headache.   Psychiatric/Behavioral: Negative for sleep disturbance. The patient is nervous/anxious.        Vitals:    11/11/20 1113   BP: 124/62   Pulse: 95   SpO2: 99%   Weight: (!) 154 kg (339 lb 3.2 oz)   Height: 170 cm (66.93\")   Body mass index is 53.24 kg/m².     Physical Exam  Vitals signs reviewed.   Constitutional:       General: She is not in acute distress.     Appearance: She is normal weight.   HENT:      Head: Normocephalic and atraumatic.      Right Ear: Hearing normal.      Left Ear: Hearing normal.      Nose: Nose normal.   Eyes:      General: Lids are normal.      Conjunctiva/sclera: Conjunctivae normal.   Neck:      Thyroid: No thyromegaly or thyroid tenderness.   Cardiovascular:      Rate and Rhythm: Normal rate and regular rhythm.      Heart sounds: No murmur.   Pulmonary:      Effort: Pulmonary effort is normal.      Breath sounds: Normal breath sounds and air entry.   Abdominal:      General: Bowel sounds are normal.      Palpations: Abdomen is soft.      Tenderness: There is no abdominal tenderness.   Lymphadenopathy:      Head:      Right side of head: No submandibular adenopathy.      Left side of head: No submandibular adenopathy.      Cervical: No cervical adenopathy.   Skin:     General: Skin is warm and dry.      Findings: No rash.   Neurological:      General: No focal deficit present.      Mental Status: She is alert.      Deep " Tendon Reflexes: Reflexes are normal and symmetric.   Psychiatric:         Mood and Affect: Mood and affect normal.         Behavior: Behavior is cooperative.            Labs/Imaging  Results for AGNES DE JESUS (MRN [id number]) as of 11/11/2020 12:00   Ref. Range 8/20/2020 14:46 10/23/2020 09:57 11/11/2020 11:28   Glucose Latest Ref Range: 70 - 130 mg/dL 89  106   Hemoglobin A1C Latest Ref Range: 4.80 - 5.60 % 5.40     TSH Baseline Latest Ref Range: 0.270 - 4.200 uIU/mL 1.310     Gestational GCT Latest Ref Range: 65 - 140 mg/dL  201 (H)        Assessment and Plan    Diagnoses and all orders for this visit:    1. Gestational diabetes mellitus (GDM), antepartum, gestational diabetes method of control unspecified (Primary)  -Diagnosed via screening GCT with value of 201, 3-hour OGTT was not performed  -Patient with gestational diabetes and previous pregnancy controlled with oral agents  -Patient has been monitoring glucose both fasting and postprandial  -Reviewed with patient that her fasting blood glucose levels are persistently above goal, thus far postprandial values have been normal  -Discussed with patient that there are fewer modifiable factors to aid in correction of fasting hyperglycemia.  Discussed my recommendation for initiation of basal insulin at bedtime to correct this.  Patient was agreeable  -Insulin injection teaching completed today, reviewed potential adverse effects of insulin therapy, specifically hypoglycemia.  Reviewed signs and symptoms of hypoglycemia and its management.  -Patient to start Levemir 6 units at bedtime.  She is given written instructions to titrate up by 2 units every other day until fasting glucose less than ninety-five  -Reviewed that gestational diabetes tends to become harder to control as pregnancy progresses and potential need for short acting insulin later in pregnancy  -Patient is not fasting and postprandial glucose data along with urine ketone data weekly via  MyChart  --Risks of gestational diabetes were reviewed, these include, but are not limited to: LGA infant, macrosomia, stillbirth,  hypoglycemia, hyperbilirubinemia, birth injury,  birth, respiratory complications following delivery, polyhydramnios, hypocalcemia, maternal preeclampsia.  -Nutritional goals reviewed: Patient advised to eat 3 moderate sized meals daily as well as 2-4 snacks, including a bedtime snack.  -Glycemic Targets during pregnancy were reviewed, as follows: fasting glucose <95, 1 hour post prandial <140, 2 hour postprandial <120.  -Patient will return for follow up in 4 weeks. She was instructed to contact the office in the interim if glucoses not at target.  -     POC Glucose, Blood         Return in about 4 weeks (around 2020). The patient was instructed to contact the clinic with any interval questions or concerns.    Lilia Delgadillo MD     Please note that portions of this document were completed using a voice recognition program. Efforts were made to edit the dictations, but occasionally words are mis-transcribed.

## 2020-11-13 ENCOUNTER — PRIOR AUTHORIZATION (OUTPATIENT)
Dept: ENDOCRINOLOGY | Facility: CLINIC | Age: 35
End: 2020-11-13

## 2020-11-14 ENCOUNTER — TELEPHONE (OUTPATIENT)
Dept: ENDOCRINOLOGY | Facility: CLINIC | Age: 35
End: 2020-11-14

## 2020-11-14 DIAGNOSIS — O24.414 INSULIN CONTROLLED GESTATIONAL DIABETES MELLITUS (GDM) IN THIRD TRIMESTER: Primary | ICD-10-CM

## 2020-11-14 RX ORDER — NAPROXEN SODIUM 220 MG
1 TABLET ORAL NIGHTLY
Qty: 30 EACH | Refills: 0 | Status: SHIPPED | OUTPATIENT
Start: 2020-11-14 | End: 2020-12-11 | Stop reason: SDUPTHER

## 2020-11-14 NOTE — TELEPHONE ENCOUNTER
Pt called because Levemir PA had not been approved and needed to to start insulin for gestational diabetes. Prescribed NPH 4 units nightly over the weekend until emergency PA could be completed on Monday.     Sent prescription for NPH vial 4 units nightly to [facility] for use now until Levemir could be filled.    Pt's  stated that he spoke with insurance and an emergency PA could be done over the phone for quicker approval for the Levemir. Phone number for emergency PA: [phone].     Signed: Sabrina Doe MD

## 2020-11-16 NOTE — TELEPHONE ENCOUNTER
Received a fax from [employer] they DENIED the PA for Levemir.    The preferred insulin is NPH insulin.      Dr. Delgadillo, please advise.  Thank you.

## 2020-11-16 NOTE — TELEPHONE ENCOUNTER
Patient states she spoke with her insurance during the weekend, they suggested we do an urgent PA so that they can approved the Levemir.  Did normal PA, but was denied, will do urgent PA.    Informed patient, per Dr. Delgadillo to stay on NPH insulin for now.

## 2020-12-11 ENCOUNTER — OFFICE VISIT (OUTPATIENT)
Dept: ENDOCRINOLOGY | Facility: CLINIC | Age: 35
End: 2020-12-11

## 2020-12-11 VITALS
SYSTOLIC BLOOD PRESSURE: 112 MMHG | HEART RATE: 87 BPM | HEIGHT: 66 IN | DIASTOLIC BLOOD PRESSURE: 64 MMHG | BODY MASS INDEX: 47.09 KG/M2 | OXYGEN SATURATION: 98 % | WEIGHT: 293 LBS

## 2020-12-11 DIAGNOSIS — O24.419 GESTATIONAL DIABETES MELLITUS (GDM), ANTEPARTUM, GESTATIONAL DIABETES METHOD OF CONTROL UNSPECIFIED: Primary | ICD-10-CM

## 2020-12-11 PROCEDURE — 99212 OFFICE O/P EST SF 10 MIN: CPT | Performed by: INTERNAL MEDICINE

## 2020-12-11 RX ORDER — PEN NEEDLE, DIABETIC 29 G X1/2"
NEEDLE, DISPOSABLE MISCELLANEOUS
COMMUNITY
Start: 2020-11-14 | End: 2020-12-11

## 2020-12-11 RX ORDER — INSULIN DETEMIR 100 [IU]/ML
INJECTION, SOLUTION SUBCUTANEOUS
Qty: 20 ML | Refills: 5 | Status: SHIPPED | OUTPATIENT
Start: 2020-12-11 | End: 2021-01-22 | Stop reason: SDUPTHER

## 2020-12-11 RX ORDER — SYRINGE-NEEDLE,INSULIN,0.5 ML 27GX1/2"
SYRINGE, EMPTY DISPOSABLE MISCELLANEOUS
Qty: 50 EACH | Refills: 5 | Status: SHIPPED | OUTPATIENT
Start: 2020-12-11 | End: 2020-12-15 | Stop reason: SDUPTHER

## 2020-12-11 RX ORDER — INSULIN DETEMIR 100 [IU]/ML
INJECTION, SOLUTION SUBCUTANEOUS
COMMUNITY
Start: 2020-11-17 | End: 2020-12-11

## 2020-12-11 NOTE — PROGRESS NOTES
Chief Complaint   Patient presents with   • Follow-up   • Gestational Diabetes     LANCE 2-        HPI   Maren Ray is a 35 y.o. female had concerns including Follow-up and Gestational Diabetes (LANCE 2-).      Patient reports that she has been doing well in the interim since her last visit.  She is now approximately 30 weeks gestation.  She reports that she saw her OB yesterday and noted no new concerns.  There are no current plans for induction the patient suspects this may occur around 39 weeks.  She was induced with both of her prior pregnancies.  Patient reports that she feels generally well and denies any acute complaints.  Patient reports that baby is moving well.  Patient reports she is working to make sure she remains hydrated and is staying as active as possible.  She denies any recent nausea, vomiting.  She denies any issue with adhering to dietary recommendations.  Patient is currently taking Levemir 30 units at bedtime.  She has been titrating this every other day per prior instructions.  Fasting glucose does remain above goal.  Patient reports that baby was measuring normally at the time of most recent ultrasound and that she believes she will have a repeat ultrasound at one of her  upcoming appointments.    Reviewed glucose data from the week ending 12/6 with the patient:  Fasting glucose ranging , 6-7 values above goal  2 hours after breakfast ranging 105-115  2 hours after lunch ranging   2 hours after dinner ranging     Reviewed glucose data available from thus far this week:  Fasting glucose ranging   2 hours after breakfast ranging   2 hours after lunch ranging   2 hours after dinner ranging     The following portions of the patient's history were reviewed and updated as appropriate: allergies, current medications and past social history.    Review of Systems   Respiratory: Negative for cough and shortness of breath.    Cardiovascular:  "Negative for chest pain and palpitations.   Gastrointestinal: Negative for nausea and vomiting.   Endocrine: Negative for polydipsia and polyuria.        /64   Pulse 87   Ht 167.6 cm (66\")   Wt (!) 151 kg (333 lb 9.6 oz)   SpO2 98%   BMI 53.84 kg/m²      Physical Exam      Constitutional:  well developed; well nourished  no acute distress   ENT/Thyroid: not examined   Eyes: Conjunctiva: clear   Respiratory:  breathing is unlabored  clear to auscultation bilaterally   Cardiovascular:  regular rate and rhythm   Chest:  Not performed.   Abdomen: soft, non-tender; no masses   : Not performed.   Musculoskeletal: Not performed   Skin: dry and warm   Neuro: mental status, speech normal   Psych: mood and affect are within normal limits       Labs/Imaging  Results for AGNES DE JESUS (MRN [id number]) as of 2020 08:40   Ref. Range 2020 14:46   Hemoglobin A1C Latest Ref Range: 4.80 - 5.60 % 5.40       Diagnoses and all orders for this visit:    1. Gestational diabetes mellitus (GDM), antepartum, gestational diabetes method of control unspecified (Primary)  --Diagnosed via screening GCT with value of 201, 3-hour OGTT was not performed  -Patient currently taking Levemir 30 units at bedtime, fasting glucose remains above goal.  Patient instructed to increase Levemir to 34 units tonight.  Reviewed instructions for titration of basal insulin every other day if fasting glucose remains greater than 95.  Prescriptions updated.  -Postprandial glucose values are currently at goal.  -Patient continue monitoring blood glucose fasting and 2 hours postprandial.  She will continue to send this data weekly via [employer].  -Discussed that gestational diabetes can become harder to control as pregnancy progresses. Reviewed need for routine follow up from now until delivery.   -Risks of gestational diabetes were reviewed, these include, but are not limited to: LGA infant, macrosomia, stillbirth,  hypoglycemia, " "hyperbilirubinemia, birth injury,  birth, respiratory complications following delivery, polyhydramnios, hypocalcemia, maternal preeclampsia.  -Glycemic Targets during pregnancy were reviewed, as follows: fasting glucose <95, 1 hour post prandial <140, 2 hour postprandial <120.  -Patient will return for follow up in 4 weeks. She was instructed to contact the office in the interim if glucoses not at target.  -     Insulin Syringe-Needle U-100 30G X 1/2\" 1 ML misc; For use within insulin injections, daily  Dispense: 50 each; Refill: 5  -     Levemir 100 UNIT/ML injection; 34 units daily, titrate per instructions, MDD 50 units  Dispense: 20 mL; Refill: 5      Return in about 4 weeks (around 2021) for Next scheduled follow up. The patient was instructed to contact the clinic with any interval questions or concerns.    Lilia Delgadillo MD   Endocrinologist    Please note that portions of this document were completed with a voice recognition program. Efforts were made to edit the dictations, but occasionally words are mis-transcribed.  "

## 2020-12-15 DIAGNOSIS — O24.419 GESTATIONAL DIABETES MELLITUS (GDM), ANTEPARTUM, GESTATIONAL DIABETES METHOD OF CONTROL UNSPECIFIED: ICD-10-CM

## 2020-12-15 RX ORDER — SYRINGE-NEEDLE,INSULIN,0.5 ML 27GX1/2"
SYRINGE, EMPTY DISPOSABLE MISCELLANEOUS
Qty: 100 EACH | Refills: 5 | Status: SHIPPED | OUTPATIENT
Start: 2020-12-15 | End: 2022-09-21

## 2020-12-15 NOTE — TELEPHONE ENCOUNTER
Received a fax from [facility] pharmacy, requesting new rx for insulin syringe.  Current rx is for quantity of 50 and pharmacy states insulin syringe comes in quantity of 100.      LOV:  12/11/20  Future visit:  01/22/21    Last refill:  12/11/20  Q.  50  R.  5  Sig:  For use within insulin injections, daily    [facility].      Corrected Rx pending.

## 2021-01-22 ENCOUNTER — OFFICE VISIT (OUTPATIENT)
Dept: ENDOCRINOLOGY | Facility: CLINIC | Age: 36
End: 2021-01-22

## 2021-01-22 VITALS
HEIGHT: 66 IN | OXYGEN SATURATION: 99 % | DIASTOLIC BLOOD PRESSURE: 62 MMHG | HEART RATE: 99 BPM | SYSTOLIC BLOOD PRESSURE: 114 MMHG | WEIGHT: 293 LBS | BODY MASS INDEX: 47.09 KG/M2

## 2021-01-22 DIAGNOSIS — O24.419 GESTATIONAL DIABETES MELLITUS (GDM), ANTEPARTUM, GESTATIONAL DIABETES METHOD OF CONTROL UNSPECIFIED: Primary | ICD-10-CM

## 2021-01-22 LAB
EXPIRATION DATE: NORMAL
EXPIRATION DATE: NORMAL
GLUCOSE BLDC GLUCOMTR-MCNC: 104 MG/DL (ref 70–130)
HBA1C MFR BLD: 5.2 %
Lab: NORMAL
Lab: NORMAL

## 2021-01-22 PROCEDURE — 99213 OFFICE O/P EST LOW 20 MIN: CPT | Performed by: INTERNAL MEDICINE

## 2021-01-22 PROCEDURE — 83036 HEMOGLOBIN GLYCOSYLATED A1C: CPT | Performed by: INTERNAL MEDICINE

## 2021-01-22 PROCEDURE — 82947 ASSAY GLUCOSE BLOOD QUANT: CPT | Performed by: INTERNAL MEDICINE

## 2021-01-22 RX ORDER — INSULIN DETEMIR 100 [IU]/ML
INJECTION, SOLUTION SUBCUTANEOUS
Qty: 20 ML | Refills: 5
Start: 2021-01-22 | End: 2021-02-05 | Stop reason: HOSPADM

## 2021-01-22 NOTE — PROGRESS NOTES
"Chief Complaint   Patient presents with   • Follow-up   • Gestational Diabetes        HPI   Maren De Jesus is a 35 y.o. female had concerns including Follow-up and Gestational Diabetes.     Patient is now approximately 37 weeks gestation.  She reports that OB is planning for induction of labor at either 38 or 39 weeks.  She reports that she feels generally well.  She has been titrating her basal insulin at home.  She reports that postprandial glucose elevation is generally attributable to specific dietary choices.  For example, value of 122 yesterday evening following eating pizza.    Glucose data from this week reviewed with the patient  Fasting glucose ranging   2 hours after breakfast ranging   2 hours after lunch ranging , 1 value above goal  2 hours after supper ranging 100-122, 1 value above goal    Patient increase bedtime Levemir to 54 units yesterday evening, urine ketones have been negative.  She reports that she is drinking plenty of water.    The following portions of the patient's history were reviewed and updated as appropriate: allergies, current medications and past social history.    Review of Systems   Gastrointestinal: Negative for nausea and vomiting.   Endocrine: Negative for polydipsia and polyuria.      /62   Pulse 99   Ht 167.6 cm (66\")   Wt (!) 156 kg (343 lb)   SpO2 99%   BMI 55.36 kg/m²      Physical Exam      Constitutional:  well developed; well nourished  no acute distress  appears stated age   ENT/Thyroid: not examined   Eyes: Conjunctiva: clear   Respiratory:  breathing is unlabored  clear to auscultation bilaterally   Cardiovascular:  regular rate and rhythm   Chest:  Not performed.   Abdomen: gravid   : Not performed.   Musculoskeletal: Not performed   Skin: not performed.   Neuro: mental status, speech normal   Psych: mood and affect are within normal limits       Labs/Imaging  Results for MAREN DE JESUS (MRN [id number]) as of 1/22/2021 " 17:32   Ref. Range 2021 08:20 2021 08:21   Glucose Latest Ref Range: 70 - 130 mg/dL 104    Hemoglobin A1C Latest Units: %  5.2       Diagnoses and all orders for this visit:    1. Gestational diabetes mellitus (GDM), antepartum, gestational diabetes method of control unspecified (Primary)  -Currently 37 weeks gestation, OB planning for induction of labor in 1 to 2 weeks  -Patient with fasting blood sugars above goal, to increase Levemir to 56 units tonight, discussed split dosing beyond dose of 60 units, should this be needed.  Patient will continue to titrate until fasting blood sugar at goal.  -Postprandial glucose values generally well controlled.  Patient encouraged to avoid dietary triggers of hyperglycemia.  --Reviewed instructions for glycemic monitoring and the monitoring of urine ketones.  -Patient instructed to send glucose data weekly via [employer].  -Risks of gestational diabetes were reviewed, these include, but are not limited to: LGA infant, macrosomia, stillbirth,  hypoglycemia, hyperbilirubinemia, birth injury,  birth, respiratory complications following delivery, polyhydramnios, hypocalcemia, maternal preeclampsia.  -Nutritional goals reviewed  -Glycemic Targets during pregnancy were reviewed, as follows: fasting glucose <95, 1 hour post prandial <140, 2 hour postprandial <120.  -Discussed with patient that she should reduce basal insulin by 50% evening prior to induction.  -Discussed with patient that she will discontinue all insulin following delivery.  -Encourage patient to monitor blood sugar a few times per week following delivery or if symptomatic given high current insulin use.  -Reviewed that gestational diabetes is a risk factor for development of type 2 diabetes later in life.    -     POC Glucose, Blood  -     POC Glycosylated Hemoglobin (Hb A1C)    Return in about 4 months (around 2021) for Next scheduled follow up. The patient was instructed to contact the  clinic with any interval questions or concerns.    Lilia Delgadillo MD   Endocrinologist    Please note that portions of this document were completed with a voice recognition program. Efforts were made to edit the dictations, but occasionally words are mis-transcribed.

## 2021-02-02 ENCOUNTER — HOSPITAL ENCOUNTER (INPATIENT)
Facility: HOSPITAL | Age: 36
LOS: 3 days | Discharge: HOME | End: 2021-02-05
Attending: NURSE PRACTITIONER | Admitting: OBSTETRICS & GYNECOLOGY

## 2021-02-02 PROBLEM — O24.419 GESTATIONAL DIABETES MELLITUS, CLASS A2: Status: ACTIVE | Noted: 2021-02-02

## 2021-02-02 LAB
ABO GROUP BLD: NORMAL
ALP SERPL-CCNC: 145 U/L (ref 39–117)
ALT SERPL W P-5'-P-CCNC: 8 U/L (ref 1–33)
AST SERPL-CCNC: 13 U/L (ref 1–32)
BASOPHILS # BLD AUTO: 0.02 10*3/MM3 (ref 0–0.2)
BASOPHILS NFR BLD AUTO: 0.2 % (ref 0–1.5)
BILIRUB SERPL-MCNC: 0.2 MG/DL (ref 0–1.2)
BLD GP AB SCN SERPL QL: NEGATIVE
CREAT SERPL-MCNC: 0.61 MG/DL (ref 0.57–1)
DEPRECATED RDW RBC AUTO: 47.2 FL (ref 37–54)
EOSINOPHIL # BLD AUTO: 0.08 10*3/MM3 (ref 0–0.4)
EOSINOPHIL NFR BLD AUTO: 0.7 % (ref 0.3–6.2)
ERYTHROCYTE [DISTWIDTH] IN BLOOD BY AUTOMATED COUNT: 13.9 % (ref 12.3–15.4)
GLUCOSE BLDC GLUCOMTR-MCNC: 132 MG/DL (ref 70–130)
HCT VFR BLD AUTO: 37.8 % (ref 34–46.6)
HGB BLD-MCNC: 12.2 G/DL (ref 12–15.9)
IMM GRANULOCYTES # BLD AUTO: 0.11 10*3/MM3 (ref 0–0.05)
IMM GRANULOCYTES NFR BLD AUTO: 1 % (ref 0–0.5)
LDH SERPL-CCNC: 163 U/L (ref 135–214)
LYMPHOCYTES # BLD AUTO: 1.69 10*3/MM3 (ref 0.7–3.1)
LYMPHOCYTES NFR BLD AUTO: 14.6 % (ref 19.6–45.3)
MCH RBC QN AUTO: 29.8 PG (ref 26.6–33)
MCHC RBC AUTO-ENTMCNC: 32.3 G/DL (ref 31.5–35.7)
MCV RBC AUTO: 92.4 FL (ref 79–97)
MONOCYTES # BLD AUTO: 0.65 10*3/MM3 (ref 0.1–0.9)
MONOCYTES NFR BLD AUTO: 5.6 % (ref 5–12)
NEUTROPHILS NFR BLD AUTO: 77.9 % (ref 42.7–76)
NEUTROPHILS NFR BLD AUTO: 9.01 10*3/MM3 (ref 1.7–7)
NRBC BLD AUTO-RTO: 0 /100 WBC (ref 0–0.2)
PLATELET # BLD AUTO: 304 10*3/MM3 (ref 140–450)
PMV BLD AUTO: 10 FL (ref 6–12)
RBC # BLD AUTO: 4.09 10*6/MM3 (ref 3.77–5.28)
RH BLD: POSITIVE
SARS-COV-2 RDRP RESP QL NAA+PROBE: NORMAL
T&S EXPIRATION DATE: NORMAL
URATE SERPL-MCNC: 3.8 MG/DL (ref 2.4–5.7)
WBC # BLD AUTO: 11.56 10*3/MM3 (ref 3.4–10.8)

## 2021-02-02 PROCEDURE — 86850 RBC ANTIBODY SCREEN: CPT | Performed by: OBSTETRICS & GYNECOLOGY

## 2021-02-02 PROCEDURE — 3E033VJ INTRODUCTION OF OTHER HORMONE INTO PERIPHERAL VEIN, PERCUTANEOUS APPROACH: ICD-10-PCS | Performed by: NURSE PRACTITIONER

## 2021-02-02 PROCEDURE — 84460 ALANINE AMINO (ALT) (SGPT): CPT | Performed by: OBSTETRICS & GYNECOLOGY

## 2021-02-02 PROCEDURE — 36415 COLL VENOUS BLD VENIPUNCTURE: CPT | Performed by: OBSTETRICS & GYNECOLOGY

## 2021-02-02 PROCEDURE — 82247 BILIRUBIN TOTAL: CPT | Performed by: OBSTETRICS & GYNECOLOGY

## 2021-02-02 PROCEDURE — 84450 TRANSFERASE (AST) (SGOT): CPT | Performed by: OBSTETRICS & GYNECOLOGY

## 2021-02-02 PROCEDURE — 85025 COMPLETE CBC W/AUTO DIFF WBC: CPT | Performed by: OBSTETRICS & GYNECOLOGY

## 2021-02-02 PROCEDURE — 86900 BLOOD TYPING SEROLOGIC ABO: CPT | Performed by: OBSTETRICS & GYNECOLOGY

## 2021-02-02 PROCEDURE — 82962 GLUCOSE BLOOD TEST: CPT

## 2021-02-02 PROCEDURE — 84550 ASSAY OF BLOOD/URIC ACID: CPT | Performed by: OBSTETRICS & GYNECOLOGY

## 2021-02-02 PROCEDURE — 87635 SARS-COV-2 COVID-19 AMP PRB: CPT | Performed by: OBSTETRICS & GYNECOLOGY

## 2021-02-02 PROCEDURE — 83615 LACTATE (LD) (LDH) ENZYME: CPT | Performed by: OBSTETRICS & GYNECOLOGY

## 2021-02-02 PROCEDURE — 82565 ASSAY OF CREATININE: CPT | Performed by: OBSTETRICS & GYNECOLOGY

## 2021-02-02 PROCEDURE — 84075 ASSAY ALKALINE PHOSPHATASE: CPT | Performed by: OBSTETRICS & GYNECOLOGY

## 2021-02-02 PROCEDURE — 86901 BLOOD TYPING SEROLOGIC RH(D): CPT | Performed by: OBSTETRICS & GYNECOLOGY

## 2021-02-02 RX ORDER — BUTORPHANOL TARTRATE 1 MG/ML
1 INJECTION, SOLUTION INTRAMUSCULAR; INTRAVENOUS
Status: DISCONTINUED | OUTPATIENT
Start: 2021-02-02 | End: 2021-02-03 | Stop reason: HOSPADM

## 2021-02-02 RX ORDER — IBUPROFEN 600 MG/1
600 TABLET ORAL EVERY 6 HOURS PRN
Status: DISCONTINUED | OUTPATIENT
Start: 2021-02-02 | End: 2021-02-03 | Stop reason: HOSPADM

## 2021-02-02 RX ORDER — SODIUM CHLORIDE 0.9 % (FLUSH) 0.9 %
1-10 SYRINGE (ML) INJECTION AS NEEDED
Status: DISCONTINUED | OUTPATIENT
Start: 2021-02-02 | End: 2021-02-03 | Stop reason: HOSPADM

## 2021-02-02 RX ORDER — CARBOPROST TROMETHAMINE 250 UG/ML
250 INJECTION, SOLUTION INTRAMUSCULAR AS NEEDED
Status: DISCONTINUED | OUTPATIENT
Start: 2021-02-02 | End: 2021-02-03 | Stop reason: HOSPADM

## 2021-02-02 RX ORDER — LIDOCAINE HYDROCHLORIDE 10 MG/ML
5 INJECTION, SOLUTION EPIDURAL; INFILTRATION; INTRACAUDAL; PERINEURAL AS NEEDED
Status: DISCONTINUED | OUTPATIENT
Start: 2021-02-02 | End: 2021-02-03 | Stop reason: HOSPADM

## 2021-02-02 RX ORDER — MAGNESIUM CARB/ALUMINUM HYDROX 105-160MG
30 TABLET,CHEWABLE ORAL ONCE
Status: DISCONTINUED | OUTPATIENT
Start: 2021-02-02 | End: 2021-02-03 | Stop reason: HOSPADM

## 2021-02-02 RX ORDER — SODIUM CHLORIDE 0.9 % (FLUSH) 0.9 %
3 SYRINGE (ML) INJECTION EVERY 12 HOURS SCHEDULED
Status: DISCONTINUED | OUTPATIENT
Start: 2021-02-02 | End: 2021-02-03 | Stop reason: HOSPADM

## 2021-02-02 RX ORDER — OXYTOCIN-SODIUM CHLORIDE 0.9% IV SOLN 30 UNIT/500ML 30-0.9/5 UT/ML-%
85 SOLUTION INTRAVENOUS ONCE
Status: COMPLETED | OUTPATIENT
Start: 2021-02-02 | End: 2021-02-03

## 2021-02-02 RX ORDER — OXYTOCIN-SODIUM CHLORIDE 0.9% IV SOLN 30 UNIT/500ML 30-0.9/5 UT/ML-%
2-30 SOLUTION INTRAVENOUS
Status: DISCONTINUED | OUTPATIENT
Start: 2021-02-02 | End: 2021-02-03 | Stop reason: HOSPADM

## 2021-02-02 RX ORDER — SODIUM CHLORIDE, SODIUM LACTATE, POTASSIUM CHLORIDE, CALCIUM CHLORIDE 600; 310; 30; 20 MG/100ML; MG/100ML; MG/100ML; MG/100ML
125 INJECTION, SOLUTION INTRAVENOUS CONTINUOUS
Status: DISCONTINUED | OUTPATIENT
Start: 2021-02-02 | End: 2021-02-05

## 2021-02-02 RX ORDER — MISOPROSTOL 200 UG/1
800 TABLET ORAL AS NEEDED
Status: DISCONTINUED | OUTPATIENT
Start: 2021-02-02 | End: 2021-02-03 | Stop reason: HOSPADM

## 2021-02-02 RX ORDER — METHYLERGONOVINE MALEATE 0.2 MG/ML
200 INJECTION INTRAVENOUS ONCE AS NEEDED
Status: DISCONTINUED | OUTPATIENT
Start: 2021-02-02 | End: 2021-02-03 | Stop reason: HOSPADM

## 2021-02-02 RX ORDER — OXYTOCIN-SODIUM CHLORIDE 0.9% IV SOLN 30 UNIT/500ML 30-0.9/5 UT/ML-%
650 SOLUTION INTRAVENOUS ONCE
Status: COMPLETED | OUTPATIENT
Start: 2021-02-02 | End: 2021-02-03

## 2021-02-02 RX ADMIN — SODIUM CHLORIDE, POTASSIUM CHLORIDE, SODIUM LACTATE AND CALCIUM CHLORIDE 125 ML/HR: 600; 310; 30; 20 INJECTION, SOLUTION INTRAVENOUS at 20:30

## 2021-02-02 RX ADMIN — OXYTOCIN 2 MILLI-UNITS/MIN: 10 INJECTION INTRAVENOUS at 22:27

## 2021-02-03 ENCOUNTER — ANESTHESIA EVENT (OUTPATIENT)
Dept: LABOR AND DELIVERY | Facility: HOSPITAL | Age: 36
End: 2021-02-03

## 2021-02-03 ENCOUNTER — ANESTHESIA (OUTPATIENT)
Dept: LABOR AND DELIVERY | Facility: HOSPITAL | Age: 36
End: 2021-02-03

## 2021-02-03 LAB
ATMOSPHERIC PRESS: ABNORMAL MM[HG]
ATMOSPHERIC PRESS: ABNORMAL MM[HG]
BASE EXCESS BLDCOA CALC-SCNC: -5.5 MMOL/L (ref 0–2)
BASE EXCESS BLDCOV CALC-SCNC: -1.9 MMOL/L (ref 0–2)
BDY SITE: ABNORMAL
BDY SITE: ABNORMAL
BODY TEMPERATURE: 37 C
BODY TEMPERATURE: 37 C
CO2 BLDA-SCNC: 27.4 MMOL/L (ref 22–33)
CO2 BLDA-SCNC: 27.8 MMOL/L (ref 22–33)
COLLECT TME SMN: ABNORMAL
EPAP: 0
EPAP: 0
GLUCOSE BLDC GLUCOMTR-MCNC: 104 MG/DL (ref 70–130)
GLUCOSE BLDC GLUCOMTR-MCNC: 106 MG/DL (ref 70–130)
GLUCOSE BLDC GLUCOMTR-MCNC: 108 MG/DL (ref 70–130)
GLUCOSE BLDC GLUCOMTR-MCNC: 111 MG/DL (ref 70–130)
GLUCOSE BLDC GLUCOMTR-MCNC: 121 MG/DL (ref 70–130)
GLUCOSE BLDC GLUCOMTR-MCNC: 74 MG/DL (ref 70–130)
HCO3 BLDCOA-SCNC: 25.6 MMOL/L (ref 16.9–20.5)
HCO3 BLDCOV-SCNC: 25.8 MMOL/L (ref 18.6–21.4)
HGB BLDA-MCNC: 16.4 G/DL (ref 14–18)
HGB BLDA-MCNC: 16.6 G/DL (ref 14–18)
INHALED O2 CONCENTRATION: 21 %
INHALED O2 CONCENTRATION: 21 %
IPAP: 0
IPAP: 0
Lab: ABNORMAL
MODALITY: ABNORMAL
MODALITY: ABNORMAL
NOTE: ABNORMAL
NOTE: ABNORMAL
NOTIFIED BY: ABNORMAL
NOTIFIED WHO: ABNORMAL
PAW @ PEAK INSP FLOW SETTING VENT: 0 CMH2O
PAW @ PEAK INSP FLOW SETTING VENT: 0 CMH2O
PCO2 BLDCOA: 73.8 MMHG (ref 43.3–54.9)
PCO2 BLDCOV: 53.5 MM HG
PH BLDCOA: 7.15 PH UNITS (ref 7.22–7.3)
PH BLDCOV: 7.29 PH UNITS
PO2 BLDCOA: 17 MMHG (ref 11.5–43.3)
PO2 BLDCOV: 18.4 MM HG
SAO2 % BLDCOA: 22.9 %
SAO2 % BLDCOA: ABNORMAL %
SAO2 % BLDCOV: 33.5 %
TOTAL RATE: 0 BREATHS/MINUTE
TOTAL RATE: 0 BREATHS/MINUTE

## 2021-02-03 PROCEDURE — 59200 INSERT CERVICAL DILATOR: CPT | Performed by: OBSTETRICS & GYNECOLOGY

## 2021-02-03 PROCEDURE — 82962 GLUCOSE BLOOD TEST: CPT

## 2021-02-03 PROCEDURE — 25010000002 FENTANYL CITRATE (PF) 100 MCG/2ML SOLUTION: Performed by: NURSE ANESTHETIST, CERTIFIED REGISTERED

## 2021-02-03 PROCEDURE — 0KQM0ZZ REPAIR PERINEUM MUSCLE, OPEN APPROACH: ICD-10-PCS | Performed by: NURSE PRACTITIONER

## 2021-02-03 PROCEDURE — 88307 TISSUE EXAM BY PATHOLOGIST: CPT | Performed by: NURSE PRACTITIONER

## 2021-02-03 PROCEDURE — 82805 BLOOD GASES W/O2 SATURATION: CPT

## 2021-02-03 PROCEDURE — 51703 INSERT BLADDER CATH COMPLEX: CPT

## 2021-02-03 PROCEDURE — 25010000002 ROPIVACAINE PER 1 MG: Performed by: NURSE ANESTHETIST, CERTIFIED REGISTERED

## 2021-02-03 PROCEDURE — C1755 CATHETER, INTRASPINAL: HCPCS

## 2021-02-03 PROCEDURE — C1755 CATHETER, INTRASPINAL: HCPCS | Performed by: ANESTHESIOLOGY

## 2021-02-03 PROCEDURE — 59025 FETAL NON-STRESS TEST: CPT

## 2021-02-03 PROCEDURE — 25010000002 BUTORPHANOL PER 1 MG: Performed by: ADVANCED PRACTICE MIDWIFE

## 2021-02-03 RX ORDER — IBUPROFEN 600 MG/1
600 TABLET ORAL EVERY 6 HOURS PRN
Status: DISCONTINUED | OUTPATIENT
Start: 2021-02-03 | End: 2021-02-05 | Stop reason: HOSPADM

## 2021-02-03 RX ORDER — DOCUSATE SODIUM 100 MG/1
100 CAPSULE, LIQUID FILLED ORAL 2 TIMES DAILY
Status: DISCONTINUED | OUTPATIENT
Start: 2021-02-03 | End: 2021-02-05 | Stop reason: HOSPADM

## 2021-02-03 RX ORDER — BISACODYL 10 MG
10 SUPPOSITORY, RECTAL RECTAL DAILY PRN
Status: DISCONTINUED | OUTPATIENT
Start: 2021-02-04 | End: 2021-02-05 | Stop reason: HOSPADM

## 2021-02-03 RX ORDER — EPHEDRINE SULFATE 5 MG/ML
5 INJECTION INTRAVENOUS
Status: DISCONTINUED | OUTPATIENT
Start: 2021-02-03 | End: 2021-02-03 | Stop reason: HOSPADM

## 2021-02-03 RX ORDER — ONDANSETRON 2 MG/ML
4 INJECTION INTRAMUSCULAR; INTRAVENOUS EVERY 6 HOURS PRN
Status: DISCONTINUED | OUTPATIENT
Start: 2021-02-03 | End: 2021-02-05 | Stop reason: HOSPADM

## 2021-02-03 RX ORDER — ROPIVACAINE HYDROCHLORIDE 2 MG/ML
15 INJECTION, SOLUTION EPIDURAL; INFILTRATION; PERINEURAL CONTINUOUS
Status: DISCONTINUED | OUTPATIENT
Start: 2021-02-03 | End: 2021-02-05

## 2021-02-03 RX ORDER — LANOLIN
CREAM (ML) TOPICAL
Status: DISCONTINUED | OUTPATIENT
Start: 2021-02-03 | End: 2021-02-05 | Stop reason: HOSPADM

## 2021-02-03 RX ORDER — DIPHENHYDRAMINE HYDROCHLORIDE 50 MG/ML
12.5 INJECTION INTRAMUSCULAR; INTRAVENOUS EVERY 8 HOURS PRN
Status: DISCONTINUED | OUTPATIENT
Start: 2021-02-03 | End: 2021-02-03 | Stop reason: HOSPADM

## 2021-02-03 RX ORDER — METOCLOPRAMIDE HYDROCHLORIDE 5 MG/ML
10 INJECTION INTRAMUSCULAR; INTRAVENOUS ONCE AS NEEDED
Status: DISCONTINUED | OUTPATIENT
Start: 2021-02-03 | End: 2021-02-03 | Stop reason: HOSPADM

## 2021-02-03 RX ORDER — HYDROCODONE BITARTRATE AND ACETAMINOPHEN 5; 325 MG/1; MG/1
1 TABLET ORAL EVERY 4 HOURS PRN
Status: DISCONTINUED | OUTPATIENT
Start: 2021-02-03 | End: 2021-02-05 | Stop reason: HOSPADM

## 2021-02-03 RX ORDER — LIDOCAINE HYDROCHLORIDE AND EPINEPHRINE 15; 5 MG/ML; UG/ML
INJECTION, SOLUTION EPIDURAL AS NEEDED
Status: DISCONTINUED | OUTPATIENT
Start: 2021-02-03 | End: 2021-02-03 | Stop reason: SURG

## 2021-02-03 RX ORDER — HYDROCORTISONE 25 MG/G
1 CREAM TOPICAL AS NEEDED
Status: DISCONTINUED | OUTPATIENT
Start: 2021-02-03 | End: 2021-02-05 | Stop reason: HOSPADM

## 2021-02-03 RX ORDER — SODIUM CHLORIDE 0.9 % (FLUSH) 0.9 %
1-10 SYRINGE (ML) INJECTION AS NEEDED
Status: DISCONTINUED | OUTPATIENT
Start: 2021-02-03 | End: 2021-02-05 | Stop reason: HOSPADM

## 2021-02-03 RX ORDER — ONDANSETRON 2 MG/ML
4 INJECTION INTRAMUSCULAR; INTRAVENOUS ONCE AS NEEDED
Status: DISCONTINUED | OUTPATIENT
Start: 2021-02-03 | End: 2021-02-03 | Stop reason: HOSPADM

## 2021-02-03 RX ORDER — FENTANYL CITRATE 50 UG/ML
INJECTION, SOLUTION INTRAMUSCULAR; INTRAVENOUS AS NEEDED
Status: DISCONTINUED | OUTPATIENT
Start: 2021-02-03 | End: 2021-02-03 | Stop reason: SURG

## 2021-02-03 RX ORDER — TRISODIUM CITRATE DIHYDRATE AND CITRIC ACID MONOHYDRATE 500; 334 MG/5ML; MG/5ML
30 SOLUTION ORAL ONCE
Status: DISCONTINUED | OUTPATIENT
Start: 2021-02-03 | End: 2021-02-03 | Stop reason: HOSPADM

## 2021-02-03 RX ADMIN — ROPIVACAINE HYDROCHLORIDE 10 ML: 5 INJECTION, SOLUTION EPIDURAL; INFILTRATION; PERINEURAL at 14:26

## 2021-02-03 RX ADMIN — OXYTOCIN 85 ML/HR: 10 INJECTION INTRAVENOUS at 20:30

## 2021-02-03 RX ADMIN — BUTORPHANOL TARTRATE 2 MG: 2 INJECTION, SOLUTION INTRAMUSCULAR; INTRAVENOUS at 12:31

## 2021-02-03 RX ADMIN — Medication: at 22:40

## 2021-02-03 RX ADMIN — FENTANYL CITRATE 100 MCG: 50 INJECTION, SOLUTION INTRAMUSCULAR; INTRAVENOUS at 14:24

## 2021-02-03 RX ADMIN — IBUPROFEN 600 MG: 600 TABLET, FILM COATED ORAL at 22:40

## 2021-02-03 RX ADMIN — DOCUSATE SODIUM 100 MG: 100 CAPSULE, LIQUID FILLED ORAL at 22:40

## 2021-02-03 RX ADMIN — LIDOCAINE HYDROCHLORIDE AND EPINEPHRINE 3 ML: 15; 5 INJECTION, SOLUTION EPIDURAL at 14:22

## 2021-02-03 RX ADMIN — WITCH HAZEL 1 PAD: 500 SOLUTION RECTAL; TOPICAL at 22:40

## 2021-02-03 RX ADMIN — LIDOCAINE HYDROCHLORIDE AND EPINEPHRINE 1 ML: 15; 5 INJECTION, SOLUTION EPIDURAL at 14:24

## 2021-02-03 RX ADMIN — BUTORPHANOL TARTRATE 2 MG: 2 INJECTION, SOLUTION INTRAMUSCULAR; INTRAVENOUS at 09:59

## 2021-02-03 RX ADMIN — SODIUM CHLORIDE, POTASSIUM CHLORIDE, SODIUM LACTATE AND CALCIUM CHLORIDE 125 ML/HR: 600; 310; 30; 20 INJECTION, SOLUTION INTRAVENOUS at 17:00

## 2021-02-03 RX ADMIN — BUTORPHANOL TARTRATE 2 MG: 2 INJECTION, SOLUTION INTRAMUSCULAR; INTRAVENOUS at 02:30

## 2021-02-03 RX ADMIN — ROPIVACAINE HYDROCHLORIDE 15 ML/HR: 2 INJECTION, SOLUTION EPIDURAL; INFILTRATION at 14:29

## 2021-02-03 RX ADMIN — BUTORPHANOL TARTRATE 2 MG: 2 INJECTION, SOLUTION INTRAMUSCULAR; INTRAVENOUS at 07:17

## 2021-02-03 RX ADMIN — OXYTOCIN 650 ML/HR: 10 INJECTION INTRAVENOUS at 19:57

## 2021-02-03 NOTE — ANESTHESIA PREPROCEDURE EVALUATION
Anesthesia Evaluation     Patient summary reviewed and Nursing notes reviewed   NPO Solid Status: > 6 hours  NPO Liquid Status: > 6 hours           Airway   Dental      Pulmonary    (+) sleep apnea,   Cardiovascular - negative cardio ROS        Neuro/Psych  (+) headaches, numbness (Bilateral carpal tunnel syndrome), psychiatric history Anxiety and Depression,     GI/Hepatic/Renal/Endo    (+) obesity, morbid obesity, GERD,  diabetes mellitus gestational,     Musculoskeletal (-) negative ROS    Abdominal    Substance History - negative use     OB/GYN    (+) Pregnant,         Other                        Anesthesia Plan    ASA 3     epidural       Anesthetic plan, all risks, benefits, and alternatives have been provided, discussed and informed consent has been obtained with: patient.

## 2021-02-03 NOTE — ANESTHESIA PROCEDURE NOTES
Labor Epidural      Patient reassessed immediately prior to procedure    Patient location during procedure: OB  Performed By  CRNA: Linda Garcia CRNA  Preanesthetic Checklist  Completed: patient identified, surgical consent, pre-op evaluation, timeout performed, IV checked, risks and benefits discussed and monitors and equipment checked  Prep:  Pt Position:sitting  Sterile Tech:cap, gloves, mask and sterile barrier  Prep:DuraPrep  Monitoring:blood pressure monitoring  Epidural Block Procedure:  Approach:midline  Guidance:landmark technique and palpation technique  Location:L4-L5  Needle Type:Tuohy  Needle Gauge:17 G  Loss of Resistance Medium: saline  Loss of Resistance: 8cm  Cath Depth at skin:15 cm  Paresthesia: none  Aspiration:negative  Test Dose:negative  Number of Attempts: 1  Post Assessment:  Dressing:occlusive dressing applied and secured with tape  Pt Tolerance:patient tolerated the procedure well with no apparent complications  Complications:no

## 2021-02-03 NOTE — PROCEDURES
Transcervical alexandre placed per physician request.  FHT's class 1.  Patient tolerated well. 24 [place], 60ml.    Noah Orosco MD  2/3/2021  01:15 EST

## 2021-02-03 NOTE — PROGRESS NOTES
[facility]  Obstetric Progress Note    Subjective     Patient:    Resting without complaints    Objective     Vital Signs Range for the last 24 hours  Temp:  [98 °F (36.7 °C)-98.3 °F (36.8 °C)] 98.2 °F (36.8 °C)   Temp src: Oral   BP: ()/(53-66) 114/65   Heart Rate:  [77-96] 82   Resp:  [14-18] 15               Weight:  [156 kg (343 lb)] 156 kg (343 lb)       Intake/Output this shift:    I/O this shift:  In: 36.9 [I.V.:36.9]  Out: -     Physical Exam:      Abdomen Abdominal exam: soft, nontender, nondistended, no masses or organomegaly.   Extremities Exam of extremities: peripheral pulses normal, no pedal edema, no clubbing or cyanosis     Presentation: vertex   Cervix: Exam by: JESSICA   Dilation:   5-6   Effacement: 80%   Station:   -1         Fetal Heart Rate Assessment   Method: Fetal HR Assessment Method: external   Beats/min: Fetal HR (beats/min): 125   Baseline: Fetal Heart Baseline Rate: normal range   Varibility: Fetal HR Variability: moderate (amplitude range 6 to 25 bpm)   Accels: Fetal HR Accelerations: absent   Decels: Fetal HR Decelerations: absent   Tracing Category:       Uterine Assessment   Method: Method: external tocotransducer   Frequency (min): Contraction Frequency (Minutes): 2-3   Ctx Count in 10 min:     Duration:     Intensity: Contraction Intensity: moderate by palpation   Intensity by IUPC:     Resting Tone: Uterine Resting Tone: soft by palpation   Resting Tone by IUPC:     [place] Units:         Assessment/Plan       Gestational diabetes mellitus, class A2        Assessment:  1.  Intrauterine pregnancy at 38w2d weeks gestation with reactive fetal status.    2.  IOL for GDM, uncontrolled on insulin    Plan:  1. Continue observation. IUPC placed  2.  Repeat SVE every 2-4 hours or prn  3.   Plan of care has been reviewed with patient and she verbalizes understanding  4.  Risks, benefits of treatment plan have been discussed.  5.  All questions have been answered.  6.  Epidural as  suzannas      Treva Clemens CNM  2/3/2021  13:16 EST

## 2021-02-03 NOTE — PROGRESS NOTES
[facility]  Obstetric Progress Note    Late Entry for 0930    Subjective     Patient:    Resting without complaints    Objective     Vital Signs Range for the last 24 hours  Temp:  [98 °F (36.7 °C)-98.3 °F (36.8 °C)] 98.2 °F (36.8 °C)   Temp src: Oral   BP: ()/(53-66) 114/65   Heart Rate:  [77-96] 82   Resp:  [14-18] 15               Weight:  [156 kg (343 lb)] 156 kg (343 lb)       Intake/Output this shift:    I/O this shift:  In: 36.9 [I.V.:36.9]  Out: -     Physical Exam:      Abdomen Abdominal exam: soft, nontender, nondistended, no masses or organomegaly.   Extremities Exam of extremities: peripheral pulses normal, no pedal edema, no clubbing or cyanosis     Presentation: vertex   Cervix: Exam by: Method: sterile exam per CNM   Dilation:   2-3   Effacement: Cervical Effacement: 30%   Station:   alexandre bulb in place         Fetal Heart Rate Assessment   Method: Fetal HR Assessment Method: external   Beats/min: Fetal HR (beats/min): 125   Baseline: Fetal Heart Baseline Rate: normal range   Varibility: Fetal HR Variability: minimal (detectable, amplitude less than or equal to 5 bpm)   Accels: Fetal HR Accelerations: absent   Decels: Fetal HR Decelerations: variable   Tracing Category:       Uterine Assessment   Method: Method: external tocotransducer   Frequency (min): Contraction Frequency (Minutes): 2-5   Ctx Count in 10 min:     Duration:     Intensity: Contraction Intensity: moderate by palpation   Intensity by IUPC:     Resting Tone: Uterine Resting Tone: soft by palpation   Resting Tone by IUPC:     [place] Units:         Assessment/Plan       Gestational diabetes mellitus, class A2        Assessment:  1.  Intrauterine pregnancy at 38w2d weeks gestation with reactive fetal status.    2.  IOL for uncontrolled gestational diabetes    Plan:  1. Continue observation.   2.  Repeat SVE every 2-4 hours or prn, will attempt alexandre bulb removal at next SVE  3.   Plan of care has been reviewed with patient and  she verbalizes understanding  4.  Risks, benefits of treatment plan have been discussed.  5.  All questions have been answered.  6.  Epidural as desires      Treva Clemens CNM  2/3/2021  12:08 EST

## 2021-02-03 NOTE — H&P
[facility]  Obstetric History and Physical    Chief Complaint   Patient presents with   • Scheduled Induction       Subjective     Patient is a 35 y.o. female  currently at 38w2d, who presents for induction of labor secondary to uncontrolled gestational diabetes on insulin.  Fasting blood sugars have remained elevated despite increasing insulin.    Her prenatal care is otherwise benign.  Past medical history is noted for maternal obesity. Her previous obstetric/gynecological history is noted for is non-contributory.    The following portions of the patients history were reviewed and updated as appropriate: current medications, allergies, past medical history, past surgical history, past family history, past social history and problem list .       Prenatal Information:  Prenatal Results     POC Urine Glucose/Protein     Test Value Reference Range Date Time    Urine Glucose Negative mg/dL Negative, 1000 mg/dL (3+) 18    Urine Protein Negative mg/dL Negative 18 0949          Initial Prenatal Labs     Test Value Reference Range Date Time    Hemoglobin 12.9 g/dL 12.0 - 15.9 20 1446    Hematocrit 37.9 % 34.0 - 46.6 20 1446    Platelets 304 10*3/mm3 140 - 450 21      297 10*3/mm3 140 - 450 10/23/20 0957      291 10*3/mm3 140 - 450 20 1446    Rubella IgG Positive   20 1446    Hepatitis B SAg Non-Reactive  Non-Reactive 20 1446    Hepatitis C Ab Non-Reactive  Non-Reactive 20 1446    RPR Non-Reactive  Non-Reactive 20 1446    ABO O   21    Rh Positive   21    Antibody Screen Negative   20 1446    HIV Non-Reactive  Non-Reactive 20 1446    Urine Culture        Gonorrhea        Chlamydia        TSH 1.310 uIU/mL 0.270 - 4.200 20 1446          2nd and 3rd Trimester     Test Value Reference Range Date Time    Hemoglobin (repeated) 12.2 g/dL 12.0 - 15.9 21      12.1 g/dL 12.0 - 15.9 10/23/20 0957     Hematocrit (repeated) 37.8 % 34.0 - 46.6 02/02/21 2032      37.2 % 34.0 - 46.6 10/23/20 0957     mg/dL 65 - 140 10/23/20 0957    Antibody Screen (repeated) Negative   02/02/21 2032      Negative   10/23/20 0957    GTT Fasting        GTT 1 Hr        GTT 2 Hr        GTT 3 Hr        Group B Strep              Drug Screening     Test Value Reference Range Date Time    Amphetamine Screen Negative  Negative 08/20/20 1446    Barbiturate Screen Negative  Negative 08/20/20 1446    Benzodiazepine Screen Negative  Negative 08/20/20 1446    Methadone Screen Negative  Negative 08/20/20 1446    Phencyclidine Screen Negative  Negative 08/20/20 1446    Opiates Screen Negative  Negative 08/20/20 1446    THC Screen Negative  Negative 08/20/20 1446    Cocaine Screen Negative  Negative 08/20/20 1446    Propoxyphene Screen Negative  Negative 08/20/20 1446    Buprenorphine Screen Negative  Negative 08/20/20 1446    Methamphetamine Screen Negative  Negative 08/20/20 1446    Oxycodone Screen Negative  Negative 08/20/20 1446    Tricyclic Antidepressants Screen Negative  Negative 08/20/20 1446          Other (Risk screening)     Test Value Reference Range Date Time    Varicella IgG        Parvovirus IgG        CMV IgG        Cystic Fibrosis        Hemoglobin electrophoresis        NIPT        MSAFP-4        AFP (for NTD only)                  External Prenatal Results     Pregnancy Outside Results - Transcribed From Office Records - See Scanned Records For Details     Test Value Date Time    Hgb 12.2 g/dL 02/02/21 2032      12.1 g/dL 10/23/20 0957      12.9 g/dL 08/20/20 1446    Hct 37.8 % 02/02/21 2032      37.2 % 10/23/20 0957      37.9 % 08/20/20 1446    ABO O  02/02/21 2032    Rh Positive  02/02/21 2032    Antibody Screen Negative  02/02/21 2032      Negative  10/23/20 0957      Negative  08/20/20 1446    Glucose Fasting GTT       Glucose Tolerance Test 1 hour       Glucose Tolerance Test 3 hour       Gonorrhea (discrete)        Chlamydia (discrete)       RPR Non-Reactive  20 1446    VDRL       Syphilis Antibody       Rubella Positive  20 1446    HBsAg Non-Reactive  20 1446    Herpes Simplex Virus PCR       Herpes Simplex VIrus Culture       HIV Non-Reactive  20 1446    Hep C RNA Quant PCR       Hep C Antibody Non-Reactive  20 1446    AFP       Group B Strep       GBS Susceptibility to Clindamycin       GBS Susceptibility to Erythromycin       Fetal Fibronectin       Genetic Testing, Maternal Blood             Drug Screening     Test Value Date Time    Urine Drug Screen       Amphetamine Screen Negative  20 1446    Barbiturate Screen Negative  20 1446    Benzodiazepine Screen Negative  20 1446    Methadone Screen Negative  20 1446    Phencyclidine Screen Negative  20 1446    Opiates Screen Negative  20 1446    THC Screen Negative  20 1446    Cocaine Screen       Propoxyphene Screen Negative  20 1446    Buprenorphine Screen Negative  20 1446    Methamphetamine Screen       Oxycodone Screen Negative  20 1446    Tricyclic Antidepressants Screen Negative  20 1446                 Past OB History:     OB History    Para Term  AB Living   3 2 2 0 0 2   SAB TAB Ectopic Molar Multiple Live Births   0 0 0 0 0 2      # Outcome Date GA Lbr Inocente/2nd Weight Sex Delivery Anes PTL Lv   3 Current            2 Term 17 38w5d / 00:50 3665 g (8 lb 1.3 oz) M Vag-Spont EPI N ARIANNA      Name: YARA DE JESUS      Apgar1: 8  Apgar5: 9   1 Term 13 42w0d  4224 g (9 lb 5 oz) M Vag-Forceps  N ARIANNA       Past Medical History: Past Medical History:   Diagnosis Date   • Acid reflux    • Allergic    • Anxiety    • Arthritis    • Bronchitis    • Depression    • Fibromyalgia    • History of ear infections    • Hypotension    • Hypotension    • Migraines    • Sinusitis    • Sleep apnea    • Urinary tract infection    • Vaginal delivery 2017       Past Surgical History Past Surgical History:   Procedure Laterality Date   • MOUTH SURGERY     • WISDOM TOOTH EXTRACTION        Family History: Family History   Problem Relation Age of Onset   • Stroke Mother    • Hypotension Mother    • Cancer Father    • Diabetes Father    • Hypertension Father    • Diabetes Brother    • Bipolar disorder Brother    • Cancer Paternal Grandmother    • Heart disease Paternal Grandmother    • Ovarian cancer Paternal Grandmother 70   • Diabetes Paternal Grandfather    • Cancer Paternal Grandfather       Social History:  reports that she has never smoked. She has never used smokeless tobacco.   reports previous alcohol use.   reports no history of drug use.        Review of Systems   Constitutional: Negative.    HENT: Negative.    Eyes: Negative.    Respiratory: Negative.    Cardiovascular: Negative.    Gastrointestinal: Negative.    Endocrine: Negative.    Genitourinary: Negative.    Musculoskeletal: Negative.    Skin: Negative.    Allergic/Immunologic: Negative.    Neurological: Negative.    Hematological: Negative.    Psychiatric/Behavioral: Negative.          Objective     Vital Signs Range for the last 24 hours  Temperature: Temp:  [98 °F (36.7 °C)-98.3 °F (36.8 °C)] 98.2 °F (36.8 °C)   Temp Source: Temp src: Oral   BP: BP: ()/(53-66) 114/65   Pulse: Heart Rate:  [77-96] 82   Respirations: Resp:  [14-18] 15   SPO2:     O2 Amount (l/min):     O2 Devices     Weight: Weight:  [156 kg (343 lb)] 156 kg (343 lb)     Physical Examination: General appearance - alert, well appearing, and in no distress  Neck - supple, no significant adenopathy  Chest - clear to auscultation, no wheezes, rales or rhonchi, symmetric air entry  Heart - normal rate, regular rhythm, normal S1, S2, no murmurs, rubs, clicks or gallops  Abdomen - soft, nontender, nondistended, no masses or organomegaly  Pelvic - normal external genitalia, vulva, vagina, cervix, uterus and adnexa  Extremities - peripheral  pulses normal, no pedal edema, no clubbing or cyanosis    Presentation: vertex   Cervix: Exam by:   JESSICA   Dilation: 5-6   Effacement: Cervical Effacement: 50%   Station: Fetal Station: 0-->-3     Fetal Heart Rate Assessment   Method: Fetal HR Assessment Method: external   Beats/min: Fetal HR (beats/min): 125   Baseline: Fetal Heart Baseline Rate: normal range   Variability: Fetal HR Variability: moderate (amplitude range 6 to 25 bpm)   Accels: Fetal HR Accelerations: absent   Decels: Fetal HR Decelerations: absent   Tracing Category:       Uterine Assessment   Method: Method: external tocotransducer   Frequency (min): Contraction Frequency (Minutes): 2-3   Ctx Count in 10 min: Contractions in 10 Minutes: 1   Duration:     Intensity: Contraction Intensity: moderate by palpation   Intensity by IUPC:     Resting Tone: Uterine Resting Tone: soft by palpation   Resting Tone by IUPC:     Dagoberto Units:       Assessment/Plan       Gestational diabetes mellitus, class A2      Assessment & Plan    Assessment:  1.  Intrauterine pregnancy at 38w2d gestation with reactive fetal status.    2.  Gestational diabetes not well controlled by insulin  3.  GBS negative  4. Obesity    Plan:  1. fetal and uterine monitoring  continuously, cervical ripening with  intra-uterine alexandre catheter, labor augmentation  Pitocin and analgesia with  epidural  2. Plan of care has been reviewed with patient and she verbalizes understanding  3.  Risks, benefits of treatment plan have been discussed.  4.  All questions have been answered.        Treva Clemens CNM  2/3/2021  13:18 EST

## 2021-02-04 LAB
BASOPHILS # BLD AUTO: 0.03 10*3/MM3 (ref 0–0.2)
BASOPHILS NFR BLD AUTO: 0.2 % (ref 0–1.5)
DEPRECATED RDW RBC AUTO: 48.7 FL (ref 37–54)
EOSINOPHIL # BLD AUTO: 0.07 10*3/MM3 (ref 0–0.4)
EOSINOPHIL NFR BLD AUTO: 0.6 % (ref 0.3–6.2)
ERYTHROCYTE [DISTWIDTH] IN BLOOD BY AUTOMATED COUNT: 13.8 % (ref 12.3–15.4)
HCT VFR BLD AUTO: 37.2 % (ref 34–46.6)
HGB BLD-MCNC: 11.5 G/DL (ref 12–15.9)
IMM GRANULOCYTES # BLD AUTO: 0.11 10*3/MM3 (ref 0–0.05)
IMM GRANULOCYTES NFR BLD AUTO: 0.9 % (ref 0–0.5)
LYMPHOCYTES # BLD AUTO: 2.28 10*3/MM3 (ref 0.7–3.1)
LYMPHOCYTES NFR BLD AUTO: 18.1 % (ref 19.6–45.3)
MCH RBC QN AUTO: 29.6 PG (ref 26.6–33)
MCHC RBC AUTO-ENTMCNC: 30.9 G/DL (ref 31.5–35.7)
MCV RBC AUTO: 95.9 FL (ref 79–97)
MONOCYTES # BLD AUTO: 0.89 10*3/MM3 (ref 0.1–0.9)
MONOCYTES NFR BLD AUTO: 7.1 % (ref 5–12)
NEUTROPHILS NFR BLD AUTO: 73.1 % (ref 42.7–76)
NEUTROPHILS NFR BLD AUTO: 9.19 10*3/MM3 (ref 1.7–7)
NRBC BLD AUTO-RTO: 0 /100 WBC (ref 0–0.2)
PLATELET # BLD AUTO: 279 10*3/MM3 (ref 140–450)
PMV BLD AUTO: 10.5 FL (ref 6–12)
RBC # BLD AUTO: 3.88 10*6/MM3 (ref 3.77–5.28)
WBC # BLD AUTO: 12.57 10*3/MM3 (ref 3.4–10.8)

## 2021-02-04 PROCEDURE — 85025 COMPLETE CBC W/AUTO DIFF WBC: CPT | Performed by: NURSE PRACTITIONER

## 2021-02-04 RX ADMIN — IBUPROFEN 600 MG: 600 TABLET, FILM COATED ORAL at 08:59

## 2021-02-04 RX ADMIN — IBUPROFEN 600 MG: 600 TABLET, FILM COATED ORAL at 15:06

## 2021-02-04 RX ADMIN — DOCUSATE SODIUM 100 MG: 100 CAPSULE, LIQUID FILLED ORAL at 20:59

## 2021-02-04 RX ADMIN — DOCUSATE SODIUM 100 MG: 100 CAPSULE, LIQUID FILLED ORAL at 08:59

## 2021-02-04 RX ADMIN — IBUPROFEN 600 MG: 600 TABLET, FILM COATED ORAL at 20:59

## 2021-02-04 NOTE — ANESTHESIA POSTPROCEDURE EVALUATION
Patient: Maren Ray    Procedure Summary     Date: 02/03/21 Room / Location:     Anesthesia Start: 1407 Anesthesia Stop: 1951    Procedure: LABOR ANALGESIA Diagnosis:     Scheduled Providers:  Provider: Rudy Avina MD    Anesthesia Type: epidural ASA Status: 3          Anesthesia Type: epidural    Vitals  Vitals Value Taken Time   /73 02/04/21 0900   Temp 97.7 °F (36.5 °C) 02/04/21 0900   Pulse 85 02/04/21 0900   Resp 20 02/04/21 0900   SpO2             Post Anesthesia Care and Evaluation    Patient location during evaluation: bedside  Patient participation: complete - patient participated  Level of consciousness: awake and alert  Pain management: adequate  Airway patency: patent  Anesthetic complications: No anesthetic complications    Cardiovascular status: acceptable  Respiratory status: acceptable  Hydration status: acceptable  Post Neuraxial Block status: Motor and sensory function returned to baseline and No signs or symptoms of PDPH

## 2021-02-04 NOTE — L&D DELIVERY NOTE
[facility]  Vaginal Delivery Note    Delivery     Delivery: Vaginal, Spontaneous  Z3A.38   YOB: 2022    Time of Birth: 7:51 PM      Anesthesia: Epidural     Delivering clinician:     Forceps?   No   Vacuum? No    Shoulder dystocia present: No        Delivery narrative:  Patient precipitously delivered in the bed,  a male infant over a second degree laceration with RN in attendance.  Per RN report, baby was unwrapped from a double nuchal cord.  When I arrived at the bedside the laborist was at the bedside and baby was in the warmer.  I scrubbed in and assumed care for delivery of the placenta and vaginal repair.  The placenta delivered spontaneously and visually intact.  The perineum and vagina were inspected for lacerations. A second degree laceration was repaired with 3-0 and 2-0 rapide.  A midline PUL was repaired with 3-0 rapide.  All counts were correct.  Mom and baby entered recovery in stable condition.     Infant    Findings: male  infant, Rylan Shelton     Infant observations: Weight: 3295 g (7 lb 4.2 oz)   Length: 20.25  in  Observations/Comments:        Apgars: 5  @ 1 minute /    6  @ 5 minutes         Placenta, Cord, and Fluid    Placenta delivered  Spontaneous  at  2/3/2021  7:57 PM     Cord: 3 vessels  present.   Nuchal Cord?  yes; Number of nuchal loops present:  2    Cord blood obtained: Yes    Cord gases obtained:  Yes      Repair    Episiotomy: No   Lacerations: Yes  Laceration Information  Laceration Repaired?   Perineal: 2nd  Yes    Periurethral:   Yes    Labial:       Sulcus:       Vaginal:       Cervical:            Estimated Blood Loss:   300 mls.   Suture used for repair: 2-0 and 3-0 rapide         Complications  none    Disposition  Mother to Mother Baby/Postpartum  in stable condition currently.  Baby to remains with mom  in stable condition currently.      Treva Clemens CNM  02/03/21  21:32 EST    Addendum:  I arrived at the bedside at approximately 1954.

## 2021-02-04 NOTE — PLAN OF CARE
Goal Outcome Evaluation:  Plan of Care Reviewed With: patient, spouse  Progress: improving  Outcome Summary: Mom is pumping for NICU baby.  Teaching done, information given.  Mom has 2 old pumps at home and new pump as been shipped.

## 2021-02-04 NOTE — LACTATION NOTE
02/04/21 0800   Maternal Information   Date of Referral 02/04/21   Person Making Referral physician;other (see comments)  (courtesy)   Infant Reason for Referral NICU admission   Maternal Infant Feeding   Maternal Emotional State receptive;relaxed   Milk Expression/Equipment   Breast Pump Type double electric, hospital grade   Equipment for Home Use pump from previous pregnancy;new breast pump purchased   Breast Pumping   Breast Pumping Interventions frequent pumping encouraged   Breast Pumping double electric breast pump utilized

## 2021-02-04 NOTE — NURSING NOTE
1943- Pt called out stated she was in pain and throwing up.   1946- RN at bedside  1950- Sheet pulled back, infant already delivered to torso  1951- RN delivered remaining body and reduced 2 tight nuchals and began stimulation on infant. RN called out for assistance and provider to bedside  1953- Louise Skinner, Salome Chavez, Lianet Shirley, Trini Keenan, and Dr Mccoy to bedside.   2008- DRT called for assistance   2010- DRT and YURIDIA Clemens CNM at bedside  2012- Respiratory called with cord gas results, YURIDIA Clemens CNM notified, no new orders   2013- Infant grunting and tachycardic, nursery called and notified infant will be coming up  2015- Jalen Sheridan took infant to nursery

## 2021-02-04 NOTE — PROGRESS NOTES
[facility]  Vaginal Delivery Progress Note    Subjective     Tolerating regular diet.Voiding without difficulty. Pain controlled. Decreasing lochia. VSS, Afebrile PP h/h stable. Infant in NICU for respiratory issues but improving.       Objective     Vital Signs Range for the last 24 hours  Temperature: Temp:  [97.7 °F (36.5 °C)-98.6 °F (37 °C)] 97.7 °F (36.5 °C)   Temp Source: Temp src: Oral   BP: BP: ()/(52-85) 116/73   Pulse: Heart Rate:  [] 85   Respirations: Resp:  [16-20] 20   SPO2:     O2 Amount (l/min):     O2 Devices           Physical Exam:  General:  no acute distresss.  Abdomen: Soft, non-tender, fundus firm  Extremities: normal, atraumatic, no cyanosis, and trace edema.       Lab results reviewed:  Yes    Lab Results   Component Value Date    WBC 12.57 (H) 2021    HGB 11.5 (L) 2021    HCT 37.2 2021    MCV 95.9 2021     2021         Assessment/Plan       Gestational diabetes mellitus, class A2     (normal spontaneous vaginal delivery)      Maren Ray is Day 1  post-partum       Plan:  Continue current care.      Rebecca Barbour CNM  2021  12:21 EST

## 2021-02-05 VITALS
DIASTOLIC BLOOD PRESSURE: 68 MMHG | TEMPERATURE: 98.1 F | HEIGHT: 67 IN | BODY MASS INDEX: 45.99 KG/M2 | RESPIRATION RATE: 16 BRPM | WEIGHT: 293 LBS | SYSTOLIC BLOOD PRESSURE: 120 MMHG | HEART RATE: 73 BPM

## 2021-02-05 LAB
CYTO UR: NORMAL
LAB AP CASE REPORT: NORMAL
LAB AP CLINICAL INFORMATION: NORMAL
PATH REPORT.FINAL DX SPEC: NORMAL
PATH REPORT.GROSS SPEC: NORMAL

## 2021-02-05 RX ORDER — IBUPROFEN 600 MG/1
600 TABLET ORAL EVERY 6 HOURS PRN
Qty: 60 TABLET | Refills: 0 | Status: SHIPPED | OUTPATIENT
Start: 2021-02-05 | End: 2022-09-21

## 2021-02-05 RX ORDER — HYDROCODONE BITARTRATE AND ACETAMINOPHEN 5; 325 MG/1; MG/1
1 TABLET ORAL EVERY 6 HOURS PRN
Qty: 10 TABLET | Refills: 0 | Status: SHIPPED | OUTPATIENT
Start: 2021-02-05 | End: 2021-02-10

## 2021-02-05 RX ADMIN — IBUPROFEN 600 MG: 600 TABLET, FILM COATED ORAL at 10:14

## 2021-02-05 RX ADMIN — DOCUSATE SODIUM 100 MG: 100 CAPSULE, LIQUID FILLED ORAL at 10:14

## 2021-02-05 RX ADMIN — IBUPROFEN 600 MG: 600 TABLET, FILM COATED ORAL at 03:30

## 2021-02-05 RX ADMIN — HYDROCODONE BITARTRATE AND ACETAMINOPHEN 1 TABLET: 5; 325 TABLET ORAL at 00:05

## 2021-02-05 NOTE — PROGRESS NOTES
[facility]  Vaginal Delivery Progress Note    Subjective     Doing well, pain controlled, lochia less than menses  Patient verbalizes concerns about her precipitous delivery.  She states she requested to be checked several times due to feeling pressure.  Nursing leadership notified and will speak to patient.        Objective     Vital Signs Range for the last 24 hours  Temperature: Temp:  [98.1 °F (36.7 °C)-98.2 °F (36.8 °C)] 98.1 °F (36.7 °C)   Temp Source: Temp src: Oral   BP: BP: (119-120)/(68) 120/68   Pulse: Heart Rate:  [73-80] 73   Respirations: Resp:  [16-18] 16   SPO2:     O2 Amount (l/min):     O2 Devices           Physical Exam:  General:  no acute distresss.  Abdomen: Soft, non-tender, fundus firm  Extremities: normal, atraumatic, no cyanosis, and trace edema.       Lab results reviewed:  Yes    Lab Results   Component Value Date    WBC 12.57 (H) 2021    HGB 11.5 (L) 2021    HCT 37.2 2021    MCV 95.9 2021     2021         Assessment/Plan       Gestational diabetes mellitus, class A2     (normal spontaneous vaginal delivery)      Maren Ray is Day 2  post-partum       Plan:  Discharge home with standard precautions and return to clinic in 4-6 weeks.      Treva Clemens CNM  2021  17:49 EST

## 2021-02-05 NOTE — PROGRESS NOTES
Out of room during rounds.   Will re-vist this PM  PP day #2 with VSSA  Plan discharge this afternoon    DARRIUS Ryan, ROSARIOM

## 2021-02-05 NOTE — DISCHARGE SUMMARY
"[facility]  Vaginal Delivery Discharge Summary      Patient: Maren Ray      MR#:[id number]  Admission  Diagnosis: Pregnancy at 38 weeks, Gestational diabetes uncontrolled on insulin  Discharge Diagnosis: Vaginal delivery    Date of Admission: 2/2/2021  Date of Discharge:  2/5/2021    Procedures:  Vaginal, Spontaneous     2/3/2021    7:51 PM      Service:  Obstetrics    Hospital Course:  Patient underwent induction of labor for gestational diabetes uncontrolled on insulin.  She experienced a precipitous vaginal delivery and remained in the hospital for 2 days.  During that time she remained afebrile and hemodynamically stable.  On the day of discharge, she was eating, ambulating and voiding without difficulty.      Lab Results   Component Value Date    WBC 12.57 (H) 02/04/2021    HGB 11.5 (L) 02/04/2021    HCT 37.2 02/04/2021    MCV 95.9 02/04/2021     02/04/2021    URICACID 3.8 02/02/2021    AST 13 02/02/2021    ALT 8 02/02/2021     02/02/2021     Results from last 7 days   Lab Units 02/02/21 2032   ABO TYPING  O   RH TYPING  Positive   ANTIBODY SCREEN  Negative       Discharge Medications     Discharge Medications      New Medications      Instructions Start Date   HYDROcodone-acetaminophen 5-325 MG per tablet  Commonly known as: NORCO   1 tablet, Oral, Every 6 Hours PRN      ibuprofen 600 MG tablet  Commonly known as: ADVIL,MOTRIN   600 mg, Oral, Every 6 Hours PRN         Continue These Medications      Instructions Start Date   Insulin Syringe-Needle U-100 30G X 1/2\" 1 ML misc   For use within insulin injections, daily      onetouch ultrasoft lancets   Check blood sugars 6 times daily.      prenatal (CLASSIC) vitamin  tablet  Generic drug: prenatal vitamin   1 tablet, Oral, Daily      sertraline 100 MG tablet  Commonly known as: ZOLOFT   100 mg, Oral, Daily         Stop These Medications    acetone (urine) test strip     Levemir 100 UNIT/ML injection  Generic drug: insulin detemir   "   OneTouch Verio test strip  Generic drug: glucose blood     TYLENOL 8 HOUR PO            Discharge Disposition:  To Home    Discharge Condition:  Stable    Discharge Diet: regular    Activity at Discharge: Pelvic rest    Follow-up Appointments  6 weeks with Sarath Clemens CNM  02/05/21  17:53 EST

## 2021-02-05 NOTE — PLAN OF CARE
Goal Outcome Evaluation:  Vital signs stable, bleeding stable.Voiding without difficulty. Questions answered. VU

## 2021-02-08 NOTE — PAYOR COMM NOTE
"Maren Ray (35 y.o. Female)     Auth#[id number]    Discharged 2/5/21.    From: Denise Sepulveda  #[phone]  Fax#[phone]      Date of Birth Social Security Number Address Home Phone MRN    1985  [address] [id number] [phone] [id number]    Jehovah's witness Marital Status          Jew        Admission Date Admission Type Admitting Provider Attending Provider Department, Room/Bed    2/2/21 Elective Ema Ortiz MD  [facility] MOTHER BABY 4A, N411/1    Discharge Date Discharge Disposition Discharge Destination        2/5/2021 Home or Self Care              Attending Provider: (none)   Allergies: Sulfa Antibiotics    Isolation: None   Infection: None   Code Status: Prior    Ht: 170.2 cm (67\")   Wt: 156 kg (343 lb)    Admission Cmt: None   Principal Problem: None                Active Insurance as of 2/2/2021     Primary Coverage     Payor Plan Insurance Group Employer/Plan Group    [facility] [id number]     Payor Plan Address Payor Plan Phone Number Payor Plan Fax Number Effective Dates    [address]   12/1/2018 - None Entered    [facility] [phone]       Subscriber Name Subscriber Birth Date Member ID       RUDY RAY 8/21/1978 [id number]                 Emergency Contacts      (Rel.) Home Phone Work Phone Mobile Phone    Rudy Ray (Spouse) [phone] -- --               Discharge Summary      Treva Clemens CNM at 02/05/21 1759     Attestation signed by Ani Mart MD at 02/05/21 1850    I have reviewed the documentation and agree                   [facility]  Vaginal Delivery Discharge Summary      Patient: Maren Ray      MR#:[id number]  Admission  Diagnosis: Pregnancy at 38 weeks, Gestational diabetes uncontrolled on insulin  Discharge Diagnosis: Vaginal delivery    Date of Admission: 2/2/2021  Date of Discharge:  2/5/2021    Procedures:  Vaginal, Spontaneous " "    2/3/2021    7:51 PM      Service:  Obstetrics    Hospital Course:  Patient underwent induction of labor for gestational diabetes uncontrolled on insulin.  She experienced a precipitous vaginal delivery and remained in the hospital for 2 days.  During that time she remained afebrile and hemodynamically stable.  On the day of discharge, she was eating, ambulating and voiding without difficulty.      Lab Results   Component Value Date    WBC 12.57 (H) 02/04/2021    HGB 11.5 (L) 02/04/2021    HCT 37.2 02/04/2021    MCV 95.9 02/04/2021     02/04/2021    URICACID 3.8 02/02/2021    AST 13 02/02/2021    ALT 8 02/02/2021     02/02/2021     Results from last 7 days   Lab Units 02/02/21 2032   ABO TYPING  O   RH TYPING  Positive   ANTIBODY SCREEN  Negative       Discharge Medications     Discharge Medications      New Medications      Instructions Start Date   HYDROcodone-acetaminophen 5-325 MG per tablet  Commonly known as: NORCO   1 tablet, Oral, Every 6 Hours PRN      ibuprofen 600 MG tablet  Commonly known as: ADVIL,MOTRIN   600 mg, Oral, Every 6 Hours PRN         Continue These Medications      Instructions Start Date   Insulin Syringe-Needle U-100 30G X 1/2\" 1 ML misc   For use within insulin injections, daily      onetouch ultrasoft lancets   Check blood sugars 6 times daily.      prenatal (CLASSIC) vitamin  tablet  Generic drug: prenatal vitamin   1 tablet, Oral, Daily      sertraline 100 MG tablet  Commonly known as: ZOLOFT   100 mg, Oral, Daily         Stop These Medications    acetone (urine) test strip     Levemir 100 UNIT/ML injection  Generic drug: insulin detemir     OneTouch Verio test strip  Generic drug: glucose blood     TYLENOL 8 HOUR PO            Discharge Disposition:  To Home    Discharge Condition:  Stable    Discharge Diet: regular    Activity at Discharge: Pelvic rest    Follow-up Appointments  6 weeks with Sarath Clemens CNM  02/05/21  17:53 EST    Electronically " signed by Borders, Ani HATHAWAY MD at 02/05/21 3970

## 2022-03-21 ENCOUNTER — TELEPHONE (OUTPATIENT)
Dept: FAMILY MEDICINE CLINIC | Facility: CLINIC | Age: 37
End: 2022-03-21

## 2022-03-21 DIAGNOSIS — G47.30 SLEEP APNEA, UNSPECIFIED TYPE: Primary | ICD-10-CM

## 2022-03-21 NOTE — TELEPHONE ENCOUNTER
Caller: Maren Ray    Relationship: Self    Best call back number: [phone]    What is the medical concern/diagnosis: SLEEP APNEA    What specialty or service is being requested: PULMONOLOGY    Any additional details: PATIENT WAS NEEDING CPAP SUPPLIES HOWEVER NOBODY WILL GIVE THEM TO HER SINCE HER MACHINE WAS RECALLED SO NOW PATIENT NEEDS TO GET A NEW MACHINE BUT NOW NEEDS A NEW PULMONOLOGIST IN ORDER TO GET THAT SINCE HER PREVIOUS PULMONOLOGIST NO LONGER ACCEPTS HER INSURANCE. PLEASE ADVISE AND CALL PATIENT BACK.

## 2022-03-21 NOTE — TELEPHONE ENCOUNTER
Caller: Maren Ray    Relationship: Self    Best call back number:[phone]    What orders are you requesting (i.e. lab or imaging): CPAP SUPPLIES    In what timeframe would the patient need to come in: ASAP    Where will you receive your lab/imaging services: NOT SURE    Additional notes: PATIENT NEEDS NEW CPAP SUPPLIES, MASK, TUBING, FILTER ([employer] [facility]) AND DOES NOT KNOW WHERE TO SEND THE ORDER TO THAT HER INSURANCE WILL COVER. PLEASE ADVISE

## 2022-03-31 ENCOUNTER — OFFICE VISIT (OUTPATIENT)
Dept: SLEEP MEDICINE | Facility: HOSPITAL | Age: 37
End: 2022-03-31

## 2022-03-31 VITALS
DIASTOLIC BLOOD PRESSURE: 63 MMHG | OXYGEN SATURATION: 98 % | WEIGHT: 293 LBS | HEIGHT: 67 IN | SYSTOLIC BLOOD PRESSURE: 132 MMHG | HEART RATE: 91 BPM | BODY MASS INDEX: 45.99 KG/M2

## 2022-03-31 DIAGNOSIS — E66.01 MORBID (SEVERE) OBESITY DUE TO EXCESS CALORIES: ICD-10-CM

## 2022-03-31 DIAGNOSIS — G47.33 OBSTRUCTIVE SLEEP APNEA, ADULT: Primary | ICD-10-CM

## 2022-03-31 PROCEDURE — 99203 OFFICE O/P NEW LOW 30 MIN: CPT | Performed by: INTERNAL MEDICINE

## 2022-03-31 RX ORDER — LANCETS
EACH MISCELLANEOUS
COMMUNITY
End: 2022-09-21

## 2022-04-23 NOTE — PROGRESS NOTES
Chief Complaint  Snoring and obstructive sleep apnea    Subjective         Maren Ray presents to [facility] for the evaluation of snoring and obstructive sleep apnea.  Her primary care provider is Dr. Bliss.  She is referred by Dr. Moore.  She is seen in person in the sleep clinic  History of Present Illness  Patient states she has had snoring essentially all of her life.  She was evaluated at the [facility] 4 years ago and diagnosed with obstructive sleep apnea.  She has been on CPAP since then.  She did not know she had apneas before having her test at the clinic.  She thinks her weight has increased since then.  She feels more rested when she uses her machine has less problems with her fibromyalgia.  She thinks her machine may not be as effective as it had been previously.  She admits she has not received new supplies recently.  She has registered her machine since it has been recalled.    With her mask she does not have snoring is more rested.  She denies morning headaches.  Without her mask she still has snoring and she does not sleep very well.  She remembers being told she had mild sleep apnea with an index of about 6.  She has occasional kicking of her legs at night but does not think it keeps her awake.  She does complain of fibromyalgia pain that may bother her.  She denies any history of reflux.    She goes to bed between 11 PM and midnight.  She will fall asleep in about an hour.  She awakens 4-5 times during the night.  She gets about 6 hours of sleep and says she is rested with the machine.  She denies any history of hypertension, diabetes, or coronary artery disease.    Allergies: Seasonal environmental allergies and sulfa    Habits: Smoking: Without    Ethanol: Without    Caffeine: Without    Medical illnesses: She has fibromyalgia    Medications:Acetone, Urine, Test (TRUEPLUS KETONE TEST STRIPS)    glucose blood test strip    Ibuprofen 200 MG  "capsule    Lancets (onetouch ultrasoft) lancets    amoxicillin (AMOXIL) 500 MG capsule    ibuprofen (ADVIL,MOTRIN) 600 MG tablet    Insulin Syringe-Needle U-100 30G X 1/2\" 1 ML misc    Lancets (onetouch ultrasoft) lancets    norethindrone (MICRONOR) 0.35 MG tablet    prenatal vitamin (prenatal, CLASSIC, vitamin) tablet    sertraline (ZOLOFT) 100 MG tablet      Surgeries: [place] teeth extraction    Family history: Diabetes, hypertension, stroke, obesity, sleep apnea, chronic bronchitis, cancer, restless leg syndrome.    Review of systems: Positives include fatigue, nausea, palpitations, arthralgias, back pain, myalgia, neck pain, neck stiffness, headaches, weakness, easy bruising, decreased concentration, nervousness and anxiety.  Other systems reviewed and negative.    [place] score is 16/24 with her machine  Objective   Vital Signs:   /63 (BP Location: Left arm, Patient Position: Sitting, Cuff Size: Adult)   Pulse 91   Ht 170.2 cm (67\")   Wt (!) 169 kg (373 lb)   SpO2 98%   BMI 58.42 kg/m²           Physical Exam patient peers to be awake and alert.  She does not appear to be in acute respiratory distress.    She is normocephalic.  She has Mallampati class III anatomy.    Lungs are clear.    Cardiac exam revealed normal S1-S2.    Extremities showed no edema.  Result Review :    Download for machine for the past 30 days shows she is she is 200% of the time.  She used it 7 hours 1 minute per night.  Her AHI is normal at 1.1.  She is on a CPAP of 9.     Assessment and Plan   Diagnoses and all orders for this visit:    1. Obstructive sleep apnea, adult (Primary)  -     Detailed AutoPAP Order    2. Morbid (severe) obesity due to excess calories (HCC)    Patient has a history of obstructive sleep apnea but has excellent control of her respiratory events on her current pressure settings.  We will renew her supplies.  We will order her new machine.  She wishes to continue with CPAP.  We have discussed other " potential therapies including weight control, oral appliance, and surgery.  We have discussed the long-term consequences of untreated obstructive sleep apnea.  These include hypertension, diabetes, heart disease, stroke, and dementia.  She is encouraged to lose weight.  She is encouraged to avoid alcohol and sedatives close to bedtime.  She is encouraged to practice lateral position sleep.    We discussed the reasons for the recall of her current machine and again reviewed the problems of having untreated obstructive sleep apnea.  She is to make her own decision regarding continued use.  We will plan to see her back roughly 2 months after she has received her new machine and we can download the ismael to make certain it is being affected.  I spent 35 minutes caring for Maren on this date of service. This time includes time spent by me in the following activities:reviewing tests, obtaining and/or reviewing a separately obtained history, performing a medically appropriate examination and/or evaluation , counseling and educating the patient/family/caregiver, ordering medications, tests, or procedures and documenting information in the medical record  Follow Up   Return in about 2 months (around 5/31/2022) for 31 to 90 days after PAP setup, Next scheduled follow-up.  Patient was given instructions and counseling regarding her condition or for health maintenance advice. Please see specific information pulled into the AVS if appropriate.   Gabo Avina MD [facility]  Sleep Medicine  Pulmonary and Critical Care Medicine

## 2022-05-06 ENCOUNTER — OFFICE VISIT (OUTPATIENT)
Dept: FAMILY MEDICINE CLINIC | Facility: CLINIC | Age: 37
End: 2022-05-06

## 2022-05-06 VITALS
BODY MASS INDEX: 45.99 KG/M2 | DIASTOLIC BLOOD PRESSURE: 80 MMHG | WEIGHT: 293 LBS | HEIGHT: 67 IN | SYSTOLIC BLOOD PRESSURE: 130 MMHG | RESPIRATION RATE: 20 BRPM | TEMPERATURE: 97 F | OXYGEN SATURATION: 98 % | HEART RATE: 81 BPM

## 2022-05-06 DIAGNOSIS — J02.0 STREP THROAT: Primary | ICD-10-CM

## 2022-05-06 DIAGNOSIS — J02.9 SORE THROAT: ICD-10-CM

## 2022-05-06 LAB
EXPIRATION DATE: NORMAL
INTERNAL CONTROL: NORMAL
Lab: NORMAL
S PYO AG THROAT QL: NEGATIVE

## 2022-05-06 PROCEDURE — 99213 OFFICE O/P EST LOW 20 MIN: CPT | Performed by: NURSE PRACTITIONER

## 2022-05-06 PROCEDURE — 87880 STREP A ASSAY W/OPTIC: CPT | Performed by: NURSE PRACTITIONER

## 2022-05-06 RX ORDER — AMOXICILLIN AND CLAVULANATE POTASSIUM 875; 125 MG/1; MG/1
1 TABLET, FILM COATED ORAL 2 TIMES DAILY
Qty: 20 TABLET | Refills: 0 | Status: SHIPPED | OUTPATIENT
Start: 2022-05-06 | End: 2022-09-21

## 2022-05-06 NOTE — PROGRESS NOTES
"Chief Complaint  Sore Throat    Subjective          Maren Ray presents to [facility]  Patient presents to office with complaints of sore throat. Strep test positive in office.     Sore Throat   This is a new problem. The current episode started in the past 7 days. The problem has been gradually worsening. There has been no fever. Associated symptoms include headaches, a hoarse voice, a plugged ear sensation and swollen glands. Pertinent negatives include no congestion, coughing, ear discharge, shortness of breath or stridor. She has tried nothing for the symptoms. The treatment provided no relief.       Objective   Vital Signs:  /80   Pulse 81   Temp 97 °F (36.1 °C)   Resp 20   Ht 170.2 cm (67\")   Wt (!) 166 kg (366 lb)   SpO2 98%   BMI 57.32 kg/m²           Physical Exam  Vitals and nursing note reviewed.   Constitutional:       General: She is not in acute distress.     Appearance: Normal appearance.   HENT:      Head: Normocephalic and atraumatic.      Right Ear: Ear canal and external ear normal. Tympanic membrane is bulging.      Left Ear: Ear canal and external ear normal. Tympanic membrane is bulging.      Nose: Congestion present. No rhinorrhea.      Mouth/Throat:      Mouth: Mucous membranes are moist.      Pharynx: Uvula midline. Pharyngeal swelling, oropharyngeal exudate and posterior oropharyngeal erythema present. No uvula swelling.   Eyes:      Extraocular Movements: Extraocular movements intact.      Conjunctiva/sclera: Conjunctivae normal.      Pupils: Pupils are equal, round, and reactive to light.   Cardiovascular:      Rate and Rhythm: Normal rate and regular rhythm.      Heart sounds: Normal heart sounds.   Pulmonary:      Effort: Pulmonary effort is normal.      Breath sounds: Normal breath sounds.   Musculoskeletal:      Cervical back: Normal range of motion and neck supple. No rigidity or tenderness.      Right lower leg: No edema.      " Left lower leg: No edema.   Lymphadenopathy:      Cervical: Cervical adenopathy present.   Skin:     General: Skin is warm and dry.      Findings: No rash.   Neurological:      Mental Status: She is alert and oriented to person, place, and time.   Psychiatric:         Mood and Affect: Mood normal.         Behavior: Behavior normal.         Thought Content: Thought content normal.         Judgment: Judgment normal.        Result Review :                 Assessment and Plan    Diagnoses and all orders for this visit:    1. Strep throat (Primary)  Assessment & Plan:  Take medications as directed  Drink plenty of fluids  Gargle warm salt water  Discard toothbrush after 24 hours antibiotic  Tylenol/Motrin PRN as directed  RTO or call PRN persistent or worsening symptoms      Orders:  -     amoxicillin-clavulanate (Augmentin) 875-125 MG per tablet; Take 1 tablet by mouth 2 (Two) Times a Day.  Dispense: 20 tablet; Refill: 0    2. Sore throat  -     POCT rapid strep A           Follow Up   Return if symptoms worsen or fail to improve.  Patient was given instructions and counseling regarding her condition or for health maintenance advice. Please see specific information pulled into the AVS if appropriate.

## 2022-06-16 ENCOUNTER — TELEPHONE (OUTPATIENT)
Dept: FAMILY MEDICINE CLINIC | Facility: CLINIC | Age: 37
End: 2022-06-16

## 2022-06-16 NOTE — TELEPHONE ENCOUNTER
Caller: Maren Ray    Relationship: Self    Best call back number: [phone]    What is the best time to reach you: 6AM TO 10PM    Who are you requesting to speak with (clinical staff, provider,  specific staff member): CLINICAL    Do you know the name of the person who called: REFERRAL FOR SHERMAN LEYLA   [facility] OR PLEASE CALL IF NOTIFICATION WAS SENT THAT THEY HAVE IT SENT THAT DATE 12.12.19    Do you require a callback: YES

## 2022-06-28 NOTE — TELEPHONE ENCOUNTER
Pt wanted proof that her referral was place on 12/19/18. Notified pt that she can go through medical records or access through [facility]

## 2022-09-21 ENCOUNTER — OFFICE VISIT (OUTPATIENT)
Dept: FAMILY MEDICINE CLINIC | Facility: CLINIC | Age: 37
End: 2022-09-21

## 2022-09-21 VITALS
WEIGHT: 293 LBS | SYSTOLIC BLOOD PRESSURE: 120 MMHG | TEMPERATURE: 98.7 F | OXYGEN SATURATION: 100 % | RESPIRATION RATE: 13 BRPM | BODY MASS INDEX: 55.6 KG/M2 | HEART RATE: 85 BPM | DIASTOLIC BLOOD PRESSURE: 88 MMHG

## 2022-09-21 DIAGNOSIS — G47.30 SLEEP APNEA, UNSPECIFIED TYPE: Primary | ICD-10-CM

## 2022-09-21 PROCEDURE — 99213 OFFICE O/P EST LOW 20 MIN: CPT | Performed by: FAMILY MEDICINE

## 2022-09-21 NOTE — PROGRESS NOTES
Subjective   Maren Ray is a 37 y.o. female.     History of Present Illness req ref sleep.  Her previous sleep doctor is no longer covered and her previous sleep doctor had placed her orders based on a sleep study from the doctor she had before that.  She has called and discussed this at length with her insurance and it sounds like at this time she needs a new referral to a sleep disorders doctor that is located in [place].  This sounds like the only one that her insurance covers.  Apparently she needs a new machine and this is the only way for her to get the new machine.      The following portions of the patient's history were reviewed and updated as appropriate: allergies, current medications, past family history, past medical history, past social history, past surgical history and problem list.    Review of Systems   Constitutional: Negative.    HENT: Negative.    Eyes: Negative.    Respiratory: Negative.    Cardiovascular: Negative.    Gastrointestinal: Negative.    Endocrine: Negative.    Genitourinary: Negative.    Musculoskeletal: Negative.    Skin: Negative.    Allergic/Immunologic: Negative.    Neurological: Negative.    Hematological: Negative.    Psychiatric/Behavioral: Negative.    All other systems reviewed and are negative.      Objective     Vitals:    09/21/22 1351   BP: 120/88   Pulse: 85   Resp: 13   Temp: 98.7 °F (37.1 °C)   SpO2: 100%   Weight: (!) 161 kg (355 lb)       Physical Exam  Vitals and nursing note reviewed.   Constitutional:       Appearance: She is obese.   Cardiovascular:      Rate and Rhythm: Normal rate.      Heart sounds: Normal heart sounds.   Pulmonary:      Effort: Pulmonary effort is normal.      Breath sounds: Normal breath sounds.   Neurological:      Mental Status: She is alert.   Psychiatric:         Mood and Affect: Mood normal.         Thought Content: Thought content normal.         Judgment: Judgment normal.         Assessment & Plan     Problem List Items  Addressed This Visit        Sleep    Sleep apnea - Primary (Chronic)    Relevant Orders    Ambulatory Referral to Pulmonology (Completed)

## 2022-09-29 PROCEDURE — 87070 CULTURE OTHR SPECIMN AEROBIC: CPT | Performed by: FAMILY MEDICINE

## 2022-09-29 PROCEDURE — 87205 SMEAR GRAM STAIN: CPT | Performed by: FAMILY MEDICINE

## 2022-12-12 ENCOUNTER — OFFICE VISIT (OUTPATIENT)
Dept: FAMILY MEDICINE CLINIC | Facility: CLINIC | Age: 37
End: 2022-12-12

## 2022-12-12 VITALS
BODY MASS INDEX: 45.99 KG/M2 | HEART RATE: 104 BPM | TEMPERATURE: 99.1 F | WEIGHT: 293 LBS | HEIGHT: 67 IN | SYSTOLIC BLOOD PRESSURE: 130 MMHG | DIASTOLIC BLOOD PRESSURE: 90 MMHG | OXYGEN SATURATION: 99 %

## 2022-12-12 DIAGNOSIS — J02.0 STREP PHARYNGITIS: Primary | ICD-10-CM

## 2022-12-12 DIAGNOSIS — J02.9 SORE THROAT: ICD-10-CM

## 2022-12-12 LAB
EXPIRATION DATE: ABNORMAL
EXPIRATION DATE: NORMAL
FLUAV AG NPH QL: NEGATIVE
FLUBV AG NPH QL: NEGATIVE
INTERNAL CONTROL: ABNORMAL
INTERNAL CONTROL: NORMAL
Lab: ABNORMAL
Lab: NORMAL
S PYO AG THROAT QL: POSITIVE

## 2022-12-12 PROCEDURE — 99213 OFFICE O/P EST LOW 20 MIN: CPT | Performed by: FAMILY MEDICINE

## 2022-12-12 PROCEDURE — 87804 INFLUENZA ASSAY W/OPTIC: CPT | Performed by: FAMILY MEDICINE

## 2022-12-12 PROCEDURE — 87880 STREP A ASSAY W/OPTIC: CPT | Performed by: FAMILY MEDICINE

## 2022-12-12 RX ORDER — FLUCONAZOLE 150 MG/1
150 TABLET ORAL EVERY OTHER DAY
Qty: 3 TABLET | Refills: 0 | OUTPATIENT
Start: 2022-12-12 | End: 2023-01-08

## 2022-12-12 RX ORDER — AMOXICILLIN AND CLAVULANATE POTASSIUM 875; 125 MG/1; MG/1
1 TABLET, FILM COATED ORAL 2 TIMES DAILY
Qty: 20 TABLET | Refills: 0 | Status: SHIPPED | OUTPATIENT
Start: 2022-12-12 | End: 2022-12-22

## 2022-12-12 RX ORDER — METHYLPREDNISOLONE 4 MG/1
TABLET ORAL
Qty: 21 TABLET | Refills: 0 | OUTPATIENT
Start: 2022-12-12 | End: 2023-01-08

## 2022-12-12 NOTE — ASSESSMENT & PLAN NOTE
Rapid strep swab positive in office today.  Patient appears moderately ill. Temp as noted above. Exudative pharyngo-tonsillitis is noted. Anterior cervical nodes are present.    I advised patient to continue/start gargling warm salt water.  Rx Augmentin x 10 days  I advised patient to replace toothbrush after 3 days.    Continue allergy medications, Tylenol/ibuprofen as needed.  If symptoms do not improve patient to return to clinic for reevaluation

## 2022-12-12 NOTE — PROGRESS NOTES
"Chief Complaint  Generalized Body Aches (Started yesterday/Started getting chills late last night, right side of throat hurts and right ear, headaches/Pt had a crown on her tooth today and since then she is feeling worse/Fibromyalgia issue and she isn't sure what is going on)    Subjective        Maren Ray presents to [facility]  Sore Throat   This is a new problem. The current episode started yesterday. The problem has been waxing and waning. The maximum temperature recorded prior to her arrival was 100.4 - 100.9 F. The fever has been present for less than 1 day. Associated symptoms include congestion, coughing, ear pain, neck pain and trouble swallowing. Pertinent negatives include no diarrhea, drooling or vomiting. She has had exposure to strep. She has tried nothing for the symptoms. The treatment provided mild relief.       Objective   Vital Signs:  /90   Pulse 104   Temp 99.1 °F (37.3 °C) (Infrared)   Ht 170.2 cm (67.01\")   Wt (!) 160 kg (352 lb 3.2 oz)   SpO2 99%   BMI 55.15 kg/m²   Estimated body mass index is 55.15 kg/m² as calculated from the following:    Height as of this encounter: 170.2 cm (67.01\").    Weight as of this encounter: 160 kg (352 lb 3.2 oz).    Class 3 Severe Obesity (BMI >=40). Obesity-related health conditions include the following: diabetes mellitus and osteoarthritis. Obesity is unchanged. BMI is is above average; BMI management plan is completed. We discussed low calorie, low carb based diet program, portion control and increasing exercise. (shared in avs)       Physical Exam  Constitutional:       Appearance: Normal appearance. She is normal weight.   HENT:      Right Ear: A middle ear effusion is present.      Left Ear: A middle ear effusion is present. Tympanic membrane is erythematous.      Nose:      Right Turbinates: Swollen.      Left Turbinates: Swollen.      Mouth/Throat:      Pharynx: Posterior oropharyngeal erythema " present.      Tonsils: 3+ on the right. 3+ on the left.   Neurological:      Mental Status: She is alert.        Result Review :       Assessment and Plan   Diagnoses and all orders for this visit:    1. Strep pharyngitis (Primary)  Assessment & Plan:  Rapid strep swab positive in office today.  Patient appears moderately ill. Temp as noted above. Exudative pharyngo-tonsillitis is noted. Anterior cervical nodes are present.    I advised patient to continue/start gargling warm salt water.  Rx Augmentin x 10 days  I advised patient to replace toothbrush after 3 days.    Continue allergy medications, Tylenol/ibuprofen as needed.  If symptoms do not improve patient to return to clinic for reevaluation          Orders:  -     fluconazole (Diflucan) 150 MG tablet; Take 1 tablet by mouth Every Other Day.  Dispense: 3 tablet; Refill: 0  -     amoxicillin-clavulanate (Augmentin) 875-125 MG per tablet; Take 1 tablet by mouth 2 (Two) Times a Day for 10 days.  Dispense: 20 tablet; Refill: 0  -     methylPREDNISolone (MEDROL) 4 MG dose pack; Take as directed on package instructions.  Dispense: 21 tablet; Refill: 0    2. Sore throat  -     POCT rapid strep A  -     POCT Influenza A/B       Follow Up   No follow-ups on file.  Patient was given instructions and counseling regarding her condition or for health maintenance advice. Please see specific information pulled into the AVS if appropriate.     This document has been electronically signed by Elyssa Orellana DO   December 12, 2022 17:21 EST    Dictated Utilizing Dragon Dictation: Part of this note may be an electronic transcription/translation of spoken language to printed text using the Dragon Dictation System.    Elyssa Orellana D.O.  [facility] [place]

## 2023-01-08 PROCEDURE — 87070 CULTURE OTHR SPECIMN AEROBIC: CPT | Performed by: NURSE PRACTITIONER

## 2023-01-08 PROCEDURE — 87205 SMEAR GRAM STAIN: CPT | Performed by: NURSE PRACTITIONER

## 2023-03-14 ENCOUNTER — OFFICE VISIT (OUTPATIENT)
Dept: FAMILY MEDICINE CLINIC | Facility: CLINIC | Age: 38
End: 2023-03-14
Payer: COMMERCIAL

## 2023-03-14 VITALS
HEIGHT: 67 IN | HEART RATE: 86 BPM | BODY MASS INDEX: 45.99 KG/M2 | DIASTOLIC BLOOD PRESSURE: 82 MMHG | WEIGHT: 293 LBS | OXYGEN SATURATION: 99 % | TEMPERATURE: 98.4 F | SYSTOLIC BLOOD PRESSURE: 128 MMHG

## 2023-03-14 DIAGNOSIS — H69.83 EUSTACHIAN TUBE DYSFUNCTION, BILATERAL: Primary | ICD-10-CM

## 2023-03-14 PROCEDURE — 96372 THER/PROPH/DIAG INJ SC/IM: CPT | Performed by: PHYSICIAN ASSISTANT

## 2023-03-14 PROCEDURE — 99213 OFFICE O/P EST LOW 20 MIN: CPT | Performed by: PHYSICIAN ASSISTANT

## 2023-03-14 RX ORDER — METHYLPREDNISOLONE ACETATE 80 MG/ML
80 INJECTION, SUSPENSION INTRA-ARTICULAR; INTRALESIONAL; INTRAMUSCULAR; SOFT TISSUE ONCE
Status: COMPLETED | OUTPATIENT
Start: 2023-03-14 | End: 2023-03-14

## 2023-03-14 RX ORDER — FLUTICASONE PROPIONATE 50 MCG
2 SPRAY, SUSPENSION (ML) NASAL DAILY
Qty: 18.2 ML | Refills: 5 | Status: SHIPPED | OUTPATIENT
Start: 2023-03-14

## 2023-03-14 RX ORDER — LORATADINE 10 MG/1
10 TABLET ORAL DAILY
Qty: 30 TABLET | Refills: 5 | Status: SHIPPED | OUTPATIENT
Start: 2023-03-14

## 2023-03-14 RX ADMIN — METHYLPREDNISOLONE ACETATE 80 MG: 80 INJECTION, SUSPENSION INTRA-ARTICULAR; INTRALESIONAL; INTRAMUSCULAR; SOFT TISSUE at 18:14

## 2023-03-14 NOTE — PROGRESS NOTES
"     Follow Up Office Visit      Date: 2023   Patient Name: Maren Ray  : 1985   MRN: [id number]     Chief Complaint:    Chief Complaint   Patient presents with   • Ear Fullness     Right ear feels funny, has some pressure  Then last week it started hurting and now its got a lot drainage, skin is now irritated.  Pain is gone now, pressure has moved inward, hearing  is good most times but she gets muffling ever once in a while. Left ear feeling similar but not to the extreme       History of Present Illness: Maren Ray is a 37 y.o. female who is here today to follow up with right ear fullness and pressure.  Left ear to some extent as well.  Last week she had pain but that is gone and now is just fullness and pressure.  No fever.      Subjective      Review of systems:  Review of Systems   Constitutional: Negative for fatigue and fever.   HENT: Negative for trouble swallowing.    Eyes: Negative for visual disturbance.   Respiratory: Negative for shortness of breath.    Cardiovascular: Negative for chest pain and leg swelling.        I have reviewed and the following portions of the patient's history were updated as appropriate: past family history, past medical history, past social history, past surgical history and problem list.    Medications:     Current Outpatient Medications:   •  Ibuprofen 200 MG capsule, Take 4 capsules by mouth Every 6 (Six) Hours., Disp: , Rfl:   •  fluticasone (FLONASE) 50 MCG/ACT nasal spray, 2 sprays into the nostril(s) as directed by provider Daily., Disp: 18.2 mL, Rfl: 5  •  loratadine (Claritin) 10 MG tablet, Take 1 tablet by mouth Daily., Disp: 30 tablet, Rfl: 5    Allergies:   Allergies   Allergen Reactions   • Sulfa Antibiotics Rash and Angioedema       Objective     Vital Signs:   Vitals:    23 1736   BP: 128/82   Pulse: 86   Temp: 98.4 °F (36.9 °C)   TempSrc: Infrared   SpO2: 99%   Weight: (!) 162 kg (357 lb 9.6 oz)   Height: 170.2 cm (67\") "   PainSc: 0-No pain     Body mass index is 56.01 kg/m².   Class 3 Severe Obesity (BMI >=40). Obesity-related health conditions include the following: GERD. Obesity is unchanged. BMI is is above average; BMI management plan is completed. We discussed low calorie, low carb based diet program, portion control and increasing exercise.      Physical Exam:   Physical Exam  Vitals and nursing note reviewed.   Constitutional:       Appearance: Normal appearance.   HENT:      Right Ear: Ear canal normal. A middle ear effusion is present. Tympanic membrane is not erythematous.      Left Ear: Ear canal normal. A middle ear effusion is present. Tympanic membrane is not erythematous.   Cardiovascular:      Rate and Rhythm: Normal rate and regular rhythm.   Pulmonary:      Effort: Pulmonary effort is normal.      Breath sounds: Normal breath sounds.   Musculoskeletal:      Cervical back: Neck supple.   Neurological:      Mental Status: She is alert.          Assessment / Plan      Assessment/Plan:   Diagnoses and all orders for this visit:    1. Eustachian tube dysfunction, bilateral (Primary)  -     fluticasone (FLONASE) 50 MCG/ACT nasal spray; 2 sprays into the nostril(s) as directed by provider Daily.  Dispense: 18.2 mL; Refill: 5  -     loratadine (Claritin) 10 MG tablet; Take 1 tablet by mouth Daily.  Dispense: 30 tablet; Refill: 5  -     methylPREDNISolone acetate (DEPO-medrol) injection 80 mg    Start Flonase and Claritin as directed.  We will do Depo-Medrol 80 mg IM.  Monitor.  If no improvement refer to ENT.    Follow Up:   No follow-ups on file.    Leonarda Harris PA-C   [facility] [place]

## 2023-05-23 ENCOUNTER — TELEPHONE (OUTPATIENT)
Dept: FAMILY MEDICINE CLINIC | Facility: CLINIC | Age: 38
End: 2023-05-23

## 2023-05-23 ENCOUNTER — OFFICE VISIT (OUTPATIENT)
Dept: FAMILY MEDICINE CLINIC | Facility: CLINIC | Age: 38
End: 2023-05-23
Payer: COMMERCIAL

## 2023-05-23 VITALS
TEMPERATURE: 98 F | WEIGHT: 293 LBS | BODY MASS INDEX: 45.99 KG/M2 | DIASTOLIC BLOOD PRESSURE: 79 MMHG | HEART RATE: 84 BPM | SYSTOLIC BLOOD PRESSURE: 125 MMHG | HEIGHT: 67 IN | OXYGEN SATURATION: 99 %

## 2023-05-23 DIAGNOSIS — F41.9 ANXIETY: Primary | ICD-10-CM

## 2023-05-23 DIAGNOSIS — F32.A DEPRESSION, UNSPECIFIED DEPRESSION TYPE: ICD-10-CM

## 2023-05-23 PROCEDURE — 99214 OFFICE O/P EST MOD 30 MIN: CPT | Performed by: NURSE PRACTITIONER

## 2023-05-23 NOTE — PROGRESS NOTES
Follow Up Office Note     Patient Name: Maren Ray  : 1985   MRN: [id number]     Chief Complaint:    Chief Complaint   Patient presents with   • Anxiety   • Depression       History of Present Illness: Maren Ray is a 37 y.o. female who presents today with c/o worsening of chronic anxiety over the past several weeks. She states that she has even been having some panic attacks. Patient states that there was no precipitating event that triggered her anxiety flare. She states that she has had issues with anxiety since she was a teenager. Patient states that she has tried Cymbalta in the past, but did not like how it made her feel so she discontinued taking it. Patient states that she has also tried sertraline in the past, but only had minimal benefit. She states that she only took a low dose (25 mg) for about one month.    Patient also c/o depression. She states that her fibromyalgia has been flaring recently and this has exacerbated her chronic depression. She states that she gets discouraged about her health at times and this makes her feel more depressed than normal.      Subjective      I have reviewed and the following portions of the patient's history were updated as appropriate: past family history, past medical history, past social history, past surgical history and problem list.    Review of Systems:   Review of Systems   Constitutional: Negative.    Respiratory: Negative.    Cardiovascular: Negative.    Psychiatric/Behavioral: Positive for dysphoric mood. Negative for self-injury and suicidal ideas. The patient is nervous/anxious.         Past Medical History:   Past Medical History:   Diagnosis Date   • Acid reflux    • Allergic    • Anxiety    • Arthritis    • Bronchitis    • Depression    • Fibromyalgia    • History of ear infections    • Hypotension    • Hypotension    • Migraines    • Sinusitis    • Sleep apnea    • Urinary tract infection    • Vaginal delivery 2017          Medications:     Current Outpatient Medications:   •  fluticasone (FLONASE) 50 MCG/ACT nasal spray, 2 sprays into the nostril(s) as directed by provider Daily., Disp: 18.2 mL, Rfl: 5  •  Ibuprofen 200 MG capsule, Take 4 capsules by mouth Every 6 (Six) Hours., Disp: , Rfl:   •  loratadine (Claritin) 10 MG tablet, Take 1 tablet by mouth Daily., Disp: 30 tablet, Rfl: 5  •  sertraline (Zoloft) 50 MG tablet, 1/2 tablet PO QD x 2 weeks, then increase to whole tablet daily, Disp: 30 tablet, Rfl: 2    Allergies:   Allergies   Allergen Reactions   • Sulfa Antibiotics Rash and Angioedema         5/23/2023     3:33 PM   PHQ-2/PHQ-9 Depression Screening   Little Interest or Pleasure in Doing Things 3-->nearly every day   Feeling Down, Depressed or Hopeless 3-->nearly every day   Trouble Falling or Staying Asleep, or Sleeping Too Much 3-->nearly every day   Feeling Tired or Having Little Energy 3-->nearly every day   Poor Appetite or Overeating 0-->not at all   Feeling Bad about Yourself - or that You are a Failure or Have Let Yourself or Your Family Down 3-->nearly every day   Trouble Concentrating on Things, Such as Reading the Newspaper or Watching Television 3-->nearly every day   Moving or Speaking So Slowly that Other People Could Have Noticed? Or the Opposite - Being So Fidgety 0-->not at all   Thoughts that You Would be Better Off Dead or of Hurting Yourself in Some Way 0-->not at all   PHQ-9: Brief Depression Severity Measure Score 18   If You Checked Off Any Problems, How Difficult Have These Problems Made It For You to Do Your Work, Take Care of Things at Home, or Get Along with Other People? not difficult at all      ARIE-7  Feeling nervous, anxious or on edge: Nearly every day  Not being able to stop or control worrying: Nearly every day  Worrying too much about different things: Nearly every day  Trouble Relaxing: Nearly every day  Being so restless that it is hard to sit still: Not at all  Feeling afraid as  "if something awful might happen: Nearly every day  Becoming easily annoyed or irritable: Nearly every day  ARIE 7 Total Score: 18  If you checked any problems, how difficult have these problems made it for you to do your work, take care of things at home, or get along with other people: Somewhat difficult          Objective     Physical Exam:  Vital Signs:   Vitals:    05/23/23 1538   BP: 125/79   Pulse: 84   Temp: 98 °F (36.7 °C)   TempSrc: Infrared   SpO2: 99%   Weight: (!) 161 kg (354 lb)   Height: 170.2 cm (67.01\")   PainSc: 0-No pain     Body mass index is 55.43 kg/m².     Physical Exam  Vitals and nursing note reviewed.   Constitutional:       General: She is not in acute distress.     Appearance: Normal appearance. She is well-developed. She is not ill-appearing, toxic-appearing or diaphoretic.   HENT:      Head: Normocephalic and atraumatic.   Cardiovascular:      Rate and Rhythm: Normal rate and regular rhythm.      Heart sounds: Normal heart sounds.   Pulmonary:      Effort: Pulmonary effort is normal. No respiratory distress.      Breath sounds: Normal breath sounds. No stridor. No wheezing.   Skin:     General: Skin is warm and dry.   Neurological:      General: No focal deficit present.      Mental Status: She is alert and oriented to person, place, and time.   Psychiatric:         Attention and Perception: Attention and perception normal.         Mood and Affect: Mood is anxious. Affect is tearful.         Speech: Speech normal.         Behavior: Behavior normal. Behavior is cooperative.         Thought Content: Thought content normal.         Cognition and Memory: Cognition and memory normal.         Judgment: Judgment normal.         Assessment / Plan      Assessment/Plan:   Diagnoses and all orders for this visit:    1. Anxiety (Primary)  Assessment & Plan:  Acute exacerbation of chronic anxiety.  Plan to begin sertraline.  F/U 4- weeks.    Orders:  -     sertraline (Zoloft) 50 MG tablet; 1/2 tablet " PO QD x 2 weeks, then increase to whole tablet daily  Dispense: 30 tablet; Refill: 2    2. Depression, unspecified depression type  Assessment & Plan:  Patient's depression is recurrent and is moderate without psychosis. Their depression is currently active and the condition is worsening. This will be reassessed 4-6 weeks. F/U as described:patient was prescribed an antidepressant medicine.  Recommend referral to behavioral health  should sx fail to improve with treatment.    Orders:  -     sertraline (Zoloft) 50 MG tablet; 1/2 tablet PO QD x 2 weeks, then increase to whole tablet daily  Dispense: 30 tablet; Refill: 2         Follow Up:   PRN and at next scheduled appointment(s) with PCP.    Discussed the nature of the medical condition(s) risks, complications, implications, management, safe and proper use of medications. Encouraged medication compliance, and keeping scheduled follow up appointments with me and any other providers.      RTC if symptoms fail to improve, to ER if symptoms worsen.        *Dictated Utilizing Dragon Dictation   Please note that portions of this note were completed with a voice recognition program.   Part of this note may be an electronic transcription/translation of spoken language to printed text using the Dragon Dictation System. Spelling and/or grammatical errors may exist despite efforts at proofreading.        DARRIUS Harvey  [facility] [place]

## 2023-05-23 NOTE — TELEPHONE ENCOUNTER
Caller: Maren Ray    Relationship: Self    Best call back number: [phone]    What is the best time to reach you: AS SOON AS POSSIBLE     Who are you requesting to speak with (clinical staff, provider,  specific staff member): CLINICAL STAFF        What was the call regarding: THE PATIENT WAS SEEN TODAY SHE WOULD LIKE TO KNOW IF HER MEDICATIONS HAVE BEEN CALLED IN SHE STATES THAT THE PHARMACY TOLD HER THAT THEY DO NOT HAVE ANYTHING     Do you require a callback: YES

## 2023-05-24 PROBLEM — F41.9 ANXIETY: Chronic | Status: ACTIVE | Noted: 2023-05-24

## 2023-05-24 PROBLEM — F32.A DEPRESSION: Chronic | Status: ACTIVE | Noted: 2017-02-27

## 2023-05-24 PROBLEM — F41.9 ANXIETY: Status: ACTIVE | Noted: 2023-05-24

## 2023-05-24 NOTE — ASSESSMENT & PLAN NOTE
Patient's depression is recurrent and is moderate without psychosis. Their depression is currently active and the condition is worsening. This will be reassessed 4-6 weeks. F/U as described:patient was prescribed an antidepressant medicine.  Recommend referral to behavioral health  should sx fail to improve with treatment.

## 2023-05-25 NOTE — TELEPHONE ENCOUNTER
THE PATIENT IS CALLING BACK SHE STATES THAT SHE CALLED [facility] [address] AND THEY TOLD HER THAT THEY STILL DO NOT HAVE THE PRESCRIPTION [facility] TOLD THAT PATIENT THAT THEY NEED A NEW PRESCRIPTION SENT IN

## 2023-05-31 NOTE — TELEPHONE ENCOUNTER
PATIENT CALLING BACK AGAIN STATING [facility] [address] IS STILL SAYING THEY DO NOT HAVE A PRESCRIPTION FOR THE MEDICATION LISTED BELOW. TRIED CALLING  BUT DID NOT GET AN ANSWER.PATIENT NEEDS FOR IT TO BE SENT AGAIN OR SHE CAN COME  THE PAPER PRESCRIPTION AT THE OFFICE. CALL PATIENT TO ADVISE

## 2023-06-07 NOTE — TELEPHONE ENCOUNTER
PATIENT IS CALLING BACK AGAIN. SHE STATES THAT [facility] DOES NOT KNOW THE REFILL HAS BEEN SENT. PLEASE RESEND THE PRESCRIPTION.    PHONE: [phone]

## 2023-06-10 DIAGNOSIS — F41.9 ANXIETY: ICD-10-CM

## 2023-06-10 DIAGNOSIS — F32.A DEPRESSION, UNSPECIFIED DEPRESSION TYPE: ICD-10-CM

## 2023-07-27 ENCOUNTER — TELEPHONE (OUTPATIENT)
Dept: FAMILY MEDICINE CLINIC | Facility: CLINIC | Age: 38
End: 2023-07-27

## 2023-07-27 ENCOUNTER — OFFICE VISIT (OUTPATIENT)
Dept: FAMILY MEDICINE CLINIC | Facility: CLINIC | Age: 38
End: 2023-07-27
Payer: COMMERCIAL

## 2023-07-27 VITALS
SYSTOLIC BLOOD PRESSURE: 130 MMHG | DIASTOLIC BLOOD PRESSURE: 96 MMHG | BODY MASS INDEX: 45.99 KG/M2 | HEART RATE: 76 BPM | TEMPERATURE: 99.1 F | WEIGHT: 293 LBS | HEIGHT: 67 IN

## 2023-07-27 DIAGNOSIS — H60.393 OTHER INFECTIVE ACUTE OTITIS EXTERNA OF BOTH EARS: Primary | ICD-10-CM

## 2023-07-27 PROCEDURE — 99213 OFFICE O/P EST LOW 20 MIN: CPT | Performed by: FAMILY MEDICINE

## 2023-07-27 RX ORDER — DEXAMETHASONE SODIUM PHOSPHATE 1 MG/ML
SOLUTION/ DROPS OPHTHALMIC
Qty: 5 ML | Refills: 0 | Status: SHIPPED | OUTPATIENT
Start: 2023-07-27

## 2023-07-27 RX ORDER — OFLOXACIN 3 MG/ML
2 SOLUTION/ DROPS OPHTHALMIC 2 TIMES DAILY
Qty: 5 ML | Refills: 0 | Status: SHIPPED | OUTPATIENT
Start: 2023-07-27

## 2023-07-27 RX ORDER — CHLORHEXIDINE GLUCONATE 0.12 MG/ML
RINSE ORAL
COMMUNITY
Start: 2023-05-31 | End: 2023-07-27

## 2023-07-27 RX ORDER — CIPROFLOXACIN AND DEXAMETHASONE 3; 1 MG/ML; MG/ML
4 SUSPENSION/ DROPS AURICULAR (OTIC) 2 TIMES DAILY
Qty: 7.5 ML | Refills: 0 | Status: SHIPPED | OUTPATIENT
Start: 2023-07-27 | End: 2023-07-27

## 2023-07-27 NOTE — TELEPHONE ENCOUNTER
Caller: Maren Ray    Relationship: Self    Best call back number: [phone]     What is the best time to reach you: ANYTIME    Who are you requesting to speak with (clinical staff, provider,  specific staff member): CLINICAL STAFF    What was the call regarding: PATIENT STATED THAT THE CIPROFLOXACIN IS OVER $160.00 AND SHE WOULD LIKE TO KNOW IF THERE IS A CHEAPER MEDICATION THAT CAN BE PRESCRIBED?

## 2023-07-27 NOTE — PROGRESS NOTES
Subjective     Chief Complaint  Ear Drainage (Ear has been draining, causing skin issues)    Subjective          Maren Ray is a 37 y.o. female who presents today to [facility] for follow up.    HPI:   Ear Drainage   There is pain in both ears. This is a recurrent problem. The current episode started more than 1 month ago. The problem occurs constantly. The problem has been waxing and waning. There has been no fever. The patient is experiencing no pain. Associated symptoms include diarrhea, ear discharge and a rash. Pertinent negatives include no coughing, headaches or rhinorrhea. She has tried antibiotics for the symptoms. The treatment provided mild relief. Her past medical history is significant for a chronic ear infection.     The following portions of the patient's history were reviewed and updated as appropriate: allergies, current medications, past family history, past medical history, past social history, past surgical history and problem list.    Objective     Objective     Allergy:   Allergies   Allergen Reactions    Sulfa Antibiotics Rash and Angioedema        Current Medications:   Current Outpatient Medications   Medication Sig Dispense Refill    Ibuprofen 200 MG capsule Take 4 capsules by mouth Every 6 (Six) Hours.      sertraline (Zoloft) 50 MG tablet Take 1 tablet by mouth Daily. 1/2 tablet PO QD x 2 weeks, then increase to whole tablet daily 30 tablet 2     No current facility-administered medications for this visit.       Past Medical History:  Past Medical History:   Diagnosis Date    Acid reflux     Allergic     Anxiety     Arthritis     Bronchitis     Depression     Fibromyalgia     History of ear infections     Hypotension     Hypotension     Migraines     Sinusitis     Sleep apnea     Urinary tract infection     Vaginal delivery 04/21/2017       Past Surgical History:  Past Surgical History:   Procedure Laterality Date    MOUTH SURGERY       "WISDOM TOOTH EXTRACTION         Social History:  Social History     Socioeconomic History    Marital status:      Spouse name: Rudy   Tobacco Use    Smoking status: Never     Passive exposure: Never    Smokeless tobacco: Never   Vaping Use    Vaping Use: Never used   Substance and Sexual Activity    Alcohol use: Not Currently    Drug use: No    Sexual activity: Defer       Family History:  Family History   Problem Relation Age of Onset    Chronic bronchitis Mother     Stroke Mother     Hypotension Mother     Restless legs syndrome Mother     Sleep apnea Father     Obesity Father     Cancer Father     Diabetes Father     Hypertension Father     Obesity Brother     Diabetes Brother     Bipolar disorder Brother     Cancer Paternal Grandmother     Heart disease Paternal Grandmother     Ovarian cancer Paternal Grandmother 70    Diabetes Paternal Grandfather     Cancer Paternal Grandfather        Review of Systems:  Review of Systems   HENT:  Positive for ear discharge. Negative for rhinorrhea.    Respiratory:  Negative for cough.    Gastrointestinal:  Positive for diarrhea.   Skin:  Positive for rash.   Neurological:  Negative for headaches.     Vital Signs:   /96   Pulse 76   Temp 99.1 °F (37.3 °C) (Infrared)   Ht 170.2 cm (67.01\")   Wt (!) 165 kg (363 lb 9.6 oz)   BMI 56.93 kg/m²      Physical Exam:  Physical Exam  Constitutional:       Appearance: Normal appearance.   HENT:      Right Ear: Tympanic membrane normal. Drainage present. No middle ear effusion.      Left Ear: Tympanic membrane normal. Drainage present.  No middle ear effusion.   Cardiovascular:      Rate and Rhythm: Normal rate.      Pulses: Normal pulses.   Pulmonary:      Effort: Pulmonary effort is normal.      Breath sounds: Normal breath sounds.   Skin:     Capillary Refill: Capillary refill takes less than 2 seconds.   Neurological:      General: No focal deficit present.      Mental Status: She is alert.   Psychiatric:         " Mood and Affect: Mood normal.         Thought Content: Thought content normal.             PHQ-9 Score  PHQ-9 Total Score:       Lab Review  No visits with results within 3 Month(s) from this visit.   Latest known visit with results is:   Admission on 01/08/2023, Discharged on 01/08/2023   Component Date Value Ref Range Status    COVID19 01/08/2023 Not Detected   Final    Influenza A Antigen COREY 01/08/2023 Not Detected   Final    Influenza B Antigen COREY 01/08/2023 Not Detected   Final    Internal Control 01/08/2023 Passed   Final    Lot Number 01/08/2023 [id number]   Final    Expiration Date 01/08/2023 11/12/2023   Final    Rapid Strep A Screen 01/08/2023 Negative   Final    Internal Control 01/08/2023 Passed   Final    Lot Number 01/08/2023 [id number]   Final    Expiration Date 01/08/2023 4/11/2024   Final    Wound Culture 01/08/2023 Moderate growth (3+) Normal Oral Kierra   Final    Gram Stain 01/08/2023 No WBCs seen   Final    Gram Stain 01/08/2023 Many (4+) Mixed bacterial morphotypes seen on Gram Stain   Final        Radiology Results        Assessment / Plan         Assessment and Plan   Diagnoses and all orders for this visit:    1. Other infective acute otitis externa of both ears (Primary)    Otitis externa noted on exam.   Rx Ciprodex   Discussed avoiding water in the ear.     Discussed possible differential diagnoses, testing, treatment, recommended non-pharmacological interventions, risks, warning signs to monitor for that would indicate need for follow-up in clinic or ER. If no improvement with these regimens or you have new or worsening symptoms follow-up. Patient verbalizes understanding and agreement with plan of care. Denies further needs or concerns.     Patient was given instructions and counseling regarding her condition and for health maintenance advised.            Health Maintenance  Health Maintenance:   Health Maintenance Due   Topic Date Due    Hepatitis B (1 of 3 - 3-dose series) Never done     ANNUAL PHYSICAL  Never done    PAP SMEAR  08/01/2022        Meds ordered during this visit  No orders of the defined types were placed in this encounter.      Meds stopped during this visit:  Medications Discontinued During This Encounter   Medication Reason    chlorhexidine (PERIDEX) 0.12 % solution *Therapy completed    fluticasone (FLONASE) 50 MCG/ACT nasal spray *Therapy completed    loratadine (Claritin) 10 MG tablet *Therapy completed        Visit Diagnoses    ICD-10-CM ICD-9-CM   1. Other infective acute otitis externa of both ears  H60.393 380.10       Patient was given instructions and counseling regarding her condition or for health maintenance advice. Please see specific information pulled into the AVS if appropriate.     Follow Up   No follow-ups on file.          This document has been electronically signed by Elyssa Orellana DO   July 27, 2023 12:04 EDT    Dictated Utilizing Dragon Dictation: Part of this note may be an electronic transcription/translation of spoken language to printed text using the Dragon Dictation System.    Elyssa Orellana D.O.  [facility] [place] [facility] [place]

## 2023-08-28 ENCOUNTER — OFFICE VISIT (OUTPATIENT)
Dept: FAMILY MEDICINE CLINIC | Facility: CLINIC | Age: 38
End: 2023-08-28
Payer: COMMERCIAL

## 2023-08-28 VITALS
DIASTOLIC BLOOD PRESSURE: 78 MMHG | SYSTOLIC BLOOD PRESSURE: 129 MMHG | TEMPERATURE: 98.4 F | HEIGHT: 67 IN | BODY MASS INDEX: 45.99 KG/M2 | HEART RATE: 88 BPM | WEIGHT: 293 LBS | OXYGEN SATURATION: 97 %

## 2023-08-28 DIAGNOSIS — J01.00 ACUTE NON-RECURRENT MAXILLARY SINUSITIS: Primary | ICD-10-CM

## 2023-08-28 PROCEDURE — 99213 OFFICE O/P EST LOW 20 MIN: CPT | Performed by: NURSE PRACTITIONER

## 2023-08-28 RX ORDER — AMOXICILLIN AND CLAVULANATE POTASSIUM 875; 125 MG/1; MG/1
1 TABLET, FILM COATED ORAL 2 TIMES DAILY
Qty: 20 TABLET | Refills: 0 | Status: SHIPPED | OUTPATIENT
Start: 2023-08-28 | End: 2023-09-07

## 2023-08-28 RX ORDER — TRIAMCINOLONE ACETONIDE 55 UG/1
2 SPRAY, METERED NASAL DAILY
Qty: 16.5 G | Refills: 0 | Status: SHIPPED | OUTPATIENT
Start: 2023-08-28

## 2023-08-28 NOTE — PROGRESS NOTES
Follow Up Office Note     Patient Name: Maren Ray  : 1985   MRN: [id number]     Chief Complaint:    Chief Complaint   Patient presents with    Sore Throat    Headache     Per patient she has sinus headache , facial tenderness under her eyes and teeth hurt from sinus pressure. Patient wants to speak to provider before any testing done- states her son had same exact thing and tested negative for every test.        History of Present Illness: Maren Ray is a 38 y.o. female who presents today with c/o HA, sinus congestion, postnasal drainage, sore throat x several days. Reports sx worsening. Patient denies fever, chills, body aches.      Subjective      I have reviewed and the following portions of the patient's history were updated as appropriate: past family history, past medical history, past social history, past surgical history and problem list.    Review of Systems:   Review of Systems   Constitutional:  Positive for fatigue. Negative for activity change, appetite change, chills, diaphoresis and fever.   HENT:  Positive for congestion, postnasal drip, sinus pressure, sinus pain and sore throat. Negative for ear discharge, ear pain and trouble swallowing.    Respiratory:  Negative for cough and chest tightness.    Cardiovascular: Negative.    Gastrointestinal: Negative.    Musculoskeletal:  Negative for myalgias.   Skin:  Negative for rash.   Neurological:  Positive for headaches. Negative for dizziness.      Past Medical History:   Past Medical History:   Diagnosis Date    Acid reflux     Allergic     Anxiety     Arthritis     Bronchitis     Depression     Fibromyalgia     History of ear infections     Hypotension     Hypotension     Migraines     Sinusitis     Sleep apnea     Urinary tract infection     Vaginal delivery 2017         Medications:     Current Outpatient Medications:     Ibuprofen 200 MG capsule, Take 4 capsules by mouth Every 6 (Six) Hours., Disp: , Rfl:      "sertraline (Zoloft) 50 MG tablet, Take 1 tablet by mouth Daily. 1/2 tablet PO QD x 2 weeks, then increase to whole tablet daily, Disp: 30 tablet, Rfl: 2    amoxicillin-clavulanate (AUGMENTIN) 875-125 MG per tablet, Take 1 tablet by mouth 2 (Two) Times a Day for 10 days., Disp: 20 tablet, Rfl: 0    Triamcinolone Acetonide (NASACORT) 55 MCG/ACT nasal inhaler, 2 sprays into the nostril(s) as directed by provider Daily., Disp: 16.5 g, Rfl: 0    Allergies:   Allergies   Allergen Reactions    Sulfa Antibiotics Rash and Angioedema         Objective     Physical Exam:  Vital Signs:   Vitals:    08/28/23 1046   BP: 129/78   BP Location: Right arm   Patient Position: Sitting   Cuff Size: Large Adult   Pulse: 88   Temp: 98.4 øF (36.9 øC)   TempSrc: Infrared   SpO2: 97%   Weight: (!) 165 kg (364 lb 9.6 oz)   Height: 170.2 cm (67.01\")   PainSc: 0-No pain     Body mass index is 57.09 kg/mý.     Physical Exam  Vitals and nursing note reviewed.   Constitutional:       General: She is not in acute distress.     Appearance: Normal appearance. She is well-developed. She is not ill-appearing, toxic-appearing or diaphoretic.   HENT:      Head: Normocephalic and atraumatic.      Right Ear: External ear normal. A middle ear effusion is present. Tympanic membrane is injected and bulging.      Left Ear: External ear normal. A middle ear effusion is present. Tympanic membrane is injected and bulging.      Nose: Mucosal edema and congestion present.      Right Turbinates: Swollen.      Left Turbinates: Swollen.      Right Sinus: Maxillary sinus tenderness present.      Left Sinus: Maxillary sinus tenderness present.      Mouth/Throat:      Lips: Pink.      Mouth: Mucous membranes are moist.      Pharynx: Uvula midline. Posterior oropharyngeal erythema (moderate with cobblestoning) present.   Cardiovascular:      Rate and Rhythm: Normal rate and regular rhythm.      Heart sounds: Normal heart sounds.   Pulmonary:      Effort: Pulmonary effort " is normal. No respiratory distress.      Breath sounds: Normal breath sounds. No stridor. No wheezing.   Musculoskeletal:      Cervical back: Normal range of motion and neck supple. No rigidity. No muscular tenderness.   Lymphadenopathy:      Cervical: No cervical adenopathy.   Skin:     General: Skin is warm and dry.   Neurological:      Mental Status: She is alert and oriented to person, place, and time.   Psychiatric:         Mood and Affect: Mood normal.         Behavior: Behavior normal. Behavior is cooperative.         Thought Content: Thought content normal.         Judgment: Judgment normal.       Assessment / Plan      Assessment/Plan:   Diagnoses and all orders for this visit:    1. Acute non-recurrent maxillary sinusitis (Primary)  -     amoxicillin-clavulanate (AUGMENTIN) 875-125 MG per tablet; Take 1 tablet by mouth 2 (Two) Times a Day for 10 days.  Dispense: 20 tablet; Refill: 0  -     Triamcinolone Acetonide (NASACORT) 55 MCG/ACT nasal inhaler; 2 sprays into the nostril(s) as directed by provider Daily.  Dispense: 16.5 g; Refill: 0       Follow Up:   PRN and at next scheduled appointment(s) with PCP.    Discussed the nature of the medical condition(s) risks, complications, implications, management, safe and proper use of medications. Encouraged medication compliance, and keeping scheduled follow up appointments with me and any other providers.      RTC if symptoms fail to improve, to ER if symptoms worsen.        *Dictated Utilizing Dragon Dictation   Please note that portions of this note were completed with a voice recognition program.   Part of this note may be an electronic transcription/translation of spoken language to printed text using the Dragon Dictation System. Spelling and/or grammatical errors may exist despite efforts at proofreading.      NOTE TO PATIENT: The 21st Century Cures Act makes medical notes like these available to patients in the interest of transparency. However, be advised  this is a medical document. It is intended as peer to peer communication. It is written in medical language and may contain abbreviations or verbiage that are unfamiliar. It may appear blunt or direct. Medical documents are intended to carry relevant information, facts as evident, and the clinical opinion of the practitioner.      DARRIUS Harvey  [facility] [place]

## 2023-09-23 DIAGNOSIS — F32.A DEPRESSION, UNSPECIFIED DEPRESSION TYPE: ICD-10-CM

## 2023-09-23 DIAGNOSIS — F41.9 ANXIETY: ICD-10-CM

## 2023-09-24 DIAGNOSIS — J01.00 ACUTE NON-RECURRENT MAXILLARY SINUSITIS: ICD-10-CM

## 2023-09-25 RX ORDER — TRIAMCINOLONE ACETONIDE 55 UG/1
SPRAY, METERED NASAL
Qty: 16.9 EACH | Refills: 0 | Status: SHIPPED | OUTPATIENT
Start: 2023-09-25

## 2024-01-03 DIAGNOSIS — F32.A DEPRESSION, UNSPECIFIED DEPRESSION TYPE: ICD-10-CM

## 2024-01-03 DIAGNOSIS — F41.9 ANXIETY: ICD-10-CM

## 2024-04-05 DIAGNOSIS — F41.9 ANXIETY: ICD-10-CM

## 2024-04-05 DIAGNOSIS — F32.A DEPRESSION, UNSPECIFIED DEPRESSION TYPE: ICD-10-CM

## 2024-06-22 ENCOUNTER — TELEPHONE (OUTPATIENT)
Dept: URGENT CARE | Facility: CLINIC | Age: 39
End: 2024-06-22

## 2024-07-15 DIAGNOSIS — F41.9 ANXIETY: ICD-10-CM

## 2024-07-15 DIAGNOSIS — F32.A DEPRESSION, UNSPECIFIED DEPRESSION TYPE: ICD-10-CM

## 2024-08-07 ENCOUNTER — OFFICE VISIT (OUTPATIENT)
Dept: FAMILY MEDICINE CLINIC | Facility: CLINIC | Age: 39
End: 2024-08-07
Payer: COMMERCIAL

## 2024-08-07 VITALS
HEIGHT: 67 IN | WEIGHT: 293 LBS | SYSTOLIC BLOOD PRESSURE: 126 MMHG | TEMPERATURE: 98.4 F | DIASTOLIC BLOOD PRESSURE: 84 MMHG | BODY MASS INDEX: 45.99 KG/M2 | OXYGEN SATURATION: 98 % | HEART RATE: 77 BPM

## 2024-08-07 DIAGNOSIS — M25.552 LEFT HIP PAIN: ICD-10-CM

## 2024-08-07 DIAGNOSIS — M54.30 SCIATIC LEG PAIN: Primary | ICD-10-CM

## 2024-08-07 PROCEDURE — 99213 OFFICE O/P EST LOW 20 MIN: CPT | Performed by: FAMILY MEDICINE

## 2024-08-07 RX ORDER — PREDNISONE 20 MG/1
40 TABLET ORAL DAILY
Qty: 10 TABLET | Refills: 0 | Status: SHIPPED | OUTPATIENT
Start: 2024-08-07 | End: 2024-08-12

## 2024-08-07 RX ORDER — CYCLOBENZAPRINE HCL 10 MG
10 TABLET ORAL 3 TIMES DAILY PRN
Qty: 30 TABLET | Refills: 0 | Status: SHIPPED | OUTPATIENT
Start: 2024-08-07 | End: 2024-08-12

## 2024-08-07 RX ORDER — LEVOCETIRIZINE DIHYDROCHLORIDE 5 MG/1
5 TABLET, FILM COATED ORAL EVERY EVENING
COMMUNITY

## 2024-08-07 NOTE — PROGRESS NOTES
"Subjective   Maren Ray is a 38 y.o. female.     History of Present Illness pain started in posterior left hip and radiates down left leg.  2 weeks . She has been doing ibu and heat and cold.  Still hurting she has had sciatica before and bursitis but this feels like sciatica.  No numbness or tingling.  No loss bowel function no fever or chills.  No saddle anesthesia.     She reports that she has had similar pain in the past and of steroid always helped with the symptoms.  She also reports that she is friends with many healthcare providers and they all agree that she just needs a steroid.    The following portions of the patient's history were reviewed and updated as appropriate: allergies, current medications, past family history, past medical history, past social history, past surgical history, and problem list.    Review of Systems    Objective     Vitals:    08/07/24 1122   BP: 126/84   Pulse: 77   Temp: 98.4 °F (36.9 °C)   TempSrc: Infrared   SpO2: 98%   Weight: (!) 169 kg (373 lb)   Height: 170.2 cm (67.01\")       Physical Exam  Vitals and nursing note reviewed.   Constitutional:       General: She is not in acute distress.     Appearance: She is well-developed. She is obese. She is not diaphoretic.   HENT:      Head: Normocephalic and atraumatic.      Right Ear: External ear normal.      Left Ear: External ear normal.   Eyes:      General: Lids are normal.   Neck:      Thyroid: No thyroid mass or thyromegaly.      Vascular: No JVD.      Trachea: No tracheal deviation.   Cardiovascular:      Rate and Rhythm: Normal rate and regular rhythm.      Heart sounds: Normal heart sounds.   Pulmonary:      Effort: Pulmonary effort is normal.      Breath sounds: Normal breath sounds. No decreased breath sounds.   Musculoskeletal:         General: Tenderness present. Normal range of motion.      Comments: She appears uncomfortable in the chair.  She has some tenderness at the lower lumbar sacral joint with some " radiating tenderness at the piriformis muscle.  She has normal deep tendon reflexes bilaterally normal dorsi and plantarflexion bilaterally.   Lymphadenopathy:      Upper Body:      Right upper body: No supraclavicular adenopathy.      Left upper body: No supraclavicular adenopathy.   Skin:     Findings: No bruising, erythema or rash.      Nails: There is no clubbing.   Neurological:      Mental Status: She is alert and oriented to person, place, and time.      Cranial Nerves: No cranial nerve deficit.      Deep Tendon Reflexes: Reflexes are normal and symmetric. Reflexes normal.   Psychiatric:         Speech: Speech normal.         Behavior: Behavior normal.         Thought Content: Thought content normal.         Judgment: Judgment normal.         Assessment & Plan     Problem List Items Addressed This Visit          Musculoskeletal and Injuries    Left hip pain    Sciatic leg pain - Primary    Relevant Medications    predniSONE (DELTASONE) 20 MG tablet    cyclobenzaprine (FLEXERIL) 10 MG tablet     If the symptoms not improve let us know and we will get her into see physical therapy and get some imaging of her lower back.  We could consider a nerve conduction test.

## 2024-08-10 ENCOUNTER — APPOINTMENT (OUTPATIENT)
Facility: HOSPITAL | Age: 39
End: 2024-08-10
Payer: COMMERCIAL

## 2024-08-10 ENCOUNTER — HOSPITAL ENCOUNTER (EMERGENCY)
Facility: HOSPITAL | Age: 39
Discharge: HOME | End: 2024-08-10
Attending: EMERGENCY MEDICINE
Payer: COMMERCIAL

## 2024-08-10 ENCOUNTER — DOCUMENTATION (OUTPATIENT)
Dept: FAMILY MEDICINE CLINIC | Facility: CLINIC | Age: 39
End: 2024-08-10
Payer: COMMERCIAL

## 2024-08-10 VITALS
SYSTOLIC BLOOD PRESSURE: 144 MMHG | RESPIRATION RATE: 18 BRPM | DIASTOLIC BLOOD PRESSURE: 84 MMHG | HEART RATE: 85 BPM | TEMPERATURE: 97.6 F | BODY MASS INDEX: 47.09 KG/M2 | OXYGEN SATURATION: 96 % | HEIGHT: 66 IN | WEIGHT: 293 LBS

## 2024-08-10 DIAGNOSIS — M48.00 CENTRAL STENOSIS OF SPINAL CANAL: Primary | ICD-10-CM

## 2024-08-10 PROCEDURE — 99284 EMERGENCY DEPT VISIT MOD MDM: CPT

## 2024-08-10 PROCEDURE — 72131 CT LUMBAR SPINE W/O DYE: CPT

## 2024-08-10 PROCEDURE — 63710000001 PREDNISONE PER 1 MG: Performed by: PHYSICIAN ASSISTANT

## 2024-08-10 RX ORDER — METHOCARBAMOL 750 MG/1
750 TABLET, FILM COATED ORAL 3 TIMES DAILY
Qty: 15 TABLET | Refills: 0 | Status: SHIPPED | OUTPATIENT
Start: 2024-08-10 | End: 2024-08-16 | Stop reason: SDUPTHER

## 2024-08-10 RX ORDER — LIDOCAINE 4 G/G
1 PATCH TOPICAL
Status: DISCONTINUED | OUTPATIENT
Start: 2024-08-10 | End: 2024-08-10 | Stop reason: HOSPADM

## 2024-08-10 RX ORDER — HYDROCODONE BITARTRATE AND ACETAMINOPHEN 7.5; 325 MG/1; MG/1
1 TABLET ORAL ONCE
Status: COMPLETED | OUTPATIENT
Start: 2024-08-10 | End: 2024-08-10

## 2024-08-10 RX ORDER — KETOROLAC TROMETHAMINE 30 MG/ML
30 INJECTION, SOLUTION INTRAMUSCULAR; INTRAVENOUS EVERY 6 HOURS PRN
Status: DISCONTINUED | OUTPATIENT
Start: 2024-08-10 | End: 2024-08-10 | Stop reason: HOSPADM

## 2024-08-10 RX ORDER — METHOCARBAMOL 500 MG/1
750 TABLET, FILM COATED ORAL ONCE
Status: COMPLETED | OUTPATIENT
Start: 2024-08-10 | End: 2024-08-10

## 2024-08-10 RX ORDER — PREDNISONE 50 MG/1
50 TABLET ORAL DAILY
Qty: 6 TABLET | Refills: 0 | Status: SHIPPED | OUTPATIENT
Start: 2024-08-10

## 2024-08-10 RX ORDER — LIDOCAINE 50 MG/G
1 PATCH TOPICAL EVERY 24 HOURS
Qty: 14 PATCH | Refills: 0 | Status: SHIPPED | OUTPATIENT
Start: 2024-08-10 | End: 2024-08-12

## 2024-08-10 RX ORDER — PREDNISONE 20 MG/1
60 TABLET ORAL ONCE
Status: COMPLETED | OUTPATIENT
Start: 2024-08-10 | End: 2024-08-10

## 2024-08-10 RX ADMIN — HYDROCODONE BITARTRATE AND ACETAMINOPHEN 1 TABLET: 7.5; 325 TABLET ORAL at 13:52

## 2024-08-10 RX ADMIN — LIDOCAINE PAIN RELIEF 1 PATCH: 560 PATCH TOPICAL at 13:52

## 2024-08-10 RX ADMIN — PREDNISONE 60 MG: 20 TABLET ORAL at 14:53

## 2024-08-10 RX ADMIN — METHOCARBAMOL 750 MG: 500 TABLET ORAL at 13:52

## 2024-08-10 NOTE — DISCHARGE INSTRUCTIONS
Follow-up with PCP for recheck of symptoms as you likely require further evaluation with MRI as well as physical therapy.  Return the emergency department for new, worsening, concerning symptoms.  Referral for neurosurgery for follow-up of back pain.  Have low threshold to return the emergency department for leg weakness, numbness in groin, bowel or bladder changes, new, worsening, concerning symptoms.

## 2024-08-10 NOTE — FSED PROVIDER NOTE
Subjective  History of Present Illness:    Patient is a 39-year-old female with a medical history of fibromyalgia, sciatica pain presents to the emergency department for evaluation of left low back pain that radiates down the left lower extremity concerning for sciatica pain.  Patient states symptoms have been worse over the past 2 weeks.  Patient denies trauma or heavy lifting.  Patient denies fever, IV drug use, history of malignancy, lower extremity weakness, saddle anesthesia, paresthesias, bowel or bladder dysfunction.  Patient was seen by PCP who gave her steroid but her symptoms continued prompting her to come to the emergency department today.  Patient is able to ambulate to the ER.      Nurses Notes reviewed and agree, including vitals, allergies, social history and prior medical history.     REVIEW OF SYSTEMS: All systems reviewed and not pertinent unless noted.  Review of Systems   Constitutional:  Negative for chills and fever.   HENT:  Negative for ear pain, sore throat and trouble swallowing.    Eyes:  Negative for pain and visual disturbance.   Respiratory:  Negative for cough, shortness of breath and wheezing.    Cardiovascular:  Negative for chest pain, palpitations and leg swelling.   Gastrointestinal:  Negative for abdominal distention, abdominal pain, blood in stool, constipation, diarrhea, nausea and vomiting.   Endocrine: Negative for cold intolerance and heat intolerance.   Genitourinary:  Negative for dysuria, flank pain, frequency, urgency, vaginal bleeding and vaginal discharge.   Musculoskeletal:  Positive for back pain. Negative for gait problem, neck pain and neck stiffness.   Skin:  Negative for rash and wound.   Allergic/Immunologic: Negative for immunocompromised state.   Neurological:  Negative for dizziness, syncope, speech difficulty, weakness, light-headedness, numbness and headaches.   Hematological:  Does not bruise/bleed easily.   Psychiatric/Behavioral:  Negative for  "agitation, confusion and suicidal ideas. The patient is not nervous/anxious.    All other systems reviewed and are negative.      Past Medical History:   Diagnosis Date    Acid reflux     Allergic     Anxiety     Arthritis     Bronchitis     Depression     Fibromyalgia     History of ear infections     Hypotension     Hypotension     Migraines     Sinusitis     Sleep apnea     Urinary tract infection     Vaginal delivery 04/21/2017       Allergies:    Sulfa antibiotics      Past Surgical History:   Procedure Laterality Date    MOUTH SURGERY      WISDOM TOOTH EXTRACTION           Social History     Socioeconomic History    Marital status:      Spouse name: Rudy   Tobacco Use    Smoking status: Never     Passive exposure: Never    Smokeless tobacco: Never   Vaping Use    Vaping status: Never Used   Substance and Sexual Activity    Alcohol use: Not Currently    Drug use: No    Sexual activity: Not Currently         Family History   Problem Relation Age of Onset    Chronic bronchitis Mother     Stroke Mother     Hypotension Mother     Restless legs syndrome Mother     Sleep apnea Father     Obesity Father     Cancer Father     Diabetes Father     Hypertension Father     Obesity Brother     Diabetes Brother     Bipolar disorder Brother     Cancer Paternal Grandmother     Heart disease Paternal Grandmother     Ovarian cancer Paternal Grandmother 70    Diabetes Paternal Grandfather     Cancer Paternal Grandfather        Objective  Physical Exam:  /84 (BP Location: Left arm, Patient Position: Sitting)   Pulse 85   Temp 97.6 °F (36.4 °C) (Oral)   Resp 18   Ht 167.6 cm (66\")   Wt (!) 168 kg (369 lb 8 oz)   LMP 07/20/2024 (Approximate)   SpO2 96%   BMI 59.64 kg/m²      Physical Exam  Vitals and nursing note reviewed.   Constitutional:       General: She is not in acute distress.     Appearance: Normal appearance. She is not toxic-appearing.   HENT:      Head: Normocephalic and atraumatic.      Nose: " Nose normal.      Mouth/Throat:      Mouth: Mucous membranes are moist.   Eyes:      Extraocular Movements: Extraocular movements intact.      Conjunctiva/sclera: Conjunctivae normal.      Pupils: Pupils are equal, round, and reactive to light.   Cardiovascular:      Rate and Rhythm: Normal rate.   Pulmonary:      Effort: Pulmonary effort is normal.   Abdominal:      General: Abdomen is flat. There is no distension.   Musculoskeletal:         General: No deformity. Normal range of motion.      Cervical back: Normal range of motion and neck supple.      Right lower leg: No edema.      Left lower leg: No edema.   Skin:     General: Skin is warm and dry.   Neurological:      General: No focal deficit present.      Mental Status: She is alert and oriented to person, place, and time.      Comments: No saddle anesthesia.  5 out of 5 strength bilateral lower extremities.  Normal gait.  Full and equal gross sensation bilateral lower extremities   Psychiatric:         Mood and Affect: Mood normal.         Behavior: Behavior normal.         Procedures    ED Course:         Lab Results (last 24 hours)       ** No results found for the last 24 hours. **             CT Lumbar Spine Without Contrast    Result Date: 8/10/2024  CT LUMBAR SPINE WO CONTRAST Date of Exam: 8/10/2024 2:12 PM EDT Indication: low back pain, left radiculopathy. Comparison: None available. Technique: Axial CT images were obtained of the lumbar spine without contrast administration.  Reconstructed coronal and sagittal images were also obtained. Automated exposure control and iterative construction methods were used. Findings: There is some quantum mottle/photon starvation relating to body habitus. There are 5 lumbar type vertebral bodies. No evidence of acute fracture or loss of vertebral body height. There is mild degenerative disc disease throughout. No bulky facet arthropathy. There are shortened pedicles throughout the lumbar spine compatible with some  component of congenital spinal canal stenosis with likely moderate multilevel spinal canal narrowing. There could be moderate to severe spinal canal stenosis at L4-L5 (series 4 which 46). The paravertebral soft tissues appear unremarkable. No acute or suspicious findings within the partially imaged portions of the posterior abdomen and pelvis. Unremarkable appearance of the partially imaged pelvis.     Impression: Impression: No acute fracture. There is mild degenerative disc disease. There are shortened pedicles suggestive of a component of congenital spinal canal stenosis with probable moderate multilevel spinal canal stenoses, perhaps moderate to severe at L4-L5. Spinal canal and neural foramina would be better evaluated with MRI. Electronically Signed: Nando Williamson MD  8/10/2024 2:54 PM EDT  Workstation ID: [id number]        MDM     Amount and/or Complexity of Data Reviewed  Tests in the radiology section of CPT®: reviewed  Tests in the medicine section of CPT®: reviewed        Initial impression of presenting illness: Patient is a 39-year-old female with medical history of fibromyalgia, sciatica pain presents to the emergency department for left low back pain that radiates to left lower extremity has been present for the past 2 weeks.  Patient denies back red flag symptoms.    Patient arrives afebrile, hemodynamic stable, nontoxic-appearing with vitals interpreted by myself.     Pertinent features from physical exam: 5-5 strength bilateral lower extremity, full and equal gross sensation bilateral lower extremities.  No saddle anesthesia.  Positive straight leg test    DDX: includes but is not limited to: Sciatica pain, herniated disc, back muscle spasm, other    Initial diagnostic plan and interventions: Workup includes CT lumbar spine without contrast.  Interventions include prednisone, Toradol, Norco, methocarbamol, lidocaine patch.    Diagnostic information from other sources: Previous encounters    Results  from initial plan were reviewed and interpreted by me revealing No acute fracture. There is mild degenerative disc disease. There are shortened pedicles suggestive of a component of congenital spinal canal stenosis with probable moderate multilevel spinal canal stenoses, perhaps moderate to severe at L4-L5.     Re-evaluation: Patient does have improved symptoms after interventions but still has pain.  Patient has not had exhibit red flag symptoms associated back pain with benign physical neuroexam.  Patient is given strict return precautions emergency department.  Patient is educated on signs and symptoms to watch for to immediately return to hospital if she develops them.  Patient will be sent home with steroids and given referral for neurosurgery.  Patient instructed to follow closely with PCP as she likely requires physical therapy and MRI for further evaluation.      Medications   ketorolac (TORADOL) injection 30 mg (has no administration in time range)   Lidocaine 4 % 1 patch (1 patch Transdermal Medication Applied 8/10/24 1352)   methocarbamol (ROBAXIN) tablet 750 mg (750 mg Oral Given 8/10/24 1352)   HYDROcodone-acetaminophen (NORCO) 7.5-325 MG per tablet 1 tablet (1 tablet Oral Given 8/10/24 1352)   predniSONE (DELTASONE) tablet 60 mg (60 mg Oral Given 8/10/24 1453)       Results/clinical rationale were discussed with patient      Data interpreted: Nursing notes reviewed, vital signs reviewed.  Labs independently interpreted by me.  Imaging independently interpreted by me.  EKG independently interpreted by me.     Counseling: Discussed the results above with the patient regarding need for admission or discharge.  Patient understands and agrees plan of care.      -----  ED Disposition       None          Final diagnoses:   None     Your Follow-Up Providers    Follow-up information has not been specified.       Contact information for after-discharge care    Follow-up information has not been specified.           Your medication list        ASK your doctor about these medications        Instructions Last Dose Given Next Dose Due   Aleve 220 MG tablet  Generic drug: naproxen sodium      Take 2 tablets by mouth 2 (Two) Times a Day As Needed for Mild Pain.       cyclobenzaprine 10 MG tablet  Commonly known as: FLEXERIL      Take 1 tablet by mouth 3 (Three) Times a Day As Needed for Muscle Spasms.       ibuprofen 400 MG tablet  Commonly known as: ADVIL,MOTRIN      Take 1 tablet every 4 hours by oral route.       predniSONE 20 MG tablet  Commonly known as: DELTASONE      Take 2 tablets by mouth Daily.       sertraline 50 MG tablet  Commonly known as: ZOLOFT      TAKE 1 TABLET BY MOUTH DAILY. 1/2 TABLET BY MOUTH DAILY X 2 WEEKS, THEN INCREASE TO WHOLE TABLET DAILY       Xyzal Allergy 24HR 5 MG tablet  Generic drug: levocetirizine      Take 1 tablet by mouth Every Evening.       ZANTAC 75 PO      Take 1 capsule by mouth Daily.

## 2024-08-10 NOTE — PROGRESS NOTES
Patient contact call services on 8/9/24 at 5:23 pm due to continued back pain despite two days of steroids and muscle relaxants - I advised that she should continue current medications as it can take a few days for these to improve symptoms. If she started having worsened pain, numbness/tingling in lower extremities, bowel/bladder incontinence, saddle anesthesia or any other reg flag symptoms she is to report to the ER for evaluation.

## 2024-08-12 ENCOUNTER — OFFICE VISIT (OUTPATIENT)
Dept: FAMILY MEDICINE CLINIC | Facility: CLINIC | Age: 39
End: 2024-08-12
Payer: COMMERCIAL

## 2024-08-12 VITALS
RESPIRATION RATE: 12 BRPM | HEART RATE: 87 BPM | OXYGEN SATURATION: 98 % | BODY MASS INDEX: 47.09 KG/M2 | HEIGHT: 66 IN | TEMPERATURE: 98.2 F | SYSTOLIC BLOOD PRESSURE: 130 MMHG | DIASTOLIC BLOOD PRESSURE: 76 MMHG | WEIGHT: 293 LBS

## 2024-08-12 DIAGNOSIS — Z12.31 BREAST CANCER SCREENING BY MAMMOGRAM: ICD-10-CM

## 2024-08-12 DIAGNOSIS — E66.01 MORBID OBESITY WITH BMI OF 50.0-59.9, ADULT: ICD-10-CM

## 2024-08-12 DIAGNOSIS — M54.30 SCIATIC LEG PAIN: ICD-10-CM

## 2024-08-12 DIAGNOSIS — M54.16 LUMBAR BACK PAIN WITH RADICULOPATHY AFFECTING LEFT LOWER EXTREMITY: Primary | ICD-10-CM

## 2024-08-12 DIAGNOSIS — F32.A DEPRESSION, UNSPECIFIED DEPRESSION TYPE: ICD-10-CM

## 2024-08-12 DIAGNOSIS — F41.9 ANXIETY: ICD-10-CM

## 2024-08-12 PROCEDURE — 99214 OFFICE O/P EST MOD 30 MIN: CPT | Performed by: FAMILY MEDICINE

## 2024-08-12 RX ORDER — TRAMADOL HYDROCHLORIDE 50 MG/1
50 TABLET ORAL EVERY 6 HOURS PRN
Qty: 20 TABLET | Refills: 0 | Status: SHIPPED | OUTPATIENT
Start: 2024-08-12 | End: 2024-08-16 | Stop reason: SDUPTHER

## 2024-08-12 RX ORDER — LIDOCAINE HYDROCHLORIDE 40 MG/ML
SOLUTION TOPICAL AS NEEDED
COMMUNITY

## 2024-08-12 NOTE — PROGRESS NOTES
Subjective   Maren Ray is a 39 y.o. female.     History of Present Illness   History of Present Illness pain started in posterior left hip and radiates down left leg.  2 weeks . She has been doing ibu and heat and cold.  Still hurting she has had sciatica before and bursitis but this feels like sciatica.  No numbness or tingling.  No loss bowel function no fever or chills.  No saddle anesthesia.      She reports that she has had similar pain in the past and of steroid always helped with the symptoms.  She also reports that she is friends with many healthcare providers and they all agree that she just needs a steroid.    Her symptoms did not improve so she went to the ER at [facility] emergency room in [place].  She had a CT scan that was consistent with mild degenerative disc disease but may be a component of a congenital spinal stenosis with moderate multilevel spinal at L4-L5.  A MRI was recommended    She was prescribed a additional 5 days of 50 mg of prednisone.  We reviewed the results of the CT scan.  She reports that she only had some tingling on straight leg raise in the ER.  She continues to have pain she does not have a history of having seizures.  No loss of bowel or bladder function no fevers or chills.  No weakness.  The following portions of the patient's history were reviewed and updated as appropriate: allergies, current medications, past family history, past medical history, past social history, past surgical history, and problem list.    Review of Systems   Constitutional: Negative.    HENT: Negative.     Eyes: Negative.    Respiratory: Negative.     Cardiovascular: Negative.    Gastrointestinal: Negative.    Endocrine: Negative.    Genitourinary: Negative.    Musculoskeletal:  Positive for back pain.   Skin: Negative.    Allergic/Immunologic: Negative.    Neurological: Negative.    Hematological: Negative.    Psychiatric/Behavioral: Negative.     All other systems reviewed and are  "negative.      Objective     Vitals:    08/12/24 1115   BP: 130/76   BP Location: Right arm   Patient Position: Sitting   Cuff Size: Adult   Pulse: 87   Resp: 12   Temp: 98.2 °F (36.8 °C)   TempSrc: Temporal   SpO2: 98%   Weight: (!) 167 kg (367 lb 12.8 oz)   Height: 167.6 cm (66\")       Physical Exam  Vitals and nursing note reviewed.   Constitutional:       Appearance: She is well-developed.   HENT:      Head: Normocephalic and atraumatic.   Eyes:      General:         Right eye: No discharge.         Left eye: No discharge.      Pupils: Pupils are equal, round, and reactive to light.   Cardiovascular:      Rate and Rhythm: Normal rate and regular rhythm.      Heart sounds: Normal heart sounds.   Pulmonary:      Effort: Pulmonary effort is normal.      Breath sounds: Normal breath sounds.   Abdominal:      General: Bowel sounds are normal.      Palpations: Abdomen is soft. There is no mass.      Tenderness: There is no abdominal tenderness.   Musculoskeletal:         General: Normal range of motion.      Right shoulder: No swelling.      Cervical back: Normal range of motion and neck supple.   Skin:     General: Skin is warm and dry.      Nails: There is no clubbing.   Neurological:      General: No focal deficit present.      Mental Status: She is alert and oriented to person, place, and time.      Motor: No weakness.      Coordination: Coordination normal.      Deep Tendon Reflexes: Reflexes are normal and symmetric.   Psychiatric:         Behavior: Behavior normal.         Thought Content: Thought content normal.         Judgment: Judgment normal.         Assessment & Plan     Problem List Items Addressed This Visit          Endocrine and Metabolic    Morbid obesity with BMI of 50.0-59.9, adult    Current Assessment & Plan     Class 3 Severe Obesity (BMI >=40). Obesity-related health conditions include the following: none. Obesity is unchanged. BMI is is above average; BMI management plan is completed. We " discussed portion control and increasing exercise.  .             Genitourinary and Reproductive     Breast cancer screening by mammogram    Relevant Orders    Mammo Screening Digital Tomosynthesis Bilateral With CAD       Musculoskeletal and Injuries    Sciatic leg pain    Relevant Orders    MRI Lumbar Spine Without Contrast    Ambulatory Referral to Pain Management       Neuro    Lumbar back pain with radiculopathy affecting left lower extremity - Primary    Relevant Medications    traMADol (ULTRAM) 50 MG tablet    Other Relevant Orders    MRI Lumbar Spine Without Contrast    Ambulatory Referral to Pain Management

## 2024-08-12 NOTE — ASSESSMENT & PLAN NOTE
Class 3 Severe Obesity (BMI >=40). Obesity-related health conditions include the following: none. Obesity is unchanged. BMI is is above average; BMI management plan is completed. We discussed portion control and increasing exercise.  .

## 2024-08-14 DIAGNOSIS — F41.9 ANXIETY: ICD-10-CM

## 2024-08-14 DIAGNOSIS — F32.A DEPRESSION, UNSPECIFIED DEPRESSION TYPE: ICD-10-CM

## 2024-08-16 DIAGNOSIS — M54.16 LUMBAR BACK PAIN WITH RADICULOPATHY AFFECTING LEFT LOWER EXTREMITY: ICD-10-CM

## 2024-08-16 NOTE — TELEPHONE ENCOUNTER
Caller: Maren Ray    Relationship: Self    Best call back number: [phone]    Requested Prescriptions:   Requested Prescriptions     Pending Prescriptions Disp Refills    methocarbamol (ROBAXIN) 750 MG tablet 15 tablet 0     Sig: Take 1 tablet by mouth 3 (Three) Times a Day.    traMADol (ULTRAM) 50 MG tablet 20 tablet 0     Sig: Take 1 tablet by mouth Every 6 (Six) Hours As Needed for Moderate Pain.        Pharmacy where request should be sent: [facility]/PHARMACY #[id number] [address] AT [facility] 25 - [phone] [facility] [phone] FX     Last office visit with prescribing clinician: 8/12/2024   Last telemedicine visit with prescribing clinician: Visit date not found   Next office visit with prescribing clinician: Visit date not found     Additional details provided by patient: THE PATIENT HAS ONE PILL LEFT OF ROBAXIN AND ENOUGH TRAMADOL TO LAST UNTIL 08/18/24, SHE WOULD LIKE TO HAVE ENOUGH MEDICATION TO LAST HER UNTIL HER APPOINTMENT WITH PAIN MANAGEMENT ON 08/21/24.    Does the patient have less than a 3 day supply:  [x] Yes  [] No    Would you like a call back once the refill request has been completed: [] Yes [] No    If the office needs to give you a call back, can they leave a voicemail: [] Yes [] No    Ny Hewitt   08/16/24 12:58 EDT

## 2024-08-19 ENCOUNTER — TELEPHONE (OUTPATIENT)
Dept: FAMILY MEDICINE CLINIC | Facility: CLINIC | Age: 39
End: 2024-08-19
Payer: COMMERCIAL

## 2024-08-19 RX ORDER — TRAMADOL HYDROCHLORIDE 50 MG/1
50 TABLET ORAL EVERY 6 HOURS PRN
Qty: 20 TABLET | Refills: 0 | Status: SHIPPED | OUTPATIENT
Start: 2024-08-19 | End: 2024-08-23 | Stop reason: SDUPTHER

## 2024-08-19 RX ORDER — METHOCARBAMOL 750 MG/1
750 TABLET, FILM COATED ORAL 3 TIMES DAILY
Qty: 15 TABLET | Refills: 0 | Status: SHIPPED | OUTPATIENT
Start: 2024-08-19 | End: 2024-08-23 | Stop reason: SDUPTHER

## 2024-08-20 ENCOUNTER — TELEPHONE (OUTPATIENT)
Dept: FAMILY MEDICINE CLINIC | Facility: CLINIC | Age: 39
End: 2024-08-20
Payer: COMMERCIAL

## 2024-08-20 NOTE — TELEPHONE ENCOUNTER
Caller: Maren Ray    Relationship: Self    Best call back number: [phone]     What is the medical concern/diagnosis: SCIATIC NERVE PAIN    What specialty or service is being requested: PAIN MANAGEMENT    Any additional details: PATIENT STATES THAT DR. ROBINS HAS CANCELLED HER ORIGINAL APPOINTMENT AND CANNOT SEE HER TILL 9/16. PATIENT REQUESTS A NEW REFERRAL TO SOMEONE WHO CAN SEE HER SOONER. PATIENT ALSO ADVISES THEY HAD AN MRI DONE TODAY, 8/20/24.

## 2024-08-23 ENCOUNTER — TELEPHONE (OUTPATIENT)
Dept: FAMILY MEDICINE CLINIC | Facility: CLINIC | Age: 39
End: 2024-08-23

## 2024-08-23 ENCOUNTER — TELEPHONE (OUTPATIENT)
Dept: FAMILY MEDICINE CLINIC | Facility: CLINIC | Age: 39
End: 2024-08-23
Payer: COMMERCIAL

## 2024-08-23 DIAGNOSIS — M54.16 LUMBAR BACK PAIN WITH RADICULOPATHY AFFECTING LEFT LOWER EXTREMITY: ICD-10-CM

## 2024-08-23 DIAGNOSIS — M48.061 SPINAL STENOSIS OF LUMBAR REGION, UNSPECIFIED WHETHER NEUROGENIC CLAUDICATION PRESENT: ICD-10-CM

## 2024-08-23 DIAGNOSIS — M54.16 LUMBAR BACK PAIN WITH RADICULOPATHY AFFECTING LEFT LOWER EXTREMITY: Primary | ICD-10-CM

## 2024-08-23 RX ORDER — TRAMADOL HYDROCHLORIDE 50 MG/1
50 TABLET ORAL EVERY 6 HOURS PRN
Qty: 120 TABLET | Refills: 0 | Status: CANCELLED | OUTPATIENT
Start: 2024-08-23 | End: 2024-09-22

## 2024-08-23 RX ORDER — TRAMADOL HYDROCHLORIDE 50 MG/1
50 TABLET ORAL EVERY 6 HOURS PRN
Qty: 120 TABLET | Refills: 0 | Status: SHIPPED | OUTPATIENT
Start: 2024-08-23 | End: 2024-09-22

## 2024-08-23 RX ORDER — TRAMADOL HYDROCHLORIDE 50 MG/1
50 TABLET ORAL EVERY 6 HOURS PRN
Qty: 20 TABLET | Refills: 0 | Status: CANCELLED | OUTPATIENT
Start: 2024-08-23

## 2024-08-23 RX ORDER — METHOCARBAMOL 750 MG/1
750 TABLET, FILM COATED ORAL 3 TIMES DAILY
Qty: 15 TABLET | Refills: 0 | Status: SHIPPED | OUTPATIENT
Start: 2024-08-23

## 2024-08-23 NOTE — TELEPHONE ENCOUNTER
Caller: Maren Ray    Relationship: Self    Best call back number: [phone]    Caller requesting test results: SELF    What test was performed: MRI    When was the test performed: 8/20/24    Where was the test performed: [facility]     Additional notes: REQUESTING RESULTS AND WHAT THE NEXT STEPS ARE. PATIENT IS IN PAIN EVEN WITH TAKING THE MEDICATIONS. PAIN SCALE LEVEL 6-7 WHEN THE MEDICINE IS WORKING, 9-10 WHEN MEDICINE IS NOT WORKING    PLEASE CALL AND ADVISE

## 2024-08-23 NOTE — TELEPHONE ENCOUNTER
Caller: Maren Ray KYLEE    Relationship: Self    Best call back number: [phone]     Requested Prescriptions:   Requested Prescriptions     Pending Prescriptions Disp Refills    traMADol (ULTRAM) 50 MG tablet 20 tablet 0     Sig: Take 1 tablet by mouth Every 6 (Six) Hours As Needed for Moderate Pain.    methocarbamol (ROBAXIN) 750 MG tablet 15 tablet 0     Sig: Take 1 tablet by mouth 3 (Three) Times a Day.        Pharmacy where request should be sent: [facility]/PHARMACY #[id number] [address] AT [facility] 25 - [phone] [facility] [phone] FX     Last office visit with prescribing clinician: 8/12/2024   Last telemedicine visit with prescribing clinician: Visit date not found   Next office visit with prescribing clinician: Visit date not found     Additional details provided by patient: PATIENT HAS LESS THAN 3 DAYS LEFT OF MEDICATION. PATIENT PAIN MANAGEMENT APPOINTMENT WAS RESCHEDULED UNTIL 9/16/24. REQUESTING REFILLS       Does the patient have less than a 3 day supply:  [x] Yes  [] No    Would you like a call back once the refill request has been completed: [] Yes [x] No    If the office needs to give you a call back, can they leave a voicemail: [] Yes [x] No    Elly Rosas MA   08/23/24 09:43 EDT

## 2024-08-23 NOTE — TELEPHONE ENCOUNTER
Caller: Maren Ray    Relationship: Self    Best call back number:      [phone] (Mobile)     Requested Prescriptions:       methocarbamol (ROBAXIN) 750 MG tablet       traMADol (ULTRAM) 50 MG tablet     Pharmacy where request should be sent:     [facility]/PHARMACY #[id number] - [place], [place] - [address] [facility] AT [facility] 25 - [phone] [facility] [phone] FX     Last office visit with prescribing clinician: 8/12/2024   Last telemedicine visit with prescribing clinician: Visit date not found   Next office visit with prescribing clinician: Visit date not found     Additional details provided by patient:     PATIENT STATED SHE WILL BE OUT OF THE METHOCARBAMOL TOMORROW, 8/24, AND SHE WILL BE OUT OF THE TRAMADOL SUNDAY, 8/25    Does the patient have less than a 3 day supply:  [x] Yes  [] No    Would you like a call back once the refill request has been completed: [] Yes [] No    If the office needs to give you a call back, can they leave a voicemail: [] Yes [] No    Ny Fernandez Rep   08/23/24 13:44 EDT

## 2024-08-23 NOTE — TELEPHONE ENCOUNTER
Spoke with patient.  Informed her we do not yet have report from MRI, only films.  Will send referral to physical therapy and neurosurgery as patient states her pain management appointment has been postponed and she has not yet heard from neurosurgery from ER referral on 8/10/2024  Patient states she will work on getting report from imaging facility.

## 2024-08-26 ENCOUNTER — TELEPHONE (OUTPATIENT)
Dept: FAMILY MEDICINE CLINIC | Facility: CLINIC | Age: 39
End: 2024-08-26
Payer: COMMERCIAL

## 2024-08-26 DIAGNOSIS — M48.00 CENTRAL SPINAL STENOSIS: Primary | ICD-10-CM

## 2024-08-26 RX ORDER — METHOCARBAMOL 750 MG/1
750 TABLET, FILM COATED ORAL 3 TIMES DAILY
Qty: 15 TABLET | Refills: 0 | Status: SHIPPED | OUTPATIENT
Start: 2024-08-26

## 2024-08-26 NOTE — TELEPHONE ENCOUNTER
Patient notified of providers results message and verbalized understanding. Patient denies any further needs at this time.

## 2024-08-26 NOTE — TELEPHONE ENCOUNTER
Caller: Maren Ray    Relationship: Self    Best call back number: [phone]    Caller requesting test results: PT    What test was performed: MRI    When was the test performed: 8/20/24    Where was the test performed: [facility]    Additional notes: PT WOULD LIKE MRI RESULTS CALLED TO HER AND TO KNOW WHAT THE NEXT STEP IS GOING TO BE

## 2024-08-28 ENCOUNTER — PRIOR AUTHORIZATION (OUTPATIENT)
Dept: FAMILY MEDICINE CLINIC | Facility: CLINIC | Age: 39
End: 2024-08-28
Payer: COMMERCIAL

## 2024-08-28 NOTE — TELEPHONE ENCOUNTER
PA approved    Maren Ray   (Key: [id number])  PA Case ID #: [id number]   Approved, Coverage Starts on: 8/28/2024 12:00:00 AM, Coverage Ends on: 11/26/2024 12:00:00 AM.  Authorization Expiration Date: 11/25/2024  Drug  traMADol HCl 50MG tablets                PA started and sent to bere Ray   (Key: [id number])  PA Case ID #: [id number]  Drug  traMADol HCl 50MG tablets

## 2024-09-04 ENCOUNTER — OFFICE VISIT (OUTPATIENT)
Dept: NEUROSURGERY | Facility: CLINIC | Age: 39
End: 2024-09-04
Payer: COMMERCIAL

## 2024-09-04 VITALS — TEMPERATURE: 96.8 F | BODY MASS INDEX: 47.09 KG/M2 | HEIGHT: 66 IN | WEIGHT: 293 LBS

## 2024-09-04 DIAGNOSIS — M51.36 DDD (DEGENERATIVE DISC DISEASE), LUMBAR: ICD-10-CM

## 2024-09-04 DIAGNOSIS — M54.16 LUMBAR RADICULOPATHY: Primary | ICD-10-CM

## 2024-09-04 PROCEDURE — 99204 OFFICE O/P NEW MOD 45 MIN: CPT | Performed by: NEUROLOGICAL SURGERY

## 2024-09-04 RX ORDER — GABAPENTIN 300 MG/1
CAPSULE ORAL
Qty: 81 CAPSULE | Refills: 0 | Status: SHIPPED | OUTPATIENT
Start: 2024-09-04 | End: 2024-10-04

## 2024-09-04 NOTE — PROGRESS NOTES
Patient: Maren Ray  : 1985    Primary Care Provider: Mimi Bliss MD    Requesting Provider: As above        History    Chief Complaint: Lower extremity pain with sensory alteration.    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  Typically she improves with a little bit of anti-inflammatory or steroid medicine.  In July of this year she developed left hip pain extending down into the back of her left calf and perhaps into the side of her calf a little bit.  She has taken ibuprofen and oral steroids.  This has not been particularly helpful.  She was seen in the emergency room in August.  She has taken tramadol and muscle relaxants.  Some of the symptoms extend down to the left heel.  Her leg symptoms may be a little better at this point.  She has only mild low back pain.  She does have fibromyalgia.  She denies bowel or bladder dysfunction.    Review of Systems   Constitutional:  Negative for activity change, appetite change, chills, diaphoresis, fatigue, fever and unexpected weight change.   HENT:  Negative for congestion, dental problem, drooling, ear discharge, ear pain, facial swelling, hearing loss, mouth sores, nosebleeds, postnasal drip, rhinorrhea, sinus pressure, sinus pain, sneezing, sore throat, tinnitus, trouble swallowing and voice change.    Eyes:  Negative for photophobia, pain, discharge, redness, itching and visual disturbance.   Respiratory:  Negative for apnea, cough, choking, chest tightness, shortness of breath, wheezing and stridor.    Cardiovascular:  Negative for chest pain, palpitations and leg swelling.   Gastrointestinal:  Negative for abdominal distention, abdominal pain, anal bleeding, blood in stool, constipation, diarrhea, nausea, rectal pain and vomiting.   Endocrine: Negative for cold intolerance, heat intolerance, polydipsia, polyphagia and polyuria.   Genitourinary:  Negative for  "decreased urine volume, difficulty urinating, dyspareunia, dysuria, enuresis, flank pain, frequency, genital sores, hematuria, menstrual problem, pelvic pain, urgency, vaginal bleeding, vaginal discharge and vaginal pain.   Musculoskeletal:  Positive for gait problem. Negative for arthralgias, back pain, joint swelling, myalgias, neck pain and neck stiffness.   Skin:  Negative for color change, pallor, rash and wound.   Allergic/Immunologic: Negative for environmental allergies, food allergies and immunocompromised state.   Neurological:  Negative for dizziness, tremors, seizures, syncope, facial asymmetry, speech difficulty, weakness, light-headedness, numbness and headaches.   Hematological:  Negative for adenopathy. Does not bruise/bleed easily.   Psychiatric/Behavioral:  Negative for agitation, behavioral problems, confusion, decreased concentration, dysphoric mood, hallucinations, self-injury, sleep disturbance and suicidal ideas. The patient is not nervous/anxious and is not hyperactive.      The patient's past medical history, past surgical history, family history, and social history have been reviewed at length in the electronic medical record.      Physical Exam:   Ht 167.6 cm (66\")   Wt (!) 166 kg (365 lb)   LMP 07/20/2024 (Approximate)   BMI 58.91 kg/m²   CONSTITUTIONAL: Patient is well-nourished, pleasant and appears stated age.  MUSCULOSKELETAL:  Straight leg raising is moderately positive on the left at 40 degrees.  Mike's Sign is negative.  ROM in the low back is normal.  Tenderness in the back to palpation is not observed.  NEUROLOGICAL:  Orientation, memory, attention span, language function, and cognition have been examined and are intact.  Strength is intact in the lower extremities to direct testing.  Muscle tone is normal throughout.  Station and gait are normal.  Sensation is intact to light touch testing throughout.  Deep tendon reflexes are 2+ and symmetrical except the left ankle " reflex which is absent.  Coordination is intact.      Medical Decision Making    Data Review:   (All imaging studies were personally reviewed unless stated otherwise)  MRI of the lumbar spine dated 8/20/2024 demonstrates some degenerative disc disease and facet arthropathy.  There is moderate canal narrowing at L4-5.  There is some central disc protrusion that narrows both recesses at L5-S1.    Diagnosis:   1.  Left S1 radiculopathy secondary to disc protrusion.  2.  Lumbar degenerative disc disease.  3.  Morbid obesity.    Treatment Options:   The patient has already been scheduled for physical therapy which is due to begin tomorrow.  I have prescribed gabapentin.  She will follow-up in our clinic in several weeks to check on her progress.  If she is not doing better then we might consider an epidural injection.  Surgery would be an option if difficulties persist.      Scribed for Gentry Doherty MD by Martha Perdue MA on 9/4/2024 14:06 EDT      I, Dr. Doherty, personally performed the services described in the documentation, as scribed in my presence, and it is both accurate and complete.

## 2024-09-05 ENCOUNTER — TREATMENT (OUTPATIENT)
Dept: PHYSICAL THERAPY | Facility: CLINIC | Age: 39
End: 2024-09-05
Payer: COMMERCIAL

## 2024-09-05 DIAGNOSIS — G89.29 CHRONIC LEFT-SIDED LOW BACK PAIN WITH LEFT-SIDED SCIATICA: Primary | ICD-10-CM

## 2024-09-05 DIAGNOSIS — M54.42 CHRONIC LEFT-SIDED LOW BACK PAIN WITH LEFT-SIDED SCIATICA: Primary | ICD-10-CM

## 2024-09-05 PROCEDURE — 97110 THERAPEUTIC EXERCISES: CPT | Performed by: PHYSICAL THERAPIST

## 2024-09-05 PROCEDURE — 97162 PT EVAL MOD COMPLEX 30 MIN: CPT | Performed by: PHYSICAL THERAPIST

## 2024-09-05 NOTE — PROGRESS NOTES
Physical Therapy Initial Evaluation and Plan of Care             [address] [facility] [address] [facility] [address]    [place] [address]    Patient: Maren Ray   : 1985  Diagnosis/ICD-10 Code:  Chronic left-sided low back pain with left-sided sciatica [M54.42, G89.29]  Referring practitioner: Sariah Harrington PA-C  Date of Initial Visit: 2024  Today's Date: 2024  Patient seen for 1 session         Visit Diagnoses:    ICD-10-CM ICD-9-CM   1. Chronic left-sided low back pain with left-sided sciatica  M54.42 724.2    G89.29 724.3     338.29         Subjective Questionnaire: Oswestry: 29/50=58% impairment      Subjective Evaluation    History of Present Illness  Mechanism of injury: L leg pn since July. Had to change sleeping position due to ear infection, may have contributed. Min back pn. Pn extends down back of L leg to heel. Associated w/ N/T. Symptoms constant, worsened w/ any prolonged positions, reaching overhead to wash her hair, standing up straight-finds herself leaning over grocery cart or across counter for relief. Denies other alleviating factors-min relief w/ OTC meds. Has had bouts of bursitis, sciatica involving L leg in the past that improved w/ rest, ice, heat. Symptoms severe enough prompted ER visit. Had CT/MRI showing spinal canal narrowing, disc protrusions. Denies bowel/bladder dysfunction, saddle anesthesia, balance difficulty. Saw Dr. Doherty yesterday. Started gabapentin last night.     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   Access Code: [email or web address]  URL: [email or web address]  Date: 09/05/2024  Prepared by: Asya Dong    Exercises  - Static Prone on Elbows  - 2-3 x daily - 5 reps - 30 sec hold  - Supine Lower Trunk Rotation  - 2-3 x daily - 5 reps - 5 sec hold  - Supine Heel Slide with Strap  - 2-3 x daily - 5-10 reps  - Supine Knee Extension Strengthening  - 2-3 x daily - 1-2 sets - 10 reps    Objective          Tenderness     Additional Tenderness Details  Min L lumbar PS, gluteal mm, prox/mid hamstrings    Neurological Testing     Sensation     Lumbar   Left   Intact: light touch    Right   Intact: light touch    Reflexes   Left   Patellar (L4): normal (2+)  Achilles (S1): trace (1+)    Right   Patellar (L4): normal (2+)  Achilles (S1): trace (1+)    Active Range of Motion     Additional Active Range of Motion Details  Trunk Flxn WNL, no inc pn   Trunk Extn inc numbness post thigh; FELICITAS inc tingling in toes  Trunk Rotation WNL, inc post thigh pn w/ L rotation  Trunk SB WNL w/o change    Prone lying/prone on elbows diminished leg symptoms  KTC inc post thigh and calf pn    Strength/Myotome Testing     Left Hip   Planes of Motion   Flexion: 3 (pn post thigh)    Right Hip   Planes of Motion   Flexion: 5    Left Knee   Flexion: 5  Extension: 4    Right Knee   Flexion: 5  Extension: 4+    Left Ankle/Foot   Dorsiflexion: 4  Plantar flexion: 5  Great toe extension: 5    Right Ankle/Foot   Dorsiflexion: 5  Plantar flexion: 5  Great toe extension: 5    Tests       Thoracic   Positive slump (L post thigh pn/tension).     Lumbar     Left   Positive passive SLR.             Assessment & Plan       Assessment  Impairments: abnormal or restricted ROM, activity intolerance, impaired physical strength, lacks appropriate home exercise program and pain with function    Functional limitations: carrying objects, lifting, sleeping, walking, pulling, pushing, uncomfortable because of pain, moving in bed, sitting, standing, stooping and unable to perform repetitive tasks   Assessment details: Pt is a 39 YOF w/ morbid obesity, fibromyalgia, anxiety/depression who presents to PT w/ complaint of chronic back and L leg pn. Findings consistent w/ S1 radiculopathy. Pt's primary impairments include global LLE weakness (pn limited), painful trunk mobility, (+) sciatic neural tension tests. Denies red flag symptoms. Able to diminish leg symptoms w/ prone lying, MINA. Inc leg pn w/ KTC. Pn/deficits limit tolerance to any sustained position, disrupts sleep. Pt would benefit from skilled PT services to address deficits and allow return to full work and daily activities w/o pn or dysfunction.  Barriers to therapy: comorbidities  Prognosis: good    Goals  Plan Goals: STG 3wks  1) Pt to be compliant w/ initial HEP for ROM, stretching, and symptom mgmt.  2) Pt to report pn at rest to be no greater than 5/10.  3) Pt to improve LLE strength to 4/5 or better in all planes.  4) Pt to improve Mod Oswestry score to 20/50 or better to reflect improved pn and function.    LTG 6wks  1) Pt to be independent w/ long term HEP and self mgmt.  2) Pt to report back/leg pn w/ ADLs to be no greater than 2/10.  3) Pt to demo full and pn free trunk mobility in all planes.  4) Pt to improve LLE strength to 4+/5 or better in all planes.  5) Pt to improve Mod Oswestry score to 12/50 or better to reflect improved pn and function.    Plan  Therapy options: will be seen for skilled therapy services  Planned modality interventions: TENS, thermotherapy (hydrocollator packs), traction, ultrasound, cryotherapy and dry needling  Planned therapy interventions: abdominal trunk stabilization, ADL retraining, body mechanics training, flexibility, functional ROM exercises, home exercise program, joint mobilization, manual therapy,  neuromuscular re-education, postural training, soft tissue mobilization, spinal/joint mobilization, strengthening, stretching and therapeutic activities  Frequency: 2x week  Duration in weeks: 12  Treatment plan discussed with: patient  Plan details: PT POC to emphasize restoration of pn free trunk mobility, flexibility, core and LE strength, neurodynamic mobility, appropriate posture and body mechanics w/ lifting/carrying utilizing TE/TA/NMR/MT and modalities as indicated for pn control.        History # of Personal Factors and/or Comorbidities: MODERATE (1-2)  Examination of Body System(s): # of elements: MODERATE (3)  Clinical Presentation: EVOLVING  Clinical Decision Making: MODERATE      Timed:         Manual Therapy:    0     mins  48209;     Therapeutic Exercise:    20     mins  86725;     Neuromuscular Derrick:    0    mins  15768;    Therapeutic Activity:     0     mins  92170;     Gait Trainin     mins  90591;     Ultrasound:     0     mins  78643;    Ionto                               0    mins   15846  Self Care                       0     mins   20717  Canalith Repos    0     mins 57573      Un-Timed:  Electrical Stimulation:    0     mins  00594 (MC );  Dry Needling     0     mins self-pay  Traction     0     mins 82570  Low Eval     0     Mins  46206  Mod Eval     25     Mins  41002  High Eval                       0     Mins  75810        Timed Treatment:   20   mins   Total Treatment:     45   mins          PT: Asya Dong PT     [place] License: #[id number]    Electronically signed by Asya Dong PT, 24, 8:36 AM EDT    Certification Period: 2024 thru 12/3/2024  I certify that the therapy services are furnished while this patient is under my care.  The services outlined above are required by this patient, and will be reviewed every 90 days.         Physician Signature:__________________________________________________    PHYSICIAN: Sariah Harrington PA-C      DATE:     Please  sign and return via fax to [phone]. Thank you, [facility].

## 2024-09-09 ENCOUNTER — TELEPHONE (OUTPATIENT)
Dept: PHYSICAL THERAPY | Facility: CLINIC | Age: 39
End: 2024-09-09
Payer: COMMERCIAL

## 2024-09-09 NOTE — TELEPHONE ENCOUNTER
Caller: Maren Ray    Relationship: Self       What was the call regarding: CX GOT MESSAGE

## 2024-09-10 ENCOUNTER — TELEPHONE (OUTPATIENT)
Dept: NEUROSURGERY | Facility: CLINIC | Age: 39
End: 2024-09-10
Payer: COMMERCIAL

## 2024-09-10 NOTE — TELEPHONE ENCOUNTER
Updated patient, she will keep us updated of any new symptoms or if the lymph nodes do not improve in size or tenderness.

## 2024-09-10 NOTE — TELEPHONE ENCOUNTER
Provider:  Chas  Surgery/Procedure:  KARIE  Surgery/Procedure Date:    Last visit:   9/4/24  Next visit: 10/1/24     Reason for call:  Patient recently started gabapentin. Reports she has a new swollen lymph node directly behind her right ear that is sore to the touch and asked if this might be a side effect or reaction to gabapentin. She is not ill, no rash, is breathing and swallowing fine. Noticed the lymph node about 2 days ago, and significantly larger yesterday.

## 2024-09-12 ENCOUNTER — TREATMENT (OUTPATIENT)
Dept: PHYSICAL THERAPY | Facility: CLINIC | Age: 39
End: 2024-09-12
Payer: COMMERCIAL

## 2024-09-12 DIAGNOSIS — G89.29 CHRONIC LEFT-SIDED LOW BACK PAIN WITH LEFT-SIDED SCIATICA: Primary | ICD-10-CM

## 2024-09-12 DIAGNOSIS — M54.42 CHRONIC LEFT-SIDED LOW BACK PAIN WITH LEFT-SIDED SCIATICA: Primary | ICD-10-CM

## 2024-09-12 PROCEDURE — 97110 THERAPEUTIC EXERCISES: CPT | Performed by: PHYSICAL THERAPIST

## 2024-09-12 PROCEDURE — 97112 NEUROMUSCULAR REEDUCATION: CPT | Performed by: PHYSICAL THERAPIST

## 2024-09-12 RX ORDER — METHOCARBAMOL 750 MG/1
750 TABLET, FILM COATED ORAL 3 TIMES DAILY
Qty: 15 TABLET | Refills: 0 | Status: SHIPPED | OUTPATIENT
Start: 2024-09-12

## 2024-09-12 NOTE — PROGRESS NOTES
Physical Therapy Daily Treatment Note  [address] [facility]  [address]   [place] [address]      Patient: Maren Ray   : 1985  Referring practitioner: No ref. provider found  Date of Initial Visit: Type: THERAPY  Noted: 2024  Today's Date: 2024  Patient seen for 2 sessions       Visit Diagnoses:    ICD-10-CM ICD-9-CM   1. 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 Wears a back brace but when she removes it back feels very fatigued. No pn at the moment, but reports N/T posteromedial thigh and knee, occasionally into heel. Experiencing back spasms. Since starting gabapentin having less sharp pn.    Pre tx pn score: 6  Post tx pn score: 4    Treatment  See Exercise, Manual, and Modality Logs for complete treatment.     Objective         Assessment & Plan       Assessment  Assessment details: Introduced trans ab activation to improve core strength/neuromuscular lumbopelvic control. Difficult to perform at first w/o compensating. Noted onset of paresthesia post thigh after several reps supine knee extn w/ AP's. Pn level dec at end of visit. To add AP to SAQ for inc neural excursion, PPT/BKLL to HEP.    Plan  Plan details: Cont w/ neurodynamics, core stab          Timed:         Manual Therapy:    0     mins  74532;     Therapeutic Exercise:    32     mins  62843;     Neuromuscular Derrick:    12    mins  09430;    Therapeutic Activity:     0     mins  22466;     Gait Trainin     mins  52980;     Ultrasound:     0     mins  43391;    Ionto                               0    mins   82747  Self Care                       0     mins   85996  Canalith Repos    0     mins 62597      Un-Timed:  Electrical Stimulation:    0     mins  69871 ( );  Dry Needling     0     mins self-pay  Traction     0     mins  35499      Timed Treatment:   44   mins   Total Treatment:     44   mins    Asya Dong, PT  [place] License: #[id number]

## 2024-09-16 ENCOUNTER — OFFICE VISIT (OUTPATIENT)
Dept: PAIN MEDICINE | Facility: CLINIC | Age: 39
End: 2024-09-16
Payer: COMMERCIAL

## 2024-09-16 VITALS — HEIGHT: 66 IN | WEIGHT: 293 LBS | BODY MASS INDEX: 47.09 KG/M2

## 2024-09-16 DIAGNOSIS — M54.16 LUMBAR RADICULOPATHY: Primary | ICD-10-CM

## 2024-09-16 PROCEDURE — 99204 OFFICE O/P NEW MOD 45 MIN: CPT | Performed by: STUDENT IN AN ORGANIZED HEALTH CARE EDUCATION/TRAINING PROGRAM

## 2024-09-17 ENCOUNTER — TREATMENT (OUTPATIENT)
Dept: PHYSICAL THERAPY | Facility: CLINIC | Age: 39
End: 2024-09-17
Payer: COMMERCIAL

## 2024-09-17 DIAGNOSIS — G89.29 CHRONIC LEFT-SIDED LOW BACK PAIN WITH LEFT-SIDED SCIATICA: Primary | ICD-10-CM

## 2024-09-17 DIAGNOSIS — M54.42 CHRONIC LEFT-SIDED LOW BACK PAIN WITH LEFT-SIDED SCIATICA: Primary | ICD-10-CM

## 2024-09-17 PROCEDURE — 97110 THERAPEUTIC EXERCISES: CPT | Performed by: PHYSICAL THERAPIST

## 2024-09-17 PROCEDURE — 97530 THERAPEUTIC ACTIVITIES: CPT | Performed by: PHYSICAL THERAPIST

## 2024-09-17 PROCEDURE — 97112 NEUROMUSCULAR REEDUCATION: CPT | Performed by: PHYSICAL THERAPIST

## 2024-09-17 RX ORDER — METHOCARBAMOL 750 MG/1
750 TABLET, FILM COATED ORAL 3 TIMES DAILY
Qty: 15 TABLET | Refills: 0 | OUTPATIENT
Start: 2024-09-17

## 2024-09-18 RX ORDER — METHOCARBAMOL 750 MG/1
750 TABLET, FILM COATED ORAL 3 TIMES DAILY
Qty: 15 TABLET | Refills: 0 | Status: SHIPPED | OUTPATIENT
Start: 2024-09-18

## 2024-09-19 ENCOUNTER — TREATMENT (OUTPATIENT)
Dept: PHYSICAL THERAPY | Facility: CLINIC | Age: 39
End: 2024-09-19
Payer: COMMERCIAL

## 2024-09-19 DIAGNOSIS — M54.42 CHRONIC LEFT-SIDED LOW BACK PAIN WITH LEFT-SIDED SCIATICA: Primary | ICD-10-CM

## 2024-09-19 DIAGNOSIS — G89.29 CHRONIC LEFT-SIDED LOW BACK PAIN WITH LEFT-SIDED SCIATICA: Primary | ICD-10-CM

## 2024-09-23 ENCOUNTER — TREATMENT (OUTPATIENT)
Dept: PHYSICAL THERAPY | Facility: CLINIC | Age: 39
End: 2024-09-23
Payer: COMMERCIAL

## 2024-09-23 ENCOUNTER — PATIENT ROUNDING (BHMG ONLY) (OUTPATIENT)
Dept: PAIN MEDICINE | Facility: CLINIC | Age: 39
End: 2024-09-23
Payer: COMMERCIAL

## 2024-09-23 DIAGNOSIS — G89.29 CHRONIC LEFT-SIDED LOW BACK PAIN WITH LEFT-SIDED SCIATICA: Primary | ICD-10-CM

## 2024-09-23 DIAGNOSIS — M54.42 CHRONIC LEFT-SIDED LOW BACK PAIN WITH LEFT-SIDED SCIATICA: Primary | ICD-10-CM

## 2024-09-23 PROCEDURE — 97014 ELECTRIC STIMULATION THERAPY: CPT | Performed by: PHYSICAL THERAPIST

## 2024-09-23 PROCEDURE — 97112 NEUROMUSCULAR REEDUCATION: CPT | Performed by: PHYSICAL THERAPIST

## 2024-09-23 PROCEDURE — 97110 THERAPEUTIC EXERCISES: CPT | Performed by: PHYSICAL THERAPIST

## 2024-09-26 ENCOUNTER — OUTSIDE FACILITY SERVICE (OUTPATIENT)
Dept: PAIN MEDICINE | Facility: CLINIC | Age: 39
End: 2024-09-26
Payer: COMMERCIAL

## 2024-09-26 ENCOUNTER — DOCUMENTATION (OUTPATIENT)
Dept: PAIN MEDICINE | Facility: CLINIC | Age: 39
End: 2024-09-26

## 2024-09-26 PROCEDURE — 62323 NJX INTERLAMINAR LMBR/SAC: CPT | Performed by: STUDENT IN AN ORGANIZED HEALTH CARE EDUCATION/TRAINING PROGRAM

## 2024-09-26 RX ORDER — METHOCARBAMOL 750 MG/1
750 TABLET, FILM COATED ORAL 3 TIMES DAILY
Qty: 15 TABLET | Refills: 0 | Status: SHIPPED | OUTPATIENT
Start: 2024-09-26

## 2024-09-30 ENCOUNTER — TREATMENT (OUTPATIENT)
Dept: PHYSICAL THERAPY | Facility: CLINIC | Age: 39
End: 2024-09-30
Payer: COMMERCIAL

## 2024-09-30 DIAGNOSIS — G89.29 CHRONIC LEFT-SIDED LOW BACK PAIN WITH LEFT-SIDED SCIATICA: Primary | ICD-10-CM

## 2024-09-30 DIAGNOSIS — M54.42 CHRONIC LEFT-SIDED LOW BACK PAIN WITH LEFT-SIDED SCIATICA: Primary | ICD-10-CM

## 2024-09-30 PROCEDURE — 97530 THERAPEUTIC ACTIVITIES: CPT | Performed by: PHYSICAL THERAPIST

## 2024-09-30 PROCEDURE — 97110 THERAPEUTIC EXERCISES: CPT | Performed by: PHYSICAL THERAPIST

## 2024-09-30 PROCEDURE — 97112 NEUROMUSCULAR REEDUCATION: CPT | Performed by: PHYSICAL THERAPIST

## 2024-09-30 NOTE — PROGRESS NOTES
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  She presented to our office when she developed left hip pain extending down into the back of her left calf. Some of the symptoms extend down to the left heel. She does not report much back pain. This has not responded to anti-inflammatory or steroid medicines like her previous sciatica bouts have, but she may have had some modest improvement with tramadol and muscle relaxants. Her leg symptoms may be a little better at this point. She denies right leg symptoms, and bowel and bladder dysfunction.     Ms. Ray has done physical therapy twice a week for about 4 weeks at this point. She also has been taking gabapentin (300 mg three times daily). She telephoned our office about swelling behind her ear upon starting the medication, but this has resolved/was not thought to be related. Of note, she had an epidural steroid injection with Dr. Morales on 2024. Ms. Ray describes profound pain during the injection. She feels that her overall leg pain has improved, but she a new, more consistent numbness in tingling in her left heel that is worse with walking.  She also has a new pain in the right side of her back that she feels may be a muscle spasm.  She has to take frequent breaks to sit, even during showering.     Review of Systems   Constitutional:  Negative for activity change, appetite change, chills, diaphoresis, fatigue, fever and unexpected weight change.   HENT:  Negative for congestion, dental problem, drooling, ear discharge, ear pain, facial swelling, hearing loss, mouth sores, nosebleeds, postnasal drip, rhinorrhea, sinus pressure, sneezing, sore throat, tinnitus, trouble swallowing and voice change.    Eyes:   "Negative for photophobia, pain, discharge, redness, itching and visual disturbance.   Respiratory:  Negative for apnea, cough, choking, chest tightness, shortness of breath, wheezing and stridor.    Cardiovascular:  Negative for chest pain, palpitations and leg swelling.   Gastrointestinal:  Negative for abdominal distention, abdominal pain, anal bleeding, blood in stool, constipation, diarrhea, nausea, rectal pain and vomiting.   Endocrine: Negative for cold intolerance, heat intolerance, polydipsia, polyphagia and polyuria.   Genitourinary:  Negative for decreased urine volume, difficulty urinating, dysuria, enuresis, flank pain, frequency, genital sores, hematuria and urgency.   Musculoskeletal:  Positive for back pain. Negative for arthralgias, gait problem, joint swelling, myalgias, neck pain and neck stiffness.   Skin:  Negative for color change, pallor, rash and wound.   Allergic/Immunologic: Negative for environmental allergies, food allergies and immunocompromised state.   Neurological:  Negative for dizziness, tremors, seizures, syncope, facial asymmetry, speech difficulty, weakness, light-headedness, numbness and headaches.   Hematological:  Negative for adenopathy. Does not bruise/bleed easily.   Psychiatric/Behavioral:  Negative for agitation, behavioral problems, confusion, decreased concentration, dysphoric mood, hallucinations, self-injury, sleep disturbance and suicidal ideas. The patient is not nervous/anxious and is not hyperactive.    All other systems reviewed and are negative.       The patient's past medical history, past surgical history, family history, and social history have been reviewed at length in the electronic medical record.     Physical examination:  Temperature 97 °F (36.1 °C), temperature source Infrared, resp. rate 17, height 167.6 cm (66\"), weight (!) 164 kg (362 lb), not currently breastfeeding.    Constitutional: The patient is well-developed. She is morbidly obese. She is " pleasant and is in no acute distress.     HEENT: normocephalic, atraumatic, sclera clear    Musculoskeletal:   Motor examination does not reveal weakness in the , upper or lower extremities.   Straight leg testing is mildly positive on the left.  Mike's signs are negative.   FROM in lumbar spine.  Minimal tenderness to palpation in the midline spine. Some tenderness in the right paravertebral muscles.     Neurological Exam   A/A/C, speech clear, attention normal, conversant, answers questions appropriately, good historian.  Sensation is intact.  Gait is normal, balance is normal.   No tremors are noted.  Reflexes are intact with the exception of left ankle reflex, which is absent.   Rios is negative. Clonus is negative.     Radiographic Imaging:  For my review MRI of the lumbar spine dated 8/20/2024 demonstrates a broad-based disc bulge at L5-S1 that creates bilateral neuroforaminal narrowing as well as moderate central canal stenosis.  A similar but less profound finding is also noted at L4-L5.  Diffuse degenerative disc disease is noted throughout as well as facet arthropathy.    Medical Decision Making  Diagnoses and all orders for this visit:    1. Lumbar radiculopathy (Primary)    2. DDD (degenerative disc disease), lumbar    It is difficult to elicit if Ms. Ray has had any improvement with therapy and an epidural steroid injection.  Her pain is better, but she is experiencing more consistent numbness.  The epidural steroid injection was quite painful for her, and she mentions that Dr. Morales surmised that the findings in her back could have changed since her last MRI and that he could have hit her nerve.  She and I discussed she may need some more time to see if some of her symptoms improve.  I have advised her on new or worsening symptoms she should make her office aware of the interim.  I am going to give her a longer course of methocarbamol for her right sided back pain as it does seem  consistent with spasms she will follow-up with me in about 4 to 6 weeks to assess progress.    Any copied data from previous notes included in the (1) HPI, (2) PE, (3) MDM and/or assessment and plan has been reviewed and is accurate as of this day.    LEA [facility]     Patient Care Team:  Mimi Bliss MD as PCP - General (Family Medicine)

## 2024-09-30 NOTE — PROGRESS NOTES
Physical Therapy Daily Treatment Note                  [address] [facility] [address]  [place] [address]      Patient: Maren Ray   : 1985  Diagnosis/ICD-10 Code:  Chronic left-sided low back pain with left-sided sciatica [M54.42, G89.29]  Referring practitioner: Sariah Harrington PA-C  Date of Initial Visit: Type: THERAPY  Noted: 2024  Today's Date: 2024  Patient seen for 6 sessions             Subjective   Maren Ray reports: positional pain has improved and nerve pain down the leg has become less. Showering and standing activities are less painful but can tell she is still hesitant. Had epidural done but didn't go as planned. Seeing neurology tomorrow.     Low back pain -  3/10 pre and post treatment    L LE pain -    2/10 pre and post treatment    N/T in left foot-   4/10 pre treatment  3/10 post treatment    Objective   Pt tends to lean laterally to the left with trunk in seated position.   See Exercise, Manual, and Modality Logs for complete treatment.       Assessment/Plan  Able to talk about compensations over time with her body when the pain was elevated. Went over transitional movements such as sit to stand and side stepping to improve strength but also to re-educate her body. Slight decrease in left foot N/T post treatment. Pt sees neurology tomorrow and may benefit from weight management. Updated HEP to complete seated activities instead of supine.     Access Code: [email or web address]  URL: [email or web address]  Date: 2024  Prepared by: Asya Villafuerte    Exercises  - Seated Isometric Hip Adduction with Pelvic Floor Contraction  - 1 x daily - 7 x weekly - 1 sets - 10 reps - 5 sec hold  - Seated Hip Abduction with Resistance  - 1 x daily - 7 x weekly - 1 sets - 15 reps  - Seated Long Arc Quad  - 2 x daily - 7 x weekly - 1 sets - 5 reps  Progress per Plan of Care and Progress strengthening /stabilization /functional activity            Timed:  Manual Therapy:         mins  91687;  Therapeutic Exercise:    10     mins  33974;     Neuromuscular Derrick:   23    mins  40800;    Therapeutic Activity:     10     mins  83798;     Gait Training:           mins  58654;     Ultrasound:          mins  68063;    Electrical Stimulation:         mins  57997;  Iontophoresis          mins  98160;  Work Conditioning 1-2 hr ___ mins 85426;  Work Conditioning ea additional hr ___ mins 95580    Untimed:  Electrical Stimulation:         mins  51834 ( );  Mechanical Traction:         mins  45231;   Fluidotherapy          mins  07881    Timed Treatment:   43   mins   Total Treatment:     43   mins        Asya Villafuerte PTA  Physical Therapist Assistant

## 2024-10-01 ENCOUNTER — OFFICE VISIT (OUTPATIENT)
Dept: NEUROSURGERY | Facility: CLINIC | Age: 39
End: 2024-10-01
Payer: COMMERCIAL

## 2024-10-01 VITALS — BODY MASS INDEX: 47.09 KG/M2 | RESPIRATION RATE: 17 BRPM | HEIGHT: 66 IN | WEIGHT: 293 LBS | TEMPERATURE: 97 F

## 2024-10-01 DIAGNOSIS — M51.369 DDD (DEGENERATIVE DISC DISEASE), LUMBAR: ICD-10-CM

## 2024-10-01 DIAGNOSIS — M54.16 LUMBAR RADICULOPATHY: Primary | ICD-10-CM

## 2024-10-01 PROCEDURE — 99214 OFFICE O/P EST MOD 30 MIN: CPT

## 2024-10-01 RX ORDER — METHOCARBAMOL 750 MG/1
750 TABLET, FILM COATED ORAL 3 TIMES DAILY PRN
Qty: 90 TABLET | Refills: 0 | Status: SHIPPED | OUTPATIENT
Start: 2024-10-01

## 2024-10-03 ENCOUNTER — TREATMENT (OUTPATIENT)
Dept: PHYSICAL THERAPY | Facility: CLINIC | Age: 39
End: 2024-10-03
Payer: COMMERCIAL

## 2024-10-03 DIAGNOSIS — G89.29 CHRONIC LEFT-SIDED LOW BACK PAIN WITH LEFT-SIDED SCIATICA: Primary | ICD-10-CM

## 2024-10-03 DIAGNOSIS — M54.42 CHRONIC LEFT-SIDED LOW BACK PAIN WITH LEFT-SIDED SCIATICA: Primary | ICD-10-CM

## 2024-10-03 PROCEDURE — 97110 THERAPEUTIC EXERCISES: CPT | Performed by: PHYSICAL THERAPIST

## 2024-10-03 PROCEDURE — 97112 NEUROMUSCULAR REEDUCATION: CPT | Performed by: PHYSICAL THERAPIST

## 2024-10-03 NOTE — PROGRESS NOTES
Physical Therapy Daily Treatment Note                  [address] [facility] [address]  [place] [address]      Patient: Maren Ray   : 1985  Diagnosis/ICD-10 Code:  Chronic left-sided low back pain with left-sided sciatica [M54.42, G89.29]  Referring practitioner: No ref. 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 The numbness in the left foot isn't as bad and just more tingly. Standing a lot longer. Saw the neuro PA and wants to wait another 4 weeks to see if things ease off from the mishap during epidural procedure. Otherwise, surgery is the next option.     Objective   See Exercise, Manual, and Modality Logs for complete treatment.       Assessment/Plan  Able to add a couple of exercises focusing on proximal stabilization with no symptoms. She reported enjoying the new exercises because she can tell when she does them right/wrong with understanding her body more. Will f/u next visit on how she did later on with progressions.   Progress per Plan of Care and Progress strengthening /stabilization /functional activity           Timed:  Manual Therapy:         mins  24769;  Therapeutic Exercise:    10     mins  29395;     Neuromuscular Derrick:    23    mins  43814;    Therapeutic Activity:          mins  80101;     Gait Training:           mins  79156;     Ultrasound:          mins  95952;    Electrical Stimulation:         mins  86077;  Iontophoresis          mins  73008;  Work Conditioning 1-2 hr ___ mins 22536;  Work Conditioning ea additional hr ___ mins 04467    Untimed:  Electrical Stimulation:         mins  17203 ( );  Mechanical Traction:         mins  96100;   Fluidotherapy          mins  22994    Timed Treatment:   33   mins   Total  Treatment:     33   mins        Asya Villafuerte PTA  Physical Therapist Assistant

## 2024-10-05 DIAGNOSIS — M51.369 DDD (DEGENERATIVE DISC DISEASE), LUMBAR: ICD-10-CM

## 2024-10-05 DIAGNOSIS — M54.16 LUMBAR RADICULOPATHY: ICD-10-CM

## 2024-10-07 RX ORDER — GABAPENTIN 300 MG/1
CAPSULE ORAL
Qty: 81 CAPSULE | Refills: 0 | Status: SHIPPED | OUTPATIENT
Start: 2024-10-07

## 2024-10-07 NOTE — TELEPHONE ENCOUNTER
Provider:  Amador  Surgery/Procedure:  KARIE  Surgery/Procedure Date:    Last visit:   10/1/24  Next visit: 11/20/24     Reason for call:  Refill for gabapentin pending.     [place]:    1 09/04/2024 [id number] Gabapentin 300MG Gentry Doherty [facility]  81.00 30 04 [facility] 09/04/2024 [facility]  1 08/23/2024 [id number] Tramadol Hydrochloride 50MG Elyssa Orellana [facility]   [facility]  120.00 30 01 [place]  [facility] 08/23/2024 [facility]  1 08/19/2024 [id number] Tramadol Hydrochloride 50MG Mimi Bliss [facility]  20.00 5 04 [facility] 08/19/2024 [facility]  1 08/12/2024 [id number] Tramadol Hydrochloride 50MG Mimi Bliss [facility]   [facility]  20.00 5 04 [place]

## 2024-10-10 ENCOUNTER — TREATMENT (OUTPATIENT)
Dept: PHYSICAL THERAPY | Facility: CLINIC | Age: 39
End: 2024-10-10
Payer: COMMERCIAL

## 2024-10-10 DIAGNOSIS — G89.29 CHRONIC LEFT-SIDED LOW BACK PAIN WITH LEFT-SIDED SCIATICA: Primary | ICD-10-CM

## 2024-10-10 DIAGNOSIS — M54.42 CHRONIC LEFT-SIDED LOW BACK PAIN WITH LEFT-SIDED SCIATICA: Primary | ICD-10-CM

## 2024-10-10 PROCEDURE — 97530 THERAPEUTIC ACTIVITIES: CPT | Performed by: PHYSICAL THERAPIST

## 2024-10-10 PROCEDURE — 97110 THERAPEUTIC EXERCISES: CPT | Performed by: PHYSICAL THERAPIST

## 2024-10-10 PROCEDURE — 97112 NEUROMUSCULAR REEDUCATION: CPT | Performed by: PHYSICAL THERAPIST

## 2024-10-10 NOTE — PROGRESS NOTES
Physical Therapy Reassessment  [address] [facility] [address]    [place] [address]  Patient: Maren Ray   : 1985  Diagnosis/ICD-10 Code:  Chronic left-sided low back pain with left-sided sciatica [M54.42, G89.29]  Referring practitioner: Sariah Harrington PA-C  Date of Initial Visit: 10/10/2024  Today's Date: 10/10/2024  Patient seen for 8 sessions         Visit Diagnoses:    ICD-10-CM ICD-9-CM   1. Chronic left-sided low back pain with left-sided sciatica  M54.42 724.2    G89.29 724.3     338.29       Subjective Questionnaire: Oswestry: 17/50=34% impairment   Clinical Progress: improved  Home Program Compliance: Yes  Treatment has included: therapeutic exercise, neuromuscular re-education, manual therapy, and therapeutic activity      Subjective   Maren Ray reports: Symptoms from flare up after epidural seems to have eased quite a bit. Able to tolerate sitting, standing, walking for short periods. N/T has improved quite a bit over the past week. Last night was able to shower, dress w/o having to take breaks-first time since /July. N/T will onset when standing, especially after sitting for long periods. Present lateral 2 toes/foot back to heel. Pn this week at worst 4/10. Denies sleep disruption.    Pre tx pn score: 2  Post tx pn score: 3    Treatment  See Exercise, Manual, and Modality Logs for complete treatment.     Access Code: [email or web address]  URL: [email or web address]  Date: 10/10/2024  Prepared by: Asya Dong    Exercises  - Supine Lower Trunk Rotation  - 1 x daily - 5 reps - 5 sec hold  - Side Stepping with Resistance at Thighs  - 1 x daily - 15-30 reps  - Standing Anti-Rotation Press with Anchored Resistance  - 1 x daily - 15-30 reps  - Shoulder extension with resistance - Neutral  - 1 x daily - 15-30 reps  - Sit to Stand with Resistance Around Legs  - 1 x daily - 15-30 reps  - Seated Slump Nerve Glide  - 1 x daily - 15-30 reps    Objective          Tenderness     Additional  Tenderness Details  Min L lumbar PS, gluteal mm, prox/mid hamstrings    Neurological Testing     Sensation     Lumbar   Left   Intact: light touch    Right   Intact: light touch    Reflexes   Left   Patellar (L4): normal (2+)  Achilles (S1): absent (0)    Right   Patellar (L4): normal (2+)  Achilles (S1): normal (2+)    Active Range of Motion     Additional Active Range of Motion Details  Trunk Flxn WNL, no inc pn, slight stretch posterior L leg   Trunk Extn mild pn mid posterior thigh  Trunk Rotation WNL, no pn  Trunk SB WNL no pn    Prone lying/prone on elbows diminished leg symptoms  KTC inc post thigh and calf pn    Strength/Myotome Testing     Left Hip   Planes of Motion   Flexion: 4 (pn post thigh/glut)    Right Hip   Planes of Motion   Flexion: 5    Left Knee   Flexion: 5  Extension: 5    Right Knee   Flexion: 5  Extension: 4+    Left Ankle/Foot   Dorsiflexion: 5  Plantar flexion: 5  Great toe extension: 5    Right Ankle/Foot   Dorsiflexion: 5  Plantar flexion: 5  Great toe extension: 5    Tests       Thoracic   Positive slump (L post thigh pn/tension).     Lumbar     Left   Positive crossed SLR and passive SLR (30 deg hip flxn).             Assessment & Plan       Assessment  Assessment details: Reassessment performed. Pt has completed 8 PT visits. Pt reports decreased pn intensity, improved tolerance to sitting, standing, walking; dec frequency of LE paresthesia; improved sleep quality. RLE strength improved, trunk mobility less painful. Mod Oswestry score improved 24%. Cont to report limited tolerance to prolonged positions, heavy household activity, and ongoing paresthesia in the L foot. (+) SLR, crossed SLR. Able to increase exercise volume/resistance today, but does fatigue easily. Progressing well toward goals and would benefit from ongoing PT to improve strength and reduce pn.    Goals  Plan Goals: STG 3wks  1) Pt to be compliant w/ initial HEP for ROM, stretching, and symptom mgmt.MET  2) Pt to  report pn at rest to be no greater than 5/10.-MET  3) Pt to improve LLE strength to 4/5 or better in all planes.-MET  4) Pt to improve Mod Oswestry score to 20/50 or better to reflect improved pn and function.-MET    LTG 6wks  1) Pt to be independent w/ long term HEP and self mgmt.-ONGOING  2) Pt to report back/leg pn w/ ADLs to be no greater than 2/10.-PROGRESSING  3) Pt to demo full and pn free trunk mobility in all planes.-PARTIALLY MET  4) Pt to improve LLE strength to 4+/5 or better in all planes.-PARTIALLY MET  5) Pt to improve Mod Oswestry score to 12/50 or better to reflect improved pn and function.-PROGRESSING      Plan  Plan details: Cont w/ focus on core/hip strengthening, neurodynamics        Progress toward previous goals: Partially Met        Timed:         Manual Therapy:    0     mins  64215;     Therapeutic Exercise:    15     mins  11595;     Neuromuscular Derrick:    10    mins  51707;    Therapeutic Activity:     25     mins  83734;     Gait Trainin     mins  25867;     Ultrasound:     0     mins  95609;    Ionto                               0    mins   57201  Self Care                       0     mins   40897  Canalith Repos    0     mins 57089  Work Conditioning 1-2 hours    0    mins 15115  Work Conditioning ea add'l hour    0   mins 08691    Un-Timed:  Electrical Stimulation:    0     mins  52293 ( );  Dry Needling     0     mins self-pay  Traction     0     mins 13604  Re-Eval                           0    mins  76904      Timed Treatment:   50   mins   Total Treatment:     50   mins          PT: Asya Dong PT     [place] License: #[id number]    Electronically signed by Asya Dong PT, 10/10/24, 8:32 AM EDT

## 2024-10-12 DIAGNOSIS — M54.16 LUMBAR BACK PAIN WITH RADICULOPATHY AFFECTING LEFT LOWER EXTREMITY: ICD-10-CM

## 2024-10-14 RX ORDER — TRAMADOL HYDROCHLORIDE 50 MG/1
50 TABLET ORAL EVERY 6 HOURS PRN
Qty: 120 TABLET | Refills: 0 | OUTPATIENT
Start: 2024-10-14 | End: 2024-11-13

## 2024-10-15 ENCOUNTER — TREATMENT (OUTPATIENT)
Dept: PHYSICAL THERAPY | Facility: CLINIC | Age: 39
End: 2024-10-15
Payer: COMMERCIAL

## 2024-10-15 DIAGNOSIS — G89.29 CHRONIC LEFT-SIDED LOW BACK PAIN WITH LEFT-SIDED SCIATICA: Primary | ICD-10-CM

## 2024-10-15 DIAGNOSIS — M54.42 CHRONIC LEFT-SIDED LOW BACK PAIN WITH LEFT-SIDED SCIATICA: Primary | ICD-10-CM

## 2024-10-15 PROCEDURE — 97112 NEUROMUSCULAR REEDUCATION: CPT | Performed by: PHYSICAL THERAPIST

## 2024-10-15 PROCEDURE — 97530 THERAPEUTIC ACTIVITIES: CPT | Performed by: PHYSICAL THERAPIST

## 2024-10-15 PROCEDURE — 97116 GAIT TRAINING THERAPY: CPT | Performed by: PHYSICAL THERAPIST

## 2024-10-15 PROCEDURE — 97110 THERAPEUTIC EXERCISES: CPT | Performed by: PHYSICAL THERAPIST

## 2024-10-15 NOTE — PROGRESS NOTES
Physical Therapy Daily Treatment Note                  [address] [facility] [address]  [place] [address]      Patient: Maren Ray   : 1985  Diagnosis/ICD-10 Code:  Chronic left-sided low back pain with left-sided sciatica [M54.42, G89.29]  Referring practitioner: Sariah Harrington PA-C  Date of Initial Visit: Type: THERAPY  Noted: 2024  Today's Date: 10/15/2024  Patient seen for 9 sessions             Subjective   Maren Ray reports: been having more pain across the back and into both hips the past few days. Still getting N/T when standing/walking. The increase in resistance band has cause some increased pain as well when completing exercises.     Objective          Ambulation     Quality of Movement During Gait   Trunk  Forward lean.   Trunk (Left): Positive left lateral lean over stance limb.   Trunk (Right): Positive lateral lean over stance limb.     Pelvis    Pelvis (Left): Positive Trendelenburg.       See Exercise, Manual, and Modality Logs for complete treatment.       Assessment/Plan  Pt was able to complete updated exercises with resistance with minimal symptoms after going over core stabilization techniques involved ADIM. Also took time to go over ambulation factors that have formed over time. She tends to get off the left side more quickly and allow a lot of trunk/pelvic movement that is not necessary when walking. She was able to decrease her symptoms when walking with verbal cues to improve proximal stability.   Progress per Plan of Care and Progress strengthening /stabilization /functional activity           Timed:  Manual Therapy:         mins  40617;  Therapeutic Exercise:    10     mins  96481;     Neuromuscular Derrick:   15    mins  09620;    Therapeutic Activity:      8      mins  13727;     Gait Training:      10     mins  80535;     Ultrasound:          mins  86423;    Electrical Stimulation:         mins  00690;  Iontophoresis          mins   00762;  Work Conditioning 1-2 hr ___ mins 80523;  Work Conditioning ea additional hr ___ mins 70937    Untimed:  Electrical Stimulation:         mins  09611 ( );  Mechanical Traction:         mins  43016;   Fluidotherapy          mins  42762    Timed Treatment:   43   mins   Total Treatment:     43   mins        Asya Villafuerte PTA  Physical Therapist Assistant

## 2024-10-17 ENCOUNTER — TREATMENT (OUTPATIENT)
Dept: PHYSICAL THERAPY | Facility: CLINIC | Age: 39
End: 2024-10-17
Payer: COMMERCIAL

## 2024-10-17 DIAGNOSIS — M54.42 CHRONIC LEFT-SIDED LOW BACK PAIN WITH LEFT-SIDED SCIATICA: Primary | ICD-10-CM

## 2024-10-17 DIAGNOSIS — G89.29 CHRONIC LEFT-SIDED LOW BACK PAIN WITH LEFT-SIDED SCIATICA: Primary | ICD-10-CM

## 2024-10-17 PROCEDURE — 97112 NEUROMUSCULAR REEDUCATION: CPT | Performed by: PHYSICAL THERAPIST

## 2024-10-17 PROCEDURE — 97110 THERAPEUTIC EXERCISES: CPT | Performed by: PHYSICAL THERAPIST

## 2024-10-17 PROCEDURE — 97530 THERAPEUTIC ACTIVITIES: CPT | Performed by: PHYSICAL THERAPIST

## 2024-10-17 NOTE — PROGRESS NOTES
Physical Therapy Daily Treatment Note                  [address] [facility] [address]  [place] [address]      Patient: Maren Ray   : 1985  Diagnosis/ICD-10 Code:  Chronic left-sided low back pain with left-sided sciatica [M54.42, G89.29]  Referring practitioner: Sariah Harrington PA-C  Date of Initial Visit: Type: THERAPY  Noted: 2024  Today's Date: 10/17/2024  Patient seen for 10 sessions             Subjective   Maren Ray reports: not as much numbness in the left foot. The left knee has been bothering her this morning front to back in the joint. Stiffness in the low back.     Objective   OBSERVATION: ambulation has improved with a decrease in trunk leaning and hip drop.   See Exercise, Manual, and Modality Logs for complete treatment.       Assessment/Plan  Able to complete exercises with an increase in sets with some activities. Had some tingling in the left leg by the end of treatment but eased off with rest. No increase in pain. Progressing gradually but improving.   Progress per Plan of Care and Progress strengthening /stabilization /functional activity           Timed:  Manual Therapy:         mins  44763;  Therapeutic Exercise:    24     mins  94824;     Neuromuscular Derrick:   10    mins  73652;    Therapeutic Activity:     8     mins  73887;     Gait Training:           mins  33444;     Ultrasound:          mins  68001;    Electrical Stimulation:         mins  55616;  Iontophoresis          mins  18748;  Work Conditioning 1-2 hr ___ mins 81199;  Work Conditioning ea additional hr ___ mins 60406    Untimed:  Electrical Stimulation:         mins  69653 ( );  Mechanical Traction:         mins  23420;   Fluidotherapy          mins  86992    Timed Treatment:   42   mins   Total Treatment:     42   mins        Asya Villafuerte PTA  Physical Therapist Assistant

## 2024-10-18 ENCOUNTER — TELEPHONE (OUTPATIENT)
Dept: FAMILY MEDICINE CLINIC | Facility: CLINIC | Age: 39
End: 2024-10-18
Payer: COMMERCIAL

## 2024-10-18 NOTE — TELEPHONE ENCOUNTER
Patient notified of providers response message and verbalized understanding. She will contact her pain management doctor.

## 2024-10-18 NOTE — TELEPHONE ENCOUNTER
Caller: Maren Ray    Relationship: Self    Best call back number: [phone]      What medication are you requesting: TRAMADOL HCL 50 MG      Have you had these symptoms before:    [x] Yes  [] No    Have you been treated for these symptoms before:   [x] Yes  [] No    If a prescription is needed, what is your preferred pharmacy and phone number: [id number] - [place], [place] - [address] [facility] [address] - [phone]  - [phone]      Additional notes: PATIENT IS OUT. PATIENT STATES SHE HAS BEEN TAKING THIS MEDICATION SINCE AUGUST AS PRESCRIBED BY TRAVIS HAMILTON. HER PAIN MANAGEMENT AND NEUROLOGIST WANTS HER TO CONTINUE TAKING THIS MEDICATION AS PRESCRIBED HER PRIMARY CARE PROVIDER.

## 2024-10-24 ENCOUNTER — TREATMENT (OUTPATIENT)
Dept: PHYSICAL THERAPY | Facility: CLINIC | Age: 39
End: 2024-10-24
Payer: COMMERCIAL

## 2024-10-24 DIAGNOSIS — G89.29 CHRONIC LEFT-SIDED LOW BACK PAIN WITH LEFT-SIDED SCIATICA: Primary | ICD-10-CM

## 2024-10-24 DIAGNOSIS — M54.42 CHRONIC LEFT-SIDED LOW BACK PAIN WITH LEFT-SIDED SCIATICA: Primary | ICD-10-CM

## 2024-10-24 PROCEDURE — 97112 NEUROMUSCULAR REEDUCATION: CPT | Performed by: PHYSICAL THERAPIST

## 2024-10-24 PROCEDURE — 97110 THERAPEUTIC EXERCISES: CPT | Performed by: PHYSICAL THERAPIST

## 2024-10-24 PROCEDURE — 97530 THERAPEUTIC ACTIVITIES: CPT | Performed by: PHYSICAL THERAPIST

## 2024-10-24 NOTE — PROGRESS NOTES
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 F/u with pain management on Tuesday. Can tell the left foot is still a little numb but much better.     Low back  5/10 pain pre treatment  3/10 pain post treatment    Objective   See Exercise, Manual, and Modality Logs for complete treatment.       Assessment/Plan  Pt was able to increase volume of repetitions with no increase in symptoms. Overall, exercise decreased pain and leg symptoms.   Progress per Plan of Care and Progress strengthening /stabilization /functional activity           Timed:  Manual Therapy:         mins  51071;  Therapeutic Exercise:    24     mins  07118;     Neuromuscular Derrick:   10    mins  21626;    Therapeutic Activity:     10     mins  42048;     Gait Training:           mins  38130;     Ultrasound:          mins  57485;    Electrical Stimulation:         mins  21011;  Iontophoresis          mins  22886;  Work Conditioning 1-2 hr ___ mins 20681;  Work Conditioning ea additional hr ___ mins 67551    Untimed:  Electrical Stimulation:         mins  36738 ( );  Mechanical Traction:         mins  09155;   Fluidotherapy          mins  53299    Timed Treatment:   44   mins   Total Treatment:     44   mins        Asya Villafuerte PTA  Physical Therapist Assistant

## 2024-10-28 ENCOUNTER — TELEPHONE (OUTPATIENT)
Dept: PAIN MEDICINE | Facility: CLINIC | Age: 39
End: 2024-10-28
Payer: COMMERCIAL

## 2024-10-28 NOTE — TELEPHONE ENCOUNTER
Left VM for patient to call the office to r/s appointment with Simone Irizarry on 10/28/2024.    Relay     HUB Please reschedule the patient's 10/28/2024 appointment with Pain Management.

## 2024-10-29 ENCOUNTER — TREATMENT (OUTPATIENT)
Dept: PHYSICAL THERAPY | Facility: CLINIC | Age: 39
End: 2024-10-29
Payer: COMMERCIAL

## 2024-10-29 DIAGNOSIS — M54.42 CHRONIC LEFT-SIDED LOW BACK PAIN WITH LEFT-SIDED SCIATICA: Primary | ICD-10-CM

## 2024-10-29 DIAGNOSIS — G89.29 CHRONIC LEFT-SIDED LOW BACK PAIN WITH LEFT-SIDED SCIATICA: Primary | ICD-10-CM

## 2024-10-29 PROCEDURE — 97110 THERAPEUTIC EXERCISES: CPT | Performed by: PHYSICAL THERAPIST

## 2024-10-29 PROCEDURE — 97530 THERAPEUTIC ACTIVITIES: CPT | Performed by: PHYSICAL THERAPIST

## 2024-10-29 PROCEDURE — 97112 NEUROMUSCULAR REEDUCATION: CPT | Performed by: PHYSICAL THERAPIST

## 2024-10-29 NOTE — PROGRESS NOTES
Physical Therapy Daily Treatment Note                  [address] [facility] [address]  [place] [address]      Patient: Maren Ray   : 1985  Diagnosis/ICD-10 Code:  Chronic left-sided low back pain with left-sided sciatica [M54.42, G89.29]  Referring practitioner: Sariah Harrington PA-C  Date of Initial Visit: Type: THERAPY  Noted: 2024  Today's Date: 10/29/2024  Patient seen for 12 sessions             Subjective   Maren Ray reports: doctor had to reschedule pain management appt and now going this Thursday. She can only walk about 5 minutes before some kind of symptom arises in the hip or legs.     Objective   See Exercise, Manual, and Modality Logs for complete treatment.       Assessment/Plan  Started 5 min TM walk with ADIM on/off phases to reach fatigue but not too quickly. This also allows the patient to stay aware of activation. Able to reach 5 minutes before left hip started to get uncomfortable. Added ball raises with hip add isometric. Doing well with slow progressions. She will try to do a timed walk practicing ADIM daily to keep track of endurance with symptoms.   Progress per Plan of Care and Progress strengthening /stabilization /functional activity           Timed:  Manual Therapy:         mins  75486;  Therapeutic Exercise:    30     mins  18471;     Neuromuscular Derrick:   15    mins  92866;    Therapeutic Activity:     10     mins  28740;     Gait Training:           mins  91754;     Ultrasound:          mins  17019;    Electrical Stimulation:         mins  40159;  Iontophoresis          mins  73431;  Work Conditioning 1-2 hr ___ mins 69936;  Work Conditioning ea additional hr ___ mins 17030    Untimed:  Electrical Stimulation:         mins  03007 ( );  Mechanical Traction:         mins  15770;   Fluidotherapy          mins  49514    Timed Treatment:   50   mins   Total Treatment:     50   mins        Asya Villafuerte PTA  Physical Therapist  Assistant

## 2024-10-31 ENCOUNTER — LAB (OUTPATIENT)
Dept: LAB | Facility: HOSPITAL | Age: 39
End: 2024-10-31
Payer: COMMERCIAL

## 2024-10-31 ENCOUNTER — OFFICE VISIT (OUTPATIENT)
Dept: PAIN MEDICINE | Facility: CLINIC | Age: 39
End: 2024-10-31
Payer: COMMERCIAL

## 2024-10-31 VITALS
BODY MASS INDEX: 47.09 KG/M2 | HEIGHT: 66 IN | OXYGEN SATURATION: 98 % | WEIGHT: 293 LBS | HEART RATE: 85 BPM | TEMPERATURE: 96.9 F

## 2024-10-31 DIAGNOSIS — M54.16 LUMBAR RADICULOPATHY: Primary | ICD-10-CM

## 2024-10-31 DIAGNOSIS — Z51.81 ENCOUNTER FOR THERAPEUTIC DRUG LEVEL MONITORING: Primary | ICD-10-CM

## 2024-10-31 DIAGNOSIS — Z51.81 ENCOUNTER FOR THERAPEUTIC DRUG LEVEL MONITORING: ICD-10-CM

## 2024-10-31 LAB
AMPHET+METHAMPHET UR QL: NEGATIVE
AMPHETAMINES UR QL: NEGATIVE
BARBITURATES UR QL SCN: NEGATIVE
BENZODIAZ UR QL SCN: NEGATIVE
BUPRENORPHINE SERPL-MCNC: NEGATIVE NG/ML
CANNABINOIDS SERPL QL: NEGATIVE
COCAINE UR QL: NEGATIVE
FENTANYL UR-MCNC: NEGATIVE NG/ML
METHADONE UR QL SCN: NEGATIVE
OPIATES UR QL: NEGATIVE
OXYCODONE UR QL SCN: NEGATIVE
PCP UR QL SCN: NEGATIVE
TRICYCLICS UR QL SCN: NEGATIVE

## 2024-10-31 PROCEDURE — 80307 DRUG TEST PRSMV CHEM ANLYZR: CPT

## 2024-10-31 RX ORDER — GABAPENTIN 600 MG/1
600 TABLET ORAL 3 TIMES DAILY
Qty: 90 TABLET | Refills: 1 | Status: SHIPPED | OUTPATIENT
Start: 2024-10-31

## 2024-10-31 NOTE — TELEPHONE ENCOUNTER
Uptitrate gabapentin  Gabapentin 600 mg 3 times daily, 90 pills, 1 refill  Pending UDS  Controlled subs agreement signed in office  [place] reviewed and compliant  Appropriate follow-up visit scheduled

## 2024-10-31 NOTE — PROGRESS NOTES
Referring Physician: No referring provider defined for this encounter.    Primary Physician: Mimi Bliss MD    CHIEF COMPLAINT or REASON FOR VISIT: Leg Pain (Leg, hip, and heel pain left side. F/u)      Initial history of present illness on 09/16/2024:  Ms. Maren Ray is 39 y.o. female who presents as a new patient referral for evaluation and treatment of a 2-month history of low back pain with radiation to left lower extremity.  Ms. Ray was in her normal state of health without any history of spinal surgery when, in July, without any inciting event or trauma she developed new onset left-sided low back pain with radiation to left lower extremity and plantar surface of foot.  She does have a past history of fibromyalgia which manifests as diffuse muscle pain primarily in the shoulders and neck.  She has been engaged in physical therapy for this issue and has additionally seen Dr. Doherty, neurosurgery, who started her on gabapentin 3 times a day.  Patient notes benefit with gabapentin and physical therapy.  She continues to complain of primarily a numbness type pain radiating down the left side of her leg to the bottom of her foot.  Patient denies any bowel or bladder dysfunction, lower extremity weakness, new onset saddle anesthesia or unexplained weight loss.       Interval history:  Patient returns to clinic today after undergoing a lumbar epidural steroid injection.  Patient does report improvement in her left lower extremity pain following this injection.  Unfortunately, the procedure was quite painful for her.  Patient reports she has a high pain tolerance and has undergone epidural injections in the past during her pregnancy but this pain was pretty severe.  Nonetheless, this did resolve without incident.  She has noticed an improvement in her left lower extremity pain.  She continues to complain of low back pain with radiation left lower extremity.  There is associated numbness and tingling  within her left leg and left heel.  She is following with neurosurgery.  She has been taking a muscle relaxer with good relief.  She has been taking gabapentin 300 mg 3 times daily with some relief and no side effects.  She has a follow-up appointment with neurosurgery on 11/20/2024.  She reports she would like to avoid surgery at all costs.  She has been completing physical therapy.  She reports her physical therapist believes she has some muscle tightness and swelling within her left leg.  Overall, she is doing better but still has pain.    Interventions:  9/26/2024: L5/S1 LESI 80% relief of left lower extremity pain.    Objective Pain Scoring:   BRIEF PAIN INVENTORY:  Total score:   Pain Score    10/31/24 0839   PainSc:   5   PainLoc: Leg  Comment: hip, heel left side        PHQ-2:    PHQ-9:    Opioid Risk Tool:         Review of Systems:   ROS negative except as otherwise noted     Past Medical History:   Past Medical History:   Diagnosis Date    Acid reflux     Allergic     Anxiety     Arthritis     Breast cancer screening by mammogram 08/12/2024    Bronchitis     Chronic pain disorder     CTS (carpal tunnel syndrome)     Depression     Extremity pain     Left leg & hip    Fibromyalgia     Fibromyalgia, primary     Headache, tension-type     History of ear infections     Hypotension     Hypotension     Joint pain     Migraines     Neck pain     Obesity     Peripheral neuropathy     Shingles     Sinusitis     Sleep apnea     Spinal stenosis 08/2024    TMJ dysfunction     Urinary tract infection     Vaginal delivery 04/21/2017         Past Surgical History:   Past Surgical History:   Procedure Laterality Date    MOUTH SURGERY      WISDOM TOOTH EXTRACTION           Family History   Family History   Problem Relation Age of Onset    Chronic bronchitis Mother     Stroke Mother     Hypotension Mother     Restless legs syndrome Mother     Arthritis Mother     Sleep apnea Father     Obesity Father     Cancer Father      Diabetes Father     Hypertension Father     Arthritis Father     Obesity Brother     Diabetes Brother     Bipolar disorder Brother     Anxiety disorder Brother     Asthma Brother     Depression Brother     GI problems Brother         Gastroparesis    Cancer Paternal Grandmother     Heart disease Paternal Grandmother     Ovarian cancer Paternal Grandmother 70    Hypertension Paternal Grandmother     Kidney disease Paternal Grandmother     Diabetes Paternal Grandfather     Cancer Paternal Grandfather     Early death Maternal Grandmother         Tuberculosis in late 30s         Social History   Social History     Socioeconomic History    Marital status:      Spouse name: Rudy   Tobacco Use    Smoking status: Never     Passive exposure: Never    Smokeless tobacco: Never   Vaping Use    Vaping status: Never Used   Substance and Sexual Activity    Alcohol use: Never    Drug use: Never    Sexual activity: Yes     Partners: Male     Birth control/protection: None        Medications:     Current Outpatient Medications:     gabapentin (NEURONTIN) 300 MG capsule, TAKE 1 CAPSULE BY MOUTH EVERY NIGHT FOR 3 DAYS, THEN 1 CAPSULE BY MOUTH 2 TIMES A DAY FOR 3 DAYS, THEN 1 CAPSULE BY MOUTH 3 TIMES A DAY FOR 24 DAYS., Disp: 81 capsule, Rfl: 0    levocetirizine (Xyzal Allergy 24HR) 5 MG tablet, Take 1 tablet by mouth Every Evening., Disp: , Rfl:     lidocaine (XYLOCAINE) 4 % external solution, Apply  topically to the appropriate area as directed As Needed for Mild Pain. OTC PATCH, Disp: , Rfl:     methocarbamol (ROBAXIN) 750 MG tablet, Take 1 tablet by mouth 3 (Three) Times a Day As Needed for Muscle Spasms for up to 90 doses. This medicine will help with back pain from spasm take as needed use caution when starting medicine to ensure it does not cause drowsiness or other side effects, Disp: 90 tablet, Rfl: 0    sertraline (ZOLOFT) 50 MG tablet, Take 1 tablet by mouth Daily., Disp: 30 tablet, Rfl: 1        Physical Exam:  "    Vitals:    10/31/24 0839   Pulse: 85   Temp: 96.9 °F (36.1 °C)   TempSrc: Infrared   SpO2: 98%   Weight: (!) 164 kg (362 lb)   Height: 167.6 cm (65.98\")   PainSc:   5   PainLoc: Leg  Comment: hip, heel left side          General: Alert and oriented, No acute distress.   HEENT: Normocephalic, atraumatic.   Cardiovascular: No gross edema.  Obese  Respiratory: Respirations are non-labored      Lumbar Spine:   No masses or atrophy  Range of motion - Flexion reduced. Extension reduced.    Facet Loading: Negative bilaterally  Facet Palpation - Nontender   Carlos finger/Gaenslen's/Mike's/JANINA/Thigh thrust -   Straight leg raise/slump test: Positive left  Multifidus toe-touch test:    Motor Exam:      Strength: Rate on 1-5 scale Right Left    L1/2- hip flexion 5/5  5/5    L3- knee extension 5/5  5/5    L4- ankle dorsiflexion 5/5  5/5    L5- great toe extension 5/5  5/5    S1- ankle plantarflexion 5/5  5/5    Sensory Exam: Altered sensation left S1 dermatome      Neurologic: Cranial Nerves II-XII are grossly intact.     Psychiatric: Cooperative.   Gait: Normal   Assistive Devices: None    Imaging Studies:   No results found for this or any previous visit.        Independent review of radiographic imaging: Available for my interpretation is a lumbar MRI dated August 20, 2024 demonstrating straightening of the normal lordosis of the lumbar spine; central posterior disc protrusion at L5/S1 with bilateral lateral recess stenosis; multifidus atrophy.    Impression & Plan:       09/16/2024: aMren Ray is a 39 y.o. female with past medical history significant for depression, anxiety, sleep apnea, GERD, fibromyalgia, morbid obesity who presents to the pain clinic for evaluation and treatment of left-sided low back pain radiation to left lower extremity and foot.  MRI interpreted as above.  Evaluation consistent with lumbar radiculopathy.  We discussed epidural steroid injection to improve pain.  If greater than " 50% relief for at least 2-3 months can consider repeat as needed every 3 to 4 months.  I had a discussion with the patient regarding the risks of the procedure including bleeding, infection, damage to surrounding structures.  We discussed the potential adverse effects of corticosteroid injection including flushing of the face, lipodystrophy, skin discoloration, elevated blood glucose, increased blood pressure.  Risks of frequent steroid administration include weight gain, hormonal changes, mood changes, osteoporosis.  10/31/2024: Good relief from LESI.  Could contemplate repeat but would be cautious given pain during last procedure.  Will assume responsibility of gabapentin from [facility].  Will uptitrate gabapentin    1. Lumbar radiculopathy          PLAN:  1. Medication Recommendations: Recommend Voltaren topical, NSAIDs, Tylenol.  Can trial turmeric 500 mg twice daily if NSAID contraindicated.  -Assume responsibility gabapentin from neurosurgery  -Uptitrate gabapentin  -Gabapentin 600 mg 3 times daily, 90 pills, #1 refill  -As part of this patient's treatment plan, patient will be prescribed controlled substances. The patient has been made aware of appropriate use of such medications, including potential risk of somnolence, limited ability to drive and /or work safely, and potential for dependence or overdose. It has also been made clear that these medications are for use by this patient only, without concomitant use of alcohol or other substances unless prescribed.Controlled substance status of medication discussed with patient, discussed risks of medication including abuse potential and diversion potential and need to follow up for reevaluation appointment in order to receive further refills.  [place] was reviewed and compliant.    2. Physical Therapy: Continue HEP    3. Psychological: defer    4. Complementary and alternative (CAM) Therapies:     5. Labs/Diagnostic studies: Obtain compliance UDS    6. Imagin.  Interventions: Could consider repeat left paramedian lumbar interlaminar epidural steroid injection (85836).  Previous success at L5/S1.  Would be cautious of repeat injection given patient's response and pain during the procedure    8. Referrals: None indicated     9. Records: Reviewed neurosurgical notes    10. Lifestyle goals:    Follow-up 2 months for medication management      [facility]  Therese Redman PA-C          Quality Metrics:

## 2024-11-05 ENCOUNTER — TREATMENT (OUTPATIENT)
Dept: PHYSICAL THERAPY | Facility: CLINIC | Age: 39
End: 2024-11-05
Payer: COMMERCIAL

## 2024-11-05 DIAGNOSIS — M54.42 CHRONIC LEFT-SIDED LOW BACK PAIN WITH LEFT-SIDED SCIATICA: Primary | ICD-10-CM

## 2024-11-05 DIAGNOSIS — G89.29 CHRONIC LEFT-SIDED LOW BACK PAIN WITH LEFT-SIDED SCIATICA: Primary | ICD-10-CM

## 2024-11-05 PROCEDURE — 97110 THERAPEUTIC EXERCISES: CPT | Performed by: PHYSICAL THERAPIST

## 2024-11-05 PROCEDURE — 97112 NEUROMUSCULAR REEDUCATION: CPT | Performed by: PHYSICAL THERAPIST

## 2024-11-05 NOTE — PROGRESS NOTES
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 It would not have been possible to do this a month ago. She has been having increased tightness and pain in the left heel at times since this but they upped dosage of gabapentin. Thinks she may be having side effects with the double dose.     Objective   See Exercise, Manual, and Modality Logs for complete treatment.       Assessment/Plan  Skipped some activities due to increase leg tightness and irritation from long walks and standing three days ago. She has been doing better with being able to take prolonged walks and standing that were not possible a few weeks ago but would like to get to the point where symptoms are not as strong and can manage quickly post activity.   Progress per Plan of Care and Progress strengthening /stabilization /functional activity           Timed:  Manual Therapy:         mins  30429;  Therapeutic Exercise:    26     mins  32797;     Neuromuscular Derrick:   10    mins  45810;    Therapeutic Activity:          mins  84304;     Gait Training:           mins  30236;     Ultrasound:          mins  74446;    Electrical Stimulation:         mins  51493;  Iontophoresis          mins  35084;  Work Conditioning 1-2 hr ___ mins 65484;  Work Conditioning ea additional hr ___ mins 72537    Untimed:  Electrical Stimulation:         mins  56565 ( );  Mechanical Traction:          mins  75988;   Fluidotherapy          mins  44230    Timed Treatment:   36   mins   Total Treatment:     36   mins        Asya Villafuerte PTA  Physical Therapist Assistant

## 2024-11-06 RX ORDER — METHOCARBAMOL 750 MG/1
750 TABLET, FILM COATED ORAL 3 TIMES DAILY PRN
Qty: 90 TABLET | Refills: 0 | Status: SHIPPED | OUTPATIENT
Start: 2024-11-06

## 2024-11-08 ENCOUNTER — TREATMENT (OUTPATIENT)
Dept: PHYSICAL THERAPY | Facility: CLINIC | Age: 39
End: 2024-11-08
Payer: COMMERCIAL

## 2024-11-08 DIAGNOSIS — M54.42 CHRONIC LEFT-SIDED LOW BACK PAIN WITH LEFT-SIDED SCIATICA: Primary | ICD-10-CM

## 2024-11-08 DIAGNOSIS — G89.29 CHRONIC LEFT-SIDED LOW BACK PAIN WITH LEFT-SIDED SCIATICA: Primary | ICD-10-CM

## 2024-11-08 PROCEDURE — 97530 THERAPEUTIC ACTIVITIES: CPT | Performed by: PHYSICAL THERAPIST

## 2024-11-08 PROCEDURE — 97112 NEUROMUSCULAR REEDUCATION: CPT | Performed by: PHYSICAL THERAPIST

## 2024-11-08 PROCEDURE — 97110 THERAPEUTIC EXERCISES: CPT | Performed by: PHYSICAL THERAPIST

## 2024-11-08 NOTE — PROGRESS NOTES
Physical Therapy Daily Treatment Note                  [address] [facility] [address]  [place] [address]      Patient: Maren Ray   : 1985  Diagnosis/ICD-10 Code:  Chronic left-sided low back pain with left-sided sciatica [M54.42, G89.29]  Referring practitioner: Sariah Harrington PA-C  Date of Initial Visit: Type: THERAPY  Noted: 2024  Today's Date: 2024  Patient seen for 14 sessions             Subjective   Maren Ray reports: pt has been having more good days than bad days. Able to shower and just rest for a few minutes afterwards and go on with her day. Also able to stand and dry her hair with minimal pain. Starting to feel the arthritic/fibro pains more than the nerve pain now.     Objective   See Exercise, Manual, and Modality Logs for complete treatment.       Assessment/Plan  Able to progress strengthening with no increase in symptoms. Pt needed minimal to no breaks in between activities. Pt noted she is having more good days then bad days which is a positive report on the efficacy of treatment.   Progress per Plan of Care and Progress strengthening /stabilization /functional activity           Timed:  Manual Therapy:         mins  19842;  Therapeutic Exercise:    23     mins  07550;     Neuromuscular Derrick:   11    mins  28321;    Therapeutic Activity:     10     mins  34580;     Gait Training:           mins  23435;     Ultrasound:          mins  55388;    Electrical Stimulation:         mins  30673;  Iontophoresis          mins  54748;  Work Conditioning 1-2 hr ___ mins 06942;  Work Conditioning ea additional hr ___ mins 88577    Untimed:  Electrical Stimulation:         mins  87776 ( );  Mechanical Traction:         mins  45282;   Fluidotherapy          mins  77720    Timed Treatment:   44   mins   Total Treatment:     44   mins        Asya Villafuerte PTA  Physical Therapist Assistant

## 2024-11-10 DIAGNOSIS — F41.9 ANXIETY: ICD-10-CM

## 2024-11-10 DIAGNOSIS — F32.A DEPRESSION, UNSPECIFIED DEPRESSION TYPE: ICD-10-CM

## 2024-11-11 ENCOUNTER — HOSPITAL ENCOUNTER (OUTPATIENT)
Dept: MAMMOGRAPHY | Facility: HOSPITAL | Age: 39
Discharge: HOME | End: 2024-11-11
Admitting: FAMILY MEDICINE
Payer: COMMERCIAL

## 2024-11-11 DIAGNOSIS — Z12.31 BREAST CANCER SCREENING BY MAMMOGRAM: ICD-10-CM

## 2024-11-11 LAB
NCCN CRITERIA FLAG: NORMAL
TYRER CUZICK SCORE: 13.8

## 2024-11-11 PROCEDURE — 77063 BREAST TOMOSYNTHESIS BI: CPT

## 2024-11-11 PROCEDURE — 77067 SCR MAMMO BI INCL CAD: CPT

## 2024-11-12 ENCOUNTER — TREATMENT (OUTPATIENT)
Dept: PHYSICAL THERAPY | Facility: CLINIC | Age: 39
End: 2024-11-12
Payer: COMMERCIAL

## 2024-11-12 DIAGNOSIS — M54.42 CHRONIC LEFT-SIDED LOW BACK PAIN WITH LEFT-SIDED SCIATICA: Primary | ICD-10-CM

## 2024-11-12 DIAGNOSIS — G89.29 CHRONIC LEFT-SIDED LOW BACK PAIN WITH LEFT-SIDED SCIATICA: Primary | ICD-10-CM

## 2024-11-12 PROCEDURE — 77067 SCR MAMMO BI INCL CAD: CPT | Performed by: RADIOLOGY

## 2024-11-12 PROCEDURE — 97110 THERAPEUTIC EXERCISES: CPT | Performed by: PHYSICAL THERAPIST

## 2024-11-12 PROCEDURE — 77063 BREAST TOMOSYNTHESIS BI: CPT | Performed by: RADIOLOGY

## 2024-11-12 PROCEDURE — 97530 THERAPEUTIC ACTIVITIES: CPT | Performed by: PHYSICAL THERAPIST

## 2024-11-12 PROCEDURE — 97112 NEUROMUSCULAR REEDUCATION: CPT | Performed by: PHYSICAL THERAPIST

## 2024-11-12 NOTE — PROGRESS NOTES
Physical Therapy Daily Treatment Note                  [address] [facility] [address]  [place] [address]      Patient: Maren Ray   : 1985  Diagnosis/ICD-10 Code:  Chronic left-sided low back pain with left-sided sciatica [M54.42, G89.29]  Referring practitioner: Sariah Harrington PA-C  Date of Initial Visit: Type: THERAPY  Noted: 2024  Today's Date: 2024  Patient seen for 15 sessions             Subjective   Maren Ray reports: started with chiropractor on Friday and seeing even more results with her pain. Not having as much nerve tension either in the left leg. He had mentioned that her hips were not aligned making the left leg longer. After first appointment, able to do more deep cleaning in the house, play more with the kids, and walk further. Will be going 2x a week with chiro and 2x a week therapy.     Objective   See Exercise, Manual, and Modality Logs for complete treatment.       Assessment/Plan  Pt was able to progress to green TB during resisted activities. Pt noted with muscle fatigue and burn compared to before rest breaks were needed for nerve irritation. Noted a difference in her stamina and symptoms through out session. Able to complete SL stance with no radicular symptoms. Reassessment needed next visit for progress towards goals.  Progress per Plan of Care and Progress strengthening /stabilization /functional activity           Timed:  Manual Therapy:         mins  54133;  Therapeutic Exercise:    23     mins  15044;     Neuromuscular Derrick:   10    mins  05471;    Therapeutic Activity:     9     mins  62253;     Gait Training:           mins  55471;     Ultrasound:          mins  34322;    Electrical Stimulation:         mins  71093;  Iontophoresis          mins  58279;  Work Conditioning 1-2 hr ___ mins 17368;  Work Conditioning ea additional hr ___ mins 45479    Untimed:  Electrical Stimulation:         mins  45642 (YULISA  );  Mechanical Traction:         mins  42283;   Fluidotherapy          mins  01162    Timed Treatment:   42   mins   Total Treatment:     42   mins        Asya Villafuerte PTA  Physical Therapist Assistant

## 2024-11-14 ENCOUNTER — TREATMENT (OUTPATIENT)
Dept: PHYSICAL THERAPY | Facility: CLINIC | Age: 39
End: 2024-11-14
Payer: COMMERCIAL

## 2024-11-14 DIAGNOSIS — M54.42 CHRONIC LEFT-SIDED LOW BACK PAIN WITH LEFT-SIDED SCIATICA: Primary | ICD-10-CM

## 2024-11-14 DIAGNOSIS — G89.29 CHRONIC LEFT-SIDED LOW BACK PAIN WITH LEFT-SIDED SCIATICA: Primary | ICD-10-CM

## 2024-11-14 PROCEDURE — 97530 THERAPEUTIC ACTIVITIES: CPT | Performed by: PHYSICAL THERAPIST

## 2024-11-14 PROCEDURE — 97112 NEUROMUSCULAR REEDUCATION: CPT | Performed by: PHYSICAL THERAPIST

## 2024-11-14 PROCEDURE — 97110 THERAPEUTIC EXERCISES: CPT | Performed by: PHYSICAL THERAPIST

## 2024-11-14 NOTE — PROGRESS NOTES
Physical Therapy Reassessment  [address] [facility] [address]    [place] [address]  Patient: Maren Ray   : 1985  Diagnosis/ICD-10 Code:  Chronic left-sided low back pain with left-sided sciatica [M54.42, G89.29]  Referring practitioner: Sariah Harrington PA-C  Date of Initial Visit: 2024  Today's Date: 2024  Patient seen for 16 sessions         Visit Diagnoses:    ICD-10-CM ICD-9-CM   1. Chronic left-sided low back pain with left-sided sciatica  M54.42 724.2    G89.29 724.3     338.29       Subjective Questionnaire: Oswestry: =24% impairment   Clinical Progress: improved  Home Program Compliance: Yes  Treatment has included: therapeutic exercise, neuromuscular re-education, manual therapy, and therapeutic activity      Subjective   Maren Ray reports: Has really started to see improvement in symptoms w/ combination of chiro and PT. Still has daily tingling in foot and back of knee, but much less intense, takes longer to come on, and doesn't linger as long. Pn in back/leg 4/10 at worst. Gets fatigue w/ activity, but usually takes an hour or two of housework. Not leaning on sink as much for support.    Pre tx pn score: 0  Post tx pn score: 0    Treatment  See Exercise, Manual, and Modality Logs for complete treatment.     Objective          Tenderness     Additional Tenderness Details  Min L lumbar PS, gluteal mm, prox/mid hamstrings    Neurological Testing     Sensation     Lumbar   Left   Intact: light touch    Right   Intact: light touch    Reflexes   Left   Patellar (L4): normal (2+)  Achilles (S1): absent (0)    Right   Patellar (L4): normal (2+)  Achilles (S1): normal (2+)    Active Range of Motion     Additional Active Range of Motion Details  Trunk Flxn WNL, no inc pn, slight stretch posterior L leg /knee  Trunk Extn WNL w/o pn  Trunk Rotation WNL, no pn  Trunk SB WNL no pn    Strength/Myotome Testing     Left Hip   Planes of Motion   Flexion: 5    Right Hip   Planes  of Motion   Flexion: 5    Left Knee   Flexion: 5  Extension: 5    Right Knee   Flexion: 5  Extension: 5    Left Ankle/Foot   Dorsiflexion: 5  Plantar flexion: 5  Great toe extension: 5    Right Ankle/Foot   Dorsiflexion: 5  Plantar flexion: 5  Great toe extension: 5    Tests       Thoracic   Positive slump (no LLE tightness, mild tightness R hip).     Lumbar     Left   Positive crossed SLR and passive SLR (30 deg hip flxn).             Assessment & Plan       Assessment  Assessment details: Reassessment performed. Pt has completed 16 PT visits. Pt reports continued improvement in frequency/intensity of low back pn and LLE paresthesia, improved tolerance to sitting, standing, walking-now tolerates up to 2 hours of housework prior to symptom onset, pn no worse than 4/10. Demonstrates full and pn free trunk mobility, 5/5 hip strength. RLE strength improved, trunk mobility less painful. Mod Oswestry score improved 10% since last reassessment and 34% overall since initial visit. Cont to exhibits (+) neural tension tests, fatigue/decreased endurance w/ core driven strengthening activities. Progressing very well toward remaining goals and would benefit from ongoing PT to improve strength and improve activity tolerance.    Goals  Plan Goals: STG 3wks  1) Pt to be compliant w/ initial HEP for ROM, stretching, and symptom mgmt.MET  2) Pt to report pn at rest to be no greater than 5/10.-MET  3) Pt to improve LLE strength to 4/5 or better in all planes.-MET  4) Pt to improve Mod Oswestry score to 20/50 or better to reflect improved pn and function.-MET    LTG 6wks  1) Pt to be independent w/ long term HEP and self mgmt.-ONGOING  2) Pt to report back/leg pn w/ ADLs to be no greater than 2/10.-PROGRESSING  3) Pt to demo full and pn free trunk mobility in all planes.-MET  4) Pt to improve LLE strength to 4+/5 or better in all planes.-MET  5) Pt to improve Mod Oswestry score to 12/50 or better to reflect improved pn and  function.-MET      Plan  Plan details: Cont w/ focus on core/hip strengthening, neurodynamics        Progress toward previous goals: Partially Met        Timed:         Manual Therapy:    0     mins  99012;     Therapeutic Exercise:    10     mins  13638;     Neuromuscular Derrick:    10    mins  24033;    Therapeutic Activity:     30     mins  13198;     Gait Trainin     mins  44150;     Ultrasound:     0     mins  73114;    Ionto                               0    mins   01040  Self Care                       0     mins   43215  Canalith Repos    0     mins 08701  Work Conditioning 1-2 hours    0    mins 06532  Work Conditioning ea add'l hour    0   mins 06009    Un-Timed:  Electrical Stimulation:    0     mins  07263 ( );  Dry Needling     0     mins self-pay  Traction     0     mins 13082  Re-Eval                           0    mins  86602      Timed Treatment:   50   mins   Total Treatment:     50   mins          PT: Asya Dong, PT     [place] License: #[id number]    Electronically signed by Asya Dong, PT, 24, 8:45 AM EST

## 2024-11-18 ENCOUNTER — TREATMENT (OUTPATIENT)
Dept: PHYSICAL THERAPY | Facility: CLINIC | Age: 39
End: 2024-11-18
Payer: COMMERCIAL

## 2024-11-18 DIAGNOSIS — M54.42 CHRONIC LEFT-SIDED LOW BACK PAIN WITH LEFT-SIDED SCIATICA: Primary | ICD-10-CM

## 2024-11-18 DIAGNOSIS — G89.29 CHRONIC LEFT-SIDED LOW BACK PAIN WITH LEFT-SIDED SCIATICA: Primary | ICD-10-CM

## 2024-11-18 PROCEDURE — 97112 NEUROMUSCULAR REEDUCATION: CPT | Performed by: PHYSICAL THERAPIST

## 2024-11-18 PROCEDURE — 97110 THERAPEUTIC EXERCISES: CPT | Performed by: PHYSICAL THERAPIST

## 2024-11-18 NOTE — PROGRESS NOTES
Physical Therapy Daily Treatment Note                  [address] [facility] [address]  [place] [address]      Patient: Maren Ray   : 1985  Diagnosis/ICD-10 Code:  Chronic left-sided low back pain with left-sided sciatica [M54.42, G89.29]  Referring practitioner: Sariah Harrington PA-C  Date of Initial Visit: Type: THERAPY  Noted: 2024  Today's Date: 2024  Patient seen for 17 sessions             Subjective   Maren Ray reports: having some tingling in the left foot today. Did a lot over the weekend. Have become a lot more active now that the pain and symptoms have gone down significantly. Feel like a lot of her symptoms today are coming from arthritic stuff irritating the nerve, not the same feeling as before.     Scale 0-10 for N/T left le/10    Objective   See Exercise, Manual, and Modality Logs for complete treatment.       Assessment/Plan  Pt noted with minimal left leg N/T today. She has been able to manage symptoms well for the past week but noted more irritation today possibly from weekend activities. Still able to complete all normal exercises with addition of table plank with alternating arm reaches.   Progress per Plan of Care and Progress strengthening /stabilization /functional activity           Timed:  Manual Therapy:         mins  16648;  Therapeutic Exercise:    28     mins  01521;     Neuromuscular Derrick:   12    mins  24707;    Therapeutic Activity:          mins  80926;     Gait Training:           mins  81952;     Ultrasound:          mins  04351;    Electrical Stimulation:         mins  90141;  Iontophoresis          mins  68504;  Work Conditioning 1-2 hr ___ mins 19630;  Work Conditioning ea additional hr __ mins 23500    Untimed:  Electrical Stimulation:         mins  48699 ( );  Mechanical Traction:         mins  16427;   Fluidotherapy          mins  08193    Timed Treatment:   40   mins   Total Treatment:     40    mins        Asya Villafuerte, PTA  Physical Therapist Assistant

## 2024-11-20 ENCOUNTER — OFFICE VISIT (OUTPATIENT)
Dept: NEUROSURGERY | Facility: CLINIC | Age: 39
End: 2024-11-20
Payer: COMMERCIAL

## 2024-11-20 VITALS
DIASTOLIC BLOOD PRESSURE: 76 MMHG | HEIGHT: 66 IN | BODY MASS INDEX: 47.09 KG/M2 | TEMPERATURE: 97.7 F | SYSTOLIC BLOOD PRESSURE: 124 MMHG | WEIGHT: 293 LBS

## 2024-11-20 DIAGNOSIS — M51.362 DEGENERATION OF INTERVERTEBRAL DISC OF LUMBAR REGION WITH DISCOGENIC BACK PAIN AND LOWER EXTREMITY PAIN: ICD-10-CM

## 2024-11-20 DIAGNOSIS — M54.16 LUMBAR RADICULOPATHY: Primary | ICD-10-CM

## 2024-11-20 PROCEDURE — 99213 OFFICE O/P EST LOW 20 MIN: CPT

## 2024-11-20 NOTE — PROGRESS NOTES
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  She developed left hip and lower extremity pain that extended down into the back of her left calf and heel.  She had a rather painful epidural steroid injection with Dr. Morales on 2024.  She has been working with physical therapy and chiropractic who have greatly made difference in her back pain and functionality.  She recently saw Simone Redman PA-C, who increased her 300 mg of gabapentin 3 times daily to 600 mg 3 times daily.  She often misses her afternoon dose and is realistically taking 600 mg in the morning and at night.  She feels much better overall.    Review of Systems   Constitutional:  Negative for activity change, appetite change, chills, diaphoresis, fatigue, fever and unexpected weight change.   HENT:  Negative for congestion, dental problem, drooling, ear discharge, ear pain, facial swelling, hearing loss, mouth sores, nosebleeds, postnasal drip, rhinorrhea, sinus pressure, sinus pain, sneezing, sore throat, tinnitus, trouble swallowing and voice change.    Eyes:  Negative for photophobia, pain, discharge, redness, itching and visual disturbance.   Respiratory:  Negative for apnea, cough, choking, chest tightness, shortness of breath, wheezing and stridor.    Cardiovascular:  Negative for chest pain, palpitations and leg swelling.   Gastrointestinal:  Negative for abdominal distention, abdominal pain, anal bleeding, blood in stool, constipation, diarrhea, nausea, rectal pain and vomiting.   Endocrine: Negative for cold intolerance, heat intolerance, polydipsia, polyphagia and polyuria.   Genitourinary:  Negative for decreased urine volume, difficulty urinating, dyspareunia, dysuria, enuresis, flank  "pain, frequency, genital sores, hematuria, menstrual problem, pelvic pain, urgency, vaginal bleeding, vaginal discharge and vaginal pain.   Musculoskeletal:  Positive for arthralgias. Negative for back pain, gait problem, joint swelling, myalgias, neck pain and neck stiffness.   Skin:  Negative for color change, pallor, rash and wound.   Allergic/Immunologic: Negative for environmental allergies, food allergies and immunocompromised state.   Neurological:  Negative for dizziness, tremors, seizures, syncope, facial asymmetry, speech difficulty, weakness, light-headedness, numbness and headaches.   Hematological:  Negative for adenopathy. Does not bruise/bleed easily.   Psychiatric/Behavioral:  Negative for agitation, behavioral problems, confusion, decreased concentration, dysphoric mood, hallucinations, self-injury, sleep disturbance and suicidal ideas. The patient is not nervous/anxious and is not hyperactive.    All other systems reviewed and are negative.       The patient's past medical history, past surgical history, family history, and social history have been reviewed at length in the electronic medical record.    Physical examination:  Blood pressure 124/76, temperature 97.7 °F (36.5 °C), temperature source Temporal, height 167.6 cm (66\"), weight (!) 169 kg (372 lb 9.6 oz), last menstrual period 10/12/2024, not currently breastfeeding.    Constitutional: The patient is well-developed.  She is obese.  She is pleasant and is in no acute distress.    HEENT:  normocephalic, atraumatic, sclera clear    MSK: Straight leg testing is negative bilaterally.  Mike signs are negative bilaterally.  Tenderness to palpation in the midline lumbar spine.    Neurological Exam   A/A/C, speech clear, attention normal, conversant, answers questions appropriately, good historian.  Motor examination does not reveal weakness in the , upper or lower extremities.   Sensation is intact.  Gait is normal, balance is normal.   No " tremors are noted.  Reflexes are intact, 1+  Rios is negative. Clonus is negative.     Medical Decision Making  Diagnoses and all orders for this visit:    1. Lumbar radiculopathy (Primary)    2. Degeneration of intervertebral disc of lumbar region with discogenic back pain and lower extremity pain    Ms. Ray is doing well with a combination of medicines, physical therapy, and chiropractic.  Presently there is no role for neurosurgical intervention.  She has a follow-up appointment with pain management in early January.  She will follow-up with me in about 3 months to assess her progress.  If she continues to do well, it will likely be our final visit.  The patient is interested in pursuing aquatic therapy again and I have encouraged her to do this and listen to her body as she slowly resumes activities she had not been able to do with all of her back pain.      Any copied data from previous notes included in the (1) HPI, (2) PE, (3) MDM and/or assessment and plan has been reviewed and is accurate as of this day.    Noelle English PA-C     Patient Care Team:  Mimi Bliss MD as PCP - General (Family Medicine)  Gentry Doherty MD as Consulting Physician (Neurosurgery)

## 2024-11-25 ENCOUNTER — TREATMENT (OUTPATIENT)
Dept: PHYSICAL THERAPY | Facility: CLINIC | Age: 39
End: 2024-11-25
Payer: COMMERCIAL

## 2024-11-25 DIAGNOSIS — G89.29 CHRONIC LEFT-SIDED LOW BACK PAIN WITH LEFT-SIDED SCIATICA: Primary | ICD-10-CM

## 2024-11-25 DIAGNOSIS — M54.42 CHRONIC LEFT-SIDED LOW BACK PAIN WITH LEFT-SIDED SCIATICA: Primary | ICD-10-CM

## 2024-11-25 PROCEDURE — 97110 THERAPEUTIC EXERCISES: CPT | Performed by: PHYSICAL THERAPIST

## 2024-11-25 PROCEDURE — 97112 NEUROMUSCULAR REEDUCATION: CPT | Performed by: PHYSICAL THERAPIST

## 2024-11-25 PROCEDURE — 97530 THERAPEUTIC ACTIVITIES: CPT | Performed by: PHYSICAL THERAPIST

## 2024-11-25 NOTE — PROGRESS NOTES
Physical Therapy Daily Treatment Note  [address] [facility]  [address]   [place] [address]      Patient: Maren Ray   : 1985  Referring practitioner: Sariah Harrington PA-C  Date of Initial Visit: Type: THERAPY  Noted: 2024  Today's Date: 2024  Patient seen for 18 sessions       Visit Diagnoses:    ICD-10-CM ICD-9-CM   1. Chronic left-sided low back pain with left-sided sciatica  M54.42 724.2    G89.29 724.3     338.29       Subjective   Maren Ray reports: Had a rough week last week. Started to feel increased pn in low back, nerve pn in legs a couple of hours after her PT appt. Improved gradually w/ each day.     Pre tx pn score: 4/10 midline LBP  Post tx pn score: 2    Treatment  See Exercise, Manual, and Modality Logs for complete treatment.     Objective         Assessment & Plan       Assessment  Assessment details: More sensitive to neurodynamics today but improved w/ reps. Deferred progression of exercise after mild flareup following last visit. Continued w/ recent addition of table planks but emphasized technique. Removed twist from paloff press and focus on anti rotation isometric w/ cues for trans ab activation. Pn slightly reduced by end of visit. Given overall progress will plan to reduce frequency to 1x every other week unless she regresses.    Plan  Plan details: Cont at dec frequency w/ focus on progressive core stab and neurodynamics          Timed:         Manual Therapy:    0     mins  79487;     Therapeutic Exercise:    30     mins  21610;     Neuromuscular Derrick:    10    mins  65652;    Therapeutic Activity:     10     mins  79786;     Gait Trainin     mins  80294;     Ultrasound:     0     mins  04819;    Ionto                               0    mins   09080  Self Care                       0     mins   08698  Canalith Repos    0     mins 13489  Work Conditioning 1-2 hours    0    mins 07066  Work Conditioning ea add'l hour    0   mins  94502    Un-Timed:  Electrical Stimulation:    0     mins  90748 ( );  Dry Needling     0     mins self-pay  Traction     0     mins 55484      Timed Treatment:   50   mins   Total Treatment:     50   mins     Asya Dong, PT  [place] License: #[id number]

## 2024-12-09 ENCOUNTER — TREATMENT (OUTPATIENT)
Dept: PHYSICAL THERAPY | Facility: CLINIC | Age: 39
End: 2024-12-09
Payer: COMMERCIAL

## 2024-12-09 DIAGNOSIS — M54.42 CHRONIC LEFT-SIDED LOW BACK PAIN WITH LEFT-SIDED SCIATICA: Primary | ICD-10-CM

## 2024-12-09 DIAGNOSIS — G89.29 CHRONIC LEFT-SIDED LOW BACK PAIN WITH LEFT-SIDED SCIATICA: Primary | ICD-10-CM

## 2024-12-09 PROCEDURE — 97112 NEUROMUSCULAR REEDUCATION: CPT

## 2024-12-09 PROCEDURE — 97140 MANUAL THERAPY 1/> REGIONS: CPT

## 2024-12-09 PROCEDURE — 97110 THERAPEUTIC EXERCISES: CPT

## 2024-12-09 NOTE — PROGRESS NOTES
Physical Therapy Daily Treatment Note                  [address] [facility] [address]  [place] [address]      Patient: Maren Ray   : 1985  Diagnosis/ICD-10 Code:  Chronic left-sided low back pain with left-sided sciatica [M54.42, G89.29]  Referring practitioner: Sariah Harrington PA-C  Date of Initial Visit: Type: THERAPY  Noted: 2024  Today's Date: 2024  Patient seen for 19 sessions             Subjective   Maren Ray reports: had a hard week due to spouse having seizure which has made her have to do more such as driving, sleeping on different bed with traveling for holidays, etc. R leg has been painful down to the back of the knee, no N/T. L leg has been feeling a lot better and just get some pain in the hip at times.     3/10 R leg pain    Objective   TTP, trigger point R QL  See Exercise, Manual, and Modality Logs for complete treatment.       Assessment/Plan  Able to control the nerve pain in posterior thigh with PPT during some activities. Noticeable nodule/tone in the R QL that radiated into posterior thigh with palpation. Able to decrease symptoms with manual STM/release of the QL. She will continue LTR and light stretching today and tomorrow. Possibly increase in symptoms from driving long hours without stopping and uncomfortable sleeping arrangements due to 's condition. Did not complete twisting activities as the increased pain in leg. Overall, improved symptoms with therapy treatment.   Progress per Plan of Care and Progress strengthening /stabilization /functional activity           Timed:  Manual Therapy:    12     mins  44240;  Therapeutic Exercise:    27     mins  32374;     Neuromuscular Derrick:   15    mins  48439;    Therapeutic Activity:          mins  26714;     Gait Training:           mins  41333;     Ultrasound:          mins  58856;    Electrical Stimulation:         mins  35940;  Iontophoresis          mins  73869;  Work  Conditioning 1-2 hr ___ mins 10391;  Work Conditioning ea additional hr ___ mins 30212    Untimed:  Electrical Stimulation:         mins  54282 ( );  Mechanical Traction:         mins  16035;   Fluidotherapy          mins  91666    Timed Treatment:   54   mins   Total Treatment:     54   mins        Asya Villafuerte PTA  Physical Therapist Assistant

## 2024-12-13 RX ORDER — METHOCARBAMOL 750 MG/1
750 TABLET, FILM COATED ORAL 3 TIMES DAILY PRN
Qty: 90 TABLET | Refills: 0 | Status: SHIPPED | OUTPATIENT
Start: 2024-12-13

## 2024-12-17 ENCOUNTER — TREATMENT (OUTPATIENT)
Dept: PHYSICAL THERAPY | Facility: CLINIC | Age: 39
End: 2024-12-17
Payer: COMMERCIAL

## 2024-12-17 ENCOUNTER — TELEPHONE (OUTPATIENT)
Dept: NEUROSURGERY | Facility: CLINIC | Age: 39
End: 2024-12-17
Payer: COMMERCIAL

## 2024-12-17 DIAGNOSIS — M54.16 LUMBAR RADICULOPATHY: Primary | ICD-10-CM

## 2024-12-17 DIAGNOSIS — G89.29 CHRONIC LEFT-SIDED LOW BACK PAIN WITH LEFT-SIDED SCIATICA: Primary | ICD-10-CM

## 2024-12-17 DIAGNOSIS — M54.42 CHRONIC LEFT-SIDED LOW BACK PAIN WITH LEFT-SIDED SCIATICA: Primary | ICD-10-CM

## 2024-12-17 DIAGNOSIS — M51.362 DEGENERATION OF INTERVERTEBRAL DISC OF LUMBAR REGION WITH DISCOGENIC BACK PAIN AND LOWER EXTREMITY PAIN: ICD-10-CM

## 2024-12-17 PROCEDURE — 97140 MANUAL THERAPY 1/> REGIONS: CPT | Performed by: PHYSICAL THERAPIST

## 2024-12-17 PROCEDURE — 97110 THERAPEUTIC EXERCISES: CPT | Performed by: PHYSICAL THERAPIST

## 2024-12-17 PROCEDURE — 97530 THERAPEUTIC ACTIVITIES: CPT | Performed by: PHYSICAL THERAPIST

## 2024-12-17 RX ORDER — METHYLPREDNISOLONE 4 MG/1
TABLET ORAL
Qty: 21 TABLET | Refills: 0 | Status: SHIPPED | OUTPATIENT
Start: 2024-12-17

## 2024-12-17 NOTE — PROGRESS NOTES
"                          Physical Therapy Daily Treatment Note                  [address] [facility] [address]  [place] [address]      Patient: Maren Ray   : 1985  Diagnosis/ICD-10 Code:  Chronic left-sided low back pain with left-sided sciatica [M54.42, G89.29]  Referring practitioner: Sariah Harrington PA-C  Date of Initial Visit: Type: THERAPY  Noted: 2024  Today's Date: 2024  Patient seen for 20 sessions             Subjective   Maren Ray reports: right side has still been bothering her since spouses incident and traveling. \"Feels like my right leg is bruised.\" L leg has been doing fine.     Objective          Palpation     Right Tenderness of the gluteus medius, quadratus lumborum and TFL.     Tenderness     Right Hip   Tenderness in the greater trochanter.     Right Knee   Tenderness in the ITB.        See Exercise, Manual, and Modality Logs for complete treatment.       Assessment/Plan  Able to complete some R hip stretching down the right side of the leg but did not tolerate positions for long due to the low back. Started with STM of the right side followed by light stretching. Did not tolerate normal exercises but did well with planking activities and Tb pulldown. She will continue to trial light stretching of the right hip through the next week as tolerated. Encouraged her to continue use of modalities as needed.   Progress per Plan of Care and Progress strengthening /stabilization /functional activity           Timed:  Manual Therapy:    15     mins  60960;  Therapeutic Exercise:    17     mins  12758;     Neuromuscular Derrick:        mins  54841;    Therapeutic Activity:     8     mins  89943;     Gait Training:           mins  89327;     Ultrasound:          mins  97065;    Electrical Stimulation:         mins  48319;  Iontophoresis          mins  89958;  Work Conditioning 1-2 hr ___ mins 57888;  Work Conditioning ea additional hr ___ mins " 31490    Untimed:  Electrical Stimulation:         mins  27588 ( );  Mechanical Traction:         mins  94188;   Fluidotherapy          mins  12403    Timed Treatment:   40   mins   Total Treatment:     40   mins        Asya Villafuerte PTA  Physical Therapist Assistant

## 2024-12-17 NOTE — TELEPHONE ENCOUNTER
"Provider:  Amador  Surgery/Procedure:  KARIE  Surgery/Procedure Date:    Last visit:   11/20/24  Next visit: 2/11/25     Reason for call:  Patient reports her left sided pain is resolved, however about 2 weeks ago she began to have muscle and nerve pain in her right hip down laterally to her knee. This is worse when going from a laying position to sitting, and occasionally while laying she gets a shooting \"zing\" pain down her leg. She's in physical therapy, and her therapist thinks there is some more tightness in her hip. She denies any new numbness or tingling, or any weakness. Currently is taking gabapentin 600mg TID, and methocarbamol as needed - does not feel that the muscle relaxer is giving much benefit.   "

## 2024-12-30 ENCOUNTER — TREATMENT (OUTPATIENT)
Dept: PHYSICAL THERAPY | Facility: CLINIC | Age: 39
End: 2024-12-30
Payer: COMMERCIAL

## 2024-12-30 DIAGNOSIS — M54.42 CHRONIC LEFT-SIDED LOW BACK PAIN WITH LEFT-SIDED SCIATICA: Primary | ICD-10-CM

## 2024-12-30 DIAGNOSIS — G89.29 CHRONIC LEFT-SIDED LOW BACK PAIN WITH LEFT-SIDED SCIATICA: Primary | ICD-10-CM

## 2024-12-30 PROCEDURE — 97530 THERAPEUTIC ACTIVITIES: CPT | Performed by: PHYSICAL THERAPIST

## 2024-12-30 PROCEDURE — 97140 MANUAL THERAPY 1/> REGIONS: CPT | Performed by: PHYSICAL THERAPIST

## 2024-12-30 PROCEDURE — 97110 THERAPEUTIC EXERCISES: CPT | Performed by: PHYSICAL THERAPIST

## 2024-12-30 NOTE — PROGRESS NOTES
Physical Therapy Re Certification Of Plan of Care  [address] [facility] [address]    [place] [address]  [phone]  Patient: Maren Ray   : 1985  Diagnosis/ICD-10 Code:  Chronic left-sided low back pain with left-sided sciatica [M54.42, G89.29]  Referring practitioner: Sariah Harrington PA-C  Date of Initial Visit: Type: THERAPY  Noted: 2024  Today's Date: 2024  Patient seen for 21 sessions         Visit Diagnoses:    ICD-10-CM ICD-9-CM   1. Chronic left-sided low back pain with left-sided sciatica  M54.42 724.2    G89.29 724.3     338.29       Subjective Questionnaire: Oswestry: =42% impairment   Clinical Progress: worse  Home Program Compliance: Yes  Treatment has included: therapeutic exercise, neuromuscular re-education, manual therapy, and therapeutic activity    Subjective   Maren Ray reports: Had been doing well up until  when she spent a lot of time in the car driving and sleeping in different beds. Developed R leg pn/paresthesia. Spoke w/ neurosurgery who called in steroids. Helped while she was on them but pn returned afterward and seems to be worsening. Reports pn, N/T that radiates down the back of her leg to level of her knee. When symptoms intense can travel into her lateral lower leg. Worse w/ getting up out of a chair, twisting/rolling, laying down. L leg symptoms have been well controlled. No longer experiencing LLE paresthesia. When laying down will feel some L hip pn, can relieve by applying pressure into her L low back. F/u w/ pn mgmt later this week.     Pre tx pn score: 4-post R hip/thigh  Post tx pn score: 4        Treatment  See Exercise, Manual, and Modality Logs for complete treatment.    Objective          Tenderness     Additional Tenderness Details  Min L lumbar PS, gluteal mm, prox/mid hamstrings    Neurological Testing     Sensation     Lumbar   Left   Intact: light touch    Right   Intact: light touch    Reflexes   Left    Patellar (L4): normal (2+)  Achilles (S1): absent (0)    Right   Patellar (L4): normal (2+)  Achilles (S1): normal (2+)    Active Range of Motion     Additional Active Range of Motion Details  Trunk Flxn WNL, no inc pn, stretch post chain R>L  Trunk Extn WNL post R thigh pn  Trunk Rotation WNL, inc R leg pn/paresthesia w/ L rotation  Trunk SB WNL, some pn w/ return to upright from L SB    Strength/Myotome Testing     Left Hip   Planes of Motion   Flexion: 5    Right Hip   Planes of Motion   Flexion: 4+    Left Knee   Flexion: 5  Extension: 5    Right Knee   Flexion: 5  Extension: 5    Left Ankle/Foot   Dorsiflexion: 5  Plantar flexion: 5  Great toe extension: 5    Right Ankle/Foot   Dorsiflexion: 5  Plantar flexion: 5  Great toe extension: 5    Tests       Thoracic   Positive slump (RLE-inc pn w/ cervical flxn, some relief w/ extn).     Lumbar     Left   Positive crossed SLR and passive SLR (30 deg hip flxn).     Right   Positive crossed SLR and passive SLR.     Additional Tests Details  Prone lying mild L post thigh/lateral shin pn  MINA peripheralizes symptoms to foot  PA glides L4/5 peripheralizes to foot  Prone traction centralizes            Assessment & Plan       Assessment  Impairments: abnormal or restricted ROM, activity intolerance and pain with function   Functional limitations: lifting, pushing, uncomfortable because of pain, moving in bed, sitting, stooping and unable to perform repetitive tasks   Assessment details: 90 day re-cert performed. Pt has completed 21 PT visits. Pt had experienced marked relief of back and LLE symptoms, but approx 1 month ago developed severe pn in R leg after traveling for [place] [place] [employer] and sleeping in a different bed. LLE symptoms resolved w/ exception of mild L hip pn when lying down. Trunk mobility still WNL, but reports onset of RLE pn w/ some directions. BLE strength intact. Sensation intact at time of examination today. Neural tension tests (-) on L, (+) on R w/  sciatic bias. Did experience nearly full symptom relief w/ prone traction. Extn patterns seemed to peripheralize symptoms. Pt would benefit from ongoing PT to address current symptoms and restore function.  Prognosis: good    Goals  Plan Goals: STG 3wks  1) Pt to be compliant w/ initial HEP for ROM, stretching, and symptom mgmt.MET  2) Pt to report pn at rest to be no greater than 5/10.-MET LLE  3) Pt to improve LLE strength to 4/5 or better in all planes.-MET  4) Pt to improve Mod Oswestry score to 20/50 or better to reflect improved pn and function.-previously MET, currently unmet    LTG 6wks  1) Pt to be independent w/ long term HEP and self mgmt.-ONGOING  2) Pt to report back/leg pn w/ ADLs to be no greater than 2/10.-PROGRESSING  3) Pt to demo full and pn free trunk mobility in all planes.-previously MET; regressed since last assessment  4) Pt to improve LLE strength to 4+/5 or better in all planes.-MET  5) Pt to improve Mod Oswestry score to 12/50 or better to reflect improved pn and function.-previously met; regressed 2/2 onset RLE symptoms      Plan  Therapy options: will be seen for skilled therapy services  Planned modality interventions: cryotherapy, TENS and thermotherapy (hydrocollator packs)  Planned therapy interventions: abdominal trunk stabilization, flexibility, functional ROM exercises, home exercise program, manual therapy, neuromuscular re-education, postural training, strengthening, stretching and therapeutic activities  Frequency: 2x week  Duration in weeks: 12  Plan details: Cont w/ focus on core/hip strengthening, neurodynamics, manual traction for symptom relief           Recommendations: Continue as planned  Timeframe: 3 months  Prognosis to achieve goals: good      Timed:         Manual Therapy:    8     mins  39244;     Therapeutic Exercise:    12     mins  08753;     Neuromuscular Derrick:    0    mins  67043;    Therapeutic Activity:     30     mins  64900;     Gait Trainin      mins  57116;     Ultrasound:     0     mins  73057;    Ionto                               0    mins   77326  Self Care                       0     mins   99290  Work Conditioning 1-2 hours    0    mins 50011  Work Conditioning ea add'l hour    0   mins 94063    Un-Timed:  Electrical Stimulation:    0     mins  86798 ( );  Dry Needling     0     mins self-pay  Traction     0     mins 32055  Re-Eval                           0    mins  07359  Canalith Repos    0     mins 65103    Timed Treatment:   50   mins   Total Treatment:     50   mins          PT: Asya Dong PT     [place] License:  [id number]    Electronically signed by Asya Dong, PT, 12/30/24, 8:33 AM EST    Certification Period: 12/30/2024 thru 3/29/2025  I certify that the therapy services are furnished while this patient is under my care.  The services outlined above are required by this patient, and will be reviewed every 90 days.         Physician Signature:__________________________________________________    PHYSICIAN: Sariah Harrington PA-C  NPI: [id number]                                      DATE:  :     Please sign and return via fax to [email or web address] . Thank you, [facility]

## 2025-01-02 ENCOUNTER — OFFICE VISIT (OUTPATIENT)
Dept: PAIN MEDICINE | Facility: CLINIC | Age: 40
End: 2025-01-02
Payer: COMMERCIAL

## 2025-01-02 ENCOUNTER — TREATMENT (OUTPATIENT)
Dept: PHYSICAL THERAPY | Facility: CLINIC | Age: 40
End: 2025-01-02
Payer: COMMERCIAL

## 2025-01-02 VITALS — WEIGHT: 293 LBS | BODY MASS INDEX: 47.09 KG/M2 | HEIGHT: 66 IN

## 2025-01-02 DIAGNOSIS — M54.16 LUMBAR RADICULOPATHY: ICD-10-CM

## 2025-01-02 DIAGNOSIS — M54.42 CHRONIC LEFT-SIDED LOW BACK PAIN WITH LEFT-SIDED SCIATICA: Primary | ICD-10-CM

## 2025-01-02 DIAGNOSIS — G89.29 CHRONIC LEFT-SIDED LOW BACK PAIN WITH LEFT-SIDED SCIATICA: Primary | ICD-10-CM

## 2025-01-02 DIAGNOSIS — M54.16 LUMBAR RADICULOPATHY: Primary | ICD-10-CM

## 2025-01-02 PROCEDURE — 99214 OFFICE O/P EST MOD 30 MIN: CPT

## 2025-01-02 RX ORDER — GABAPENTIN 600 MG/1
600 TABLET ORAL 3 TIMES DAILY
Qty: 90 TABLET | Refills: 3 | Status: SHIPPED | OUTPATIENT
Start: 2025-01-02

## 2025-01-02 NOTE — TELEPHONE ENCOUNTER
Refill gabapentin  Gabapentin 600 mg 3 times daily, 90 pills, #3 refills  [place] reviewed and compliant  Compliant UDS  Controlled substance agreement signed in office  Appropriate follow-up visit scheduled

## 2025-01-02 NOTE — PROGRESS NOTES
Referring Physician: No referring provider defined for this encounter.    Primary Physician: Mimi Bliss MD    CHIEF COMPLAINT or REASON FOR VISIT: Follow-up, Back Pain, and Med Management      Initial history of present illness on 09/16/2024:  Ms. Maren Ray is 39 y.o. female who presents as a new patient referral for evaluation and treatment of a 2-month history of low back pain with radiation to left lower extremity.  Ms. Ray was in her normal state of health without any history of spinal surgery when, in July, without any inciting event or trauma she developed new onset left-sided low back pain with radiation to left lower extremity and plantar surface of foot.  She does have a past history of fibromyalgia which manifests as diffuse muscle pain primarily in the shoulders and neck.  She has been engaged in physical therapy for this issue and has additionally seen Dr. Doherty, neurosurgery, who started her on gabapentin 3 times a day.  Patient notes benefit with gabapentin and physical therapy.  She continues to complain of primarily a numbness type pain radiating down the left side of her leg to the bottom of her foot.  Patient denies any bowel or bladder dysfunction, lower extremity weakness, new onset saddle anesthesia or unexplained weight loss.       Interval history:  Patient returns to clinic today for medication management.  She has previously undergone a lumbar interlaminar epidural steroid injection with good relief of her left lower extremity pain.  Unfortunately, during this procedure she had a significant pain which did resolve.  She was reportedly doing very well until around Thanksgiving.  She had a long drive which did exacerbate her symptoms.  She is not particularly complaining of any pain within her left lower extremity.  She primary complaints of chronic back pain with radiation to the right lower extremity.  She denies any new onset bowel or bladder dysfunction or saddle  anesthesia.  She continues to take gabapentin 600 mg 3 times daily with relief.  Additionally, she has received a prescription for a muscle relaxer from neurosurgery with good relief.  She continues to participate in physical therapy.  We did discuss multiple treatment options today including continuing conservative measures with medication management as well as a repeat lumbar interlaminar epidural steroid injection.  Patient is a little cautious on undergoing repeat injections given previous experience.    Interventions:  9/26/2024: L5/S1 LESI 80% relief of left lower extremity pain.    Objective Pain Scoring:   BRIEF PAIN INVENTORY:  Total score:   Pain Score    01/02/25 0826   PainSc:   6   PainLoc: Back        PHQ-2:    PHQ-9:    Opioid Risk Tool:         Review of Systems:   ROS negative except as otherwise noted     Past Medical History:   Past Medical History:   Diagnosis Date    Acid reflux     Allergic     Anxiety     Arthritis     Breast cancer screening by mammogram 08/12/2024    Bronchitis     Chronic pain disorder     CTS (carpal tunnel syndrome)     Depression     Extremity pain     Left leg & hip    Fibromyalgia     Fibromyalgia, primary     Headache, tension-type     History of ear infections     Hypotension     Hypotension     Joint pain     Lumbosacral disc disease     Migraines     Neck pain     Obesity     Peripheral neuropathy     Shingles     Sinusitis     Sleep apnea     Spinal stenosis 08/2024    TMJ dysfunction     Urinary tract infection     Vaginal delivery 04/21/2017         Past Surgical History:   Past Surgical History:   Procedure Laterality Date    MOUTH SURGERY      WISDOM TOOTH EXTRACTION           Family History   Family History   Problem Relation Age of Onset    Chronic bronchitis Mother     Stroke Mother     Hypotension Mother     Restless legs syndrome Mother     Arthritis Mother     Sleep apnea Father     Obesity Father     Cancer Father     Diabetes Father     Hypertension  "Father     Arthritis Father     Obesity Brother     Diabetes Brother     Bipolar disorder Brother     Anxiety disorder Brother     Asthma Brother     Depression Brother     GI problems Brother         Gastroparesis    Early death Maternal Grandmother         Tuberculosis in late 30s    Heart disease Paternal Grandmother     Hypertension Paternal Grandmother     Kidney disease Paternal Grandmother     Cancer Paternal Grandmother     Ovarian cancer Paternal Aunt 70    Diabetes Paternal Grandfather     Cancer Paternal Grandfather     Breast cancer Neg Hx          Social History   Social History     Socioeconomic History    Marital status:      Spouse name: Rudy   Tobacco Use    Smoking status: Never     Passive exposure: Never    Smokeless tobacco: Never   Vaping Use    Vaping status: Never Used   Substance and Sexual Activity    Alcohol use: Never    Drug use: Never    Sexual activity: Yes     Partners: Male     Birth control/protection: None        Medications:     Current Outpatient Medications:     gabapentin (NEURONTIN) 600 MG tablet, Take 1 tablet by mouth 3 (Three) Times a Day., Disp: 90 tablet, Rfl: 1    levocetirizine (Xyzal Allergy 24HR) 5 MG tablet, Take 1 tablet by mouth Every Evening., Disp: , Rfl:     lidocaine (XYLOCAINE) 4 % external solution, Apply  topically to the appropriate area as directed As Needed for Mild Pain. OTC PATCH, Disp: , Rfl:     methocarbamol (ROBAXIN) 750 MG tablet, TAKE 1 TABLET BY MOUTH THREE TIMES A DAY AS NEEDED FOR MUSCLE SPASM, Disp: 90 tablet, Rfl: 0    sertraline (ZOLOFT) 50 MG tablet, TAKE 1 TABLET BY MOUTH EVERY DAY, Disp: 30 tablet, Rfl: 1        Physical Exam:     Vitals:    01/02/25 0826   Weight: (!) 163 kg (359 lb)   Height: 167.6 cm (65.98\")   PainSc:   6   PainLoc: Back          General: Alert and oriented, No acute distress.   HEENT: Normocephalic, atraumatic.   Cardiovascular: No gross edema.  Obese  Respiratory: Respirations are non-labored      Lumbar " Spine:   No masses or atrophy  Range of motion - Flexion reduced. Extension reduced.    Facet Loading: Negative bilaterally  Facet Palpation - Nontender   Carlos finger/Gaenslen's/Mike's/JANINA/Thigh thrust -   Straight leg raise/slump test: Positive left  Multifidus toe-touch test:    Motor Exam:      Strength: Rate on 1-5 scale Right Left    L1/2- hip flexion 5/5  5/5    L3- knee extension 5/5  5/5    L4- ankle dorsiflexion 5/5  5/5    L5- great toe extension 5/5  5/5    S1- ankle plantarflexion 5/5  5/5    Sensory Exam: Altered sensation left S1 dermatome      Neurologic: Cranial Nerves II-XII are grossly intact.     Psychiatric: Cooperative.   Gait: Normal   Assistive Devices: None    Imaging Studies:   No results found for this or any previous visit.        Independent review of radiographic imaging: Available for my interpretation is a lumbar MRI dated August 20, 2024 demonstrating straightening of the normal lordosis of the lumbar spine; central posterior disc protrusion at L5/S1 with bilateral lateral recess stenosis; multifidus atrophy.    Impression & Plan:       09/16/2024: Maren Ray is a 39 y.o. female with past medical history significant for depression, anxiety, sleep apnea, GERD, fibromyalgia, morbid obesity who presents to the pain clinic for evaluation and treatment of left-sided low back pain radiation to left lower extremity and foot.  MRI interpreted as above.  Evaluation consistent with lumbar radiculopathy.  We discussed epidural steroid injection to improve pain.  If greater than 50% relief for at least 2-3 months can consider repeat as needed every 3 to 4 months.  I had a discussion with the patient regarding the risks of the procedure including bleeding, infection, damage to surrounding structures.  We discussed the potential adverse effects of corticosteroid injection including flushing of the face, lipodystrophy, skin discoloration, elevated blood glucose, increased blood  pressure.  Risks of frequent steroid administration include weight gain, hormonal changes, mood changes, osteoporosis.  10/31/2024: Good relief from LESI.  Could contemplate repeat but would be cautious given pain during last procedure.  Will assume responsibility of gabapentin from NSA.  Will uptitrate gabapentin  2025: Will refill gabapentin.  Agree with Robaxin.  Patient politely declined any repeat LESI at the moment.    1. Lumbar radiculopathy            PLAN:  1. Medication Recommendations: Recommend Voltaren topical, NSAIDs, Tylenol.  Can trial turmeric 500 mg twice daily if NSAID contraindicated.  -Assume responsibility gabapentin from neurosurgery  -Agree with Robaxin  -Uptitrate gabapentin  -Gabapentin 600 mg 3 times daily, 90 pills, #3 refill  -As part of this patient's treatment plan, patient will be prescribed controlled substances. The patient has been made aware of appropriate use of such medications, including potential risk of somnolence, limited ability to drive and /or work safely, and potential for dependence or overdose. It has also been made clear that these medications are for use by this patient only, without concomitant use of alcohol or other substances unless prescribed.Controlled substance status of medication discussed with patient, discussed risks of medication including abuse potential and diversion potential and need to follow up for reevaluation appointment in order to receive further refills.  Grayson was reviewed and compliant.    2. Physical Therapy: Continue PT    3. Psychological: defer    4. Complementary and alternative (CAM) Therapies:     5. Labs/Diagnostic studies: UDS compliant    6. Imagin. Interventions: Could consider repeat left paramedian lumbar interlaminar epidural steroid injection (53970).  Previous success at L5/S1.  Would be cautious of repeat injection given patient's response and pain during the procedure  -Patient politely declined    8.  Referrals: None indicated     9. Records: Reviewed neurosurgical notes    10. Lifestyle goals:    Follow-up 4 months for medication management      [facility]  Therese Redman PA-C          Quality Metrics:

## 2025-01-02 NOTE — PROGRESS NOTES
Physical Therapy Daily Treatment Note                  [address] [facility] [address]  [place] [address]      Patient: Maren Ray   : 1985  Diagnosis/ICD-10 Code:  Chronic left-sided low back pain with left-sided sciatica [M54.42, G89.29]  Referring practitioner: Sariah Harrington PA-C  Date of Initial Visit: Type: THERAPY  Noted: 2024  Today's Date: 2025  Patient seen for 22 sessions             Subjective   Maren Ray reports: not as much pain in the right hip but dealing with the N/T in the right leg down to the big toe more this week. The left low back has been getting some spasms in it at night.     Objective          Palpation   Left   Hypertonic in the quadratus lumborum.   Tenderness of the lumbar paraspinals and quadratus lumborum.     Right   Hypertonic in the quadratus lumborum. Tenderness of the lumbar paraspinals and quadratus lumborum.       See Exercise, Manual, and Modality Logs for complete treatment.       Assessment/Plan  Able to relieve all the right leg symptoms with prone traction but immediately returned when standing up just not as strong. PPT did help relieve the pain on the left side of the low back but it stirs the R LE N/T. Able to show her how to complete self lumbar traction over her Swiss ball on a low table at home that relives the numbness.   Progress per Plan of Care and Progress strengthening /stabilization /functional activity           Timed:  Manual Therapy:    23     mins  82057;  Therapeutic Exercise:    15     mins  08414;     Neuromuscular Derrick:        mins  78696;    Therapeutic Activity:     10     mins  54067;     Gait Training:           mins  70102;     Ultrasound:          mins  93410;    Electrical Stimulation:        mins  04891;  Iontophoresis          mins  61225;  Work Conditioning 1-2 hr ___ mins 83522;  Work Conditioning ea additional hr ___ mins 84871    Untimed:  Electrical Stimulation:         mins   89741 ( );  Mechanical Traction:         mins  53106;   Fluidotherapy          mins  69333    Timed Treatment:   48   mins   Total Treatment:     48   mins        Asya Villafuerte PTA  Physical Therapist Assistant

## 2025-01-09 ENCOUNTER — TREATMENT (OUTPATIENT)
Dept: PHYSICAL THERAPY | Facility: CLINIC | Age: 40
End: 2025-01-09
Payer: COMMERCIAL

## 2025-01-09 DIAGNOSIS — M54.42 CHRONIC LEFT-SIDED LOW BACK PAIN WITH LEFT-SIDED SCIATICA: Primary | ICD-10-CM

## 2025-01-09 DIAGNOSIS — G89.29 CHRONIC LEFT-SIDED LOW BACK PAIN WITH LEFT-SIDED SCIATICA: Primary | ICD-10-CM

## 2025-01-09 NOTE — PROGRESS NOTES
Physical Therapy Daily Treatment Note                  [address] [facility] [address]  [place] [address]      Patient: Maren Ray   : 1985  Diagnosis/ICD-10 Code:  Chronic left-sided low back pain with left-sided sciatica [M54.42, G89.29]  Referring practitioner: Sariah Harrington PA-C  Date of Initial Visit: Type: THERAPY  Noted: 2024  Today's Date: 2025  Patient seen for 23 sessions             Subjective   Maren Ray reports: felt better for a few days after last session but came back with the spasms in the back especially at night lying down. N/T doesn't seem as bad in the legs and the pain in the right leg has centralized more to above the knee.     Objective          Palpation   Left   Hypertonic in the quadratus lumborum.   Tenderness of the gluteus jenise, lumbar paraspinals and quadratus lumborum.     Right   Hypertonic in the quadratus lumborum. Tenderness of the gluteus jenise, lumbar paraspinals and quadratus lumborum.       See Exercise, Manual, and Modality Logs for complete treatment.       Assessment/Plan  Pt was given the option per PT/PTA collaboration to introduce dry needling to decrease back spasms and pain at end of session as she was unable to get lasting results with manual and exercise. Able to decrease leg symptoms and tone across the low low back/hips post treatment. See PT note for more details.   Progress per Plan of Care and Progress strengthening /stabilization /functional activity           Timed:  Manual Therapy:    23     mins  29856;  Therapeutic Exercise:    15     mins  74578;     Neuromuscular Derrick:        mins  21972;    Therapeutic Activity:          mins  54151;     Gait Training:           mins  88517;     Ultrasound:          mins  80972;    Electrical Stimulation:         mins  78091;  Iontophoresis          mins  59329;  Work Conditioning 1-2 hr ___ mins 54992;  Work Conditioning ea additional hr ___ mins  74679    Untimed:  Electrical Stimulation:         mins  73929 ( );  Mechanical Traction:         mins  64382;   Fluidotherapy          mins  96544    Timed Treatment:   38   mins   Total Treatment:     38   mins        Asya Villafuerte PTA  Physical Therapist Assistant

## 2025-01-10 DIAGNOSIS — F32.A DEPRESSION, UNSPECIFIED DEPRESSION TYPE: ICD-10-CM

## 2025-01-10 DIAGNOSIS — F41.9 ANXIETY: ICD-10-CM

## 2025-01-10 RX ORDER — METHOCARBAMOL 750 MG/1
750 TABLET, FILM COATED ORAL 3 TIMES DAILY PRN
Qty: 90 TABLET | Refills: 0 | Status: SHIPPED | OUTPATIENT
Start: 2025-01-10

## 2025-01-10 NOTE — PROGRESS NOTES
Physical Therapy Daily Treatment Note  [address] [facility] [address]   [place] [address]  [phone]      Patient: Maren Ray   : 1985  Referring practitioner: Sariah Harrington PA-C  Date of Initial Visit: Type: THERAPY  Noted: 2024  Today's Date: 2025  Patient seen for 23 sessions       Visit Diagnoses:    ICD-10-CM ICD-9-CM   1. Chronic left-sided low back pain with left-sided sciatica  M54.42 724.2    G89.29 724.3     338.29       Subjective   Maren Ray reports: see PTA note for details      Objective   See Exercise, Manual, and Modality Logs for complete treatment.       Assessment & Plan       Assessment  Assessment details: Incorporated deep soft tissue myofascial manipulation to address complaints of ongoing muscle spasms in low back and B hips. Tolerated intervention well and reported dec pn afterward. Encouraged use of heat if needed for post-tx soreness. Pt verbalized understanding.     Plan  Plan details: Cont per POC          Timed:         Manual Therapy:    8     mins  22214;     Therapeutic Exercise:    0     mins  94735;     Neuromuscular Derrick:    0    mins  40710;    Therapeutic Activity:     0     mins  35770;     Gait Trainin     mins  07829;     Ultrasound:     0     mins  79969;    Ionto                               0    mins   23649  Self Care                       0     mins   61163  Work Conditioning 1-2 hours    0    mins 19115  Work Conditioning ea add'l hour    0   mins 79439      Un-Timed:  Electrical Stimulation:    0     mins  89232 (MC );  Dry Needling     0     mins self-pay  Traction     0     mins 15594  Canalith Repos    0     mins 05577    Timed Treatment:   8   mins   Total Treatment:     8   mins    Asya Dong PT, DPT  [place] License: [id number]

## 2025-01-13 ENCOUNTER — TREATMENT (OUTPATIENT)
Dept: PHYSICAL THERAPY | Facility: CLINIC | Age: 40
End: 2025-01-13
Payer: COMMERCIAL

## 2025-01-13 DIAGNOSIS — G89.29 CHRONIC LEFT-SIDED LOW BACK PAIN WITH LEFT-SIDED SCIATICA: Primary | ICD-10-CM

## 2025-01-13 DIAGNOSIS — M54.42 CHRONIC LEFT-SIDED LOW BACK PAIN WITH LEFT-SIDED SCIATICA: Primary | ICD-10-CM

## 2025-01-13 PROCEDURE — 97140 MANUAL THERAPY 1/> REGIONS: CPT | Performed by: PHYSICAL THERAPIST

## 2025-01-13 PROCEDURE — 97110 THERAPEUTIC EXERCISES: CPT | Performed by: PHYSICAL THERAPIST

## 2025-01-13 PROCEDURE — 97112 NEUROMUSCULAR REEDUCATION: CPT | Performed by: PHYSICAL THERAPIST

## 2025-01-13 NOTE — PROGRESS NOTES
Physical Therapy Daily Treatment Note  [address] [facility]  [address]   [place] [address]  [phone]      Patient: Maren Ray   : 1985  Referring practitioner: Sariah Harrington PA-C  Date of Initial Visit: Type: THERAPY  Noted: 2024  Today's Date: 2025  Patient seen for 24 sessions       Visit Diagnoses:    ICD-10-CM ICD-9-CM   1. Chronic left-sided low back pain with left-sided sciatica  M54.42 724.2    G89.29 724.3     338.29       Subjective   Maren Ray reports: muscle spasms in back/hips much better since last visit, able to lay more comfortably at night. Still having nerve pn RLE that extends beyond the knee more laterally than in the past. Seems to onset w/ position changes.     Pre tx pn score: 4-R leg  Post tx pn score: 2    Objective   See Exercise, Manual, and Modality Logs for complete treatment.       Assessment & Plan       Assessment  Assessment details: No reported discomfort w/ supine nerve glides today. Notes improvement in back spasms w/ DN trial last visit. Continued w/ deep soft tissue release w/ good response. Continued to limit exercise progression, focusing more on engaging deep core stabilizers w/o excessively stressing lumbar spine.          Timed:         Manual Therapy:    10     mins  69773;     Therapeutic Exercise:    10     mins  82632;     Neuromuscular Derrick:    15    mins  59792;    Therapeutic Activity:     0     mins  52220;     Gait Trainin     mins  60522;     Ultrasound:     0     mins  64381;    Ionto                               0    mins   18699  Self Care                       0     mins   08291  Work Conditioning 1-2 hours    0    mins 36862  Work Conditioning ea add'l hour    0   mins 33443      Un-Timed:  Electrical Stimulation:    0     mins  97865 (MC );  Dry Needling     0     mins self-pay  Traction     0     mins 88226  Canalith Repos    0     mins 92069    Timed Treatment:   35   mins   Total Treatment:     35    mins    Asya Dong, PT  [place] License: [id number]

## 2025-01-17 ENCOUNTER — TREATMENT (OUTPATIENT)
Dept: PHYSICAL THERAPY | Facility: CLINIC | Age: 40
End: 2025-01-17
Payer: COMMERCIAL

## 2025-01-17 DIAGNOSIS — M54.42 CHRONIC LEFT-SIDED LOW BACK PAIN WITH LEFT-SIDED SCIATICA: Primary | ICD-10-CM

## 2025-01-17 DIAGNOSIS — G89.29 CHRONIC LEFT-SIDED LOW BACK PAIN WITH LEFT-SIDED SCIATICA: Primary | ICD-10-CM

## 2025-01-17 NOTE — PROGRESS NOTES
Physical Therapy Daily Treatment Note  [address] [facility]  [address]   [place] [address]  [phone]      Patient: Maren Ray   : 1985  Referring practitioner: Sariah Harrington PA-C  Date of Initial Visit: Type: THERAPY  Noted: 2024  Today's Date: 2025  Patient seen for 25 sessions       Visit Diagnoses:    ICD-10-CM ICD-9-CM   1. Chronic left-sided low back pain with left-sided sciatica  M54.42 724.2    G89.29 724.3     338.29       Subjective   Maren Ray reports: Seeing improvement in back symptoms w/ PT intervention. Still some R post hip tightness as times. Had mild spasm during exercise today but less intense than before.      Objective   See Exercise, Manual, and Modality Logs for complete treatment.       Assessment & Plan       Assessment  Assessment details: Cont to do well w/ MT intervention. Back pn less intense and muscle spasms less frequent. Cont to experience RLE paresthesia, but was less intense w/ transfers on table after intervention.    Plan  Plan details: Cont per POC          Timed:         Manual Therapy:    10     mins  36632;     Therapeutic Exercise:    0     mins  39224;     Neuromuscular Derrick:    0    mins  59238;    Therapeutic Activity:     0     mins  95459;     Gait Trainin     mins  49798;     Ultrasound:     0     mins  90924;    Ionto                               0    mins   24977  Self Care                       0     mins   48339  Work Conditioning 1-2 hours    0    mins 70381  Work Conditioning ea add'l hour    0   mins 86078      Un-Timed:  Electrical Stimulation:    0     mins  26683 ( );  Dry Needling     0     mins self-pay  Traction     0     mins 81893  Canalith Repos    0     mins 74758    Timed Treatment:   10   mins   Total Treatment:     10   mins    Asya Dong PT, DPT, CIDN  [place] License: [id number]

## 2025-01-17 NOTE — PROGRESS NOTES
Physical Therapy Daily Treatment Note  [facility]  [address]  [address]   [place] [address]  [phone]       Patient: Maren Ray   : 1985  Diagnosis/ICD-10 Code:  Chronic left-sided low back pain with left-sided sciatica [M54.42, G89.29]  Referring practitioner: Sariah Harrington PA-C  Date of Initial Visit: Type: THERAPY  Noted: 2024  Today's Date: 2025  Patient seen for 25 sessions         Visit Diagnoses:    ICD-10-CM ICD-9-CM   1. Chronic left-sided low back pain with left-sided sciatica  M54.42 724.2    G89.29 724.3     338.29       Subjective   Maren Ray reports: back pain has been about the same. She's been feeling more muscle spasms than nerve pain.       Objective     See Exercise, Manual, and Modality Logs for complete treatment.       Assessment/Plan  Patient tolerated repeated flexion of lumbar spine best in today session compared to repeated extension.  She was progressed with deep core stabilization exercises with good tolerance and no aggravation of back/hip pain.  She had some increase of muscle spasms in the right hip with lower trunk rotation that improved with repeated flexion.  She was instructed to complete repeated flexion as needed at home as long as it does not increase pain or nerve symptoms, she was agreeable.  Will continue to progress per her tolerance.    Dry needling was performed by Asya Dong PT see her note in this visit for specific intervention.    Progress per Plan of Care and Progress strengthening /stabilization /functional activity           Timed:         Manual Therapy:         mins  09931;     Therapeutic Exercise:    15     mins  14278;     Neuromuscular Derrick:    15    mins  25788;    Therapeutic Activity:     15     mins  30600;     Gait Training:           mins  93949;     Ultrasound:          mins  01487;    Ionto                                   mins   10192  Self Care                            mins    54381  [facility]         mins 77473      Un-Timed:  Electrical Stimulation:         mins  08111 ( );  Dry Needling          mins self-pay  Traction          mins 17310      Timed Treatment:   45   mins   Total Treatment:     45   mins      Libia Lopez PT  Physical Therapist

## 2025-01-21 ENCOUNTER — TREATMENT (OUTPATIENT)
Dept: PHYSICAL THERAPY | Facility: CLINIC | Age: 40
End: 2025-01-21
Payer: COMMERCIAL

## 2025-01-21 DIAGNOSIS — G89.29 CHRONIC LEFT-SIDED LOW BACK PAIN WITH LEFT-SIDED SCIATICA: Primary | ICD-10-CM

## 2025-01-21 DIAGNOSIS — M54.42 CHRONIC LEFT-SIDED LOW BACK PAIN WITH LEFT-SIDED SCIATICA: Primary | ICD-10-CM

## 2025-01-21 PROCEDURE — 97112 NEUROMUSCULAR REEDUCATION: CPT | Performed by: PHYSICAL THERAPIST

## 2025-01-21 PROCEDURE — 97530 THERAPEUTIC ACTIVITIES: CPT | Performed by: PHYSICAL THERAPIST

## 2025-01-21 PROCEDURE — 97110 THERAPEUTIC EXERCISES: CPT | Performed by: PHYSICAL THERAPIST

## 2025-01-21 NOTE — PROGRESS NOTES
Physical Therapy Daily Treatment Note  [address] [facility] [address]   [place] [address]  [phone]      Patient: Maren Ray   : 1985  Referring practitioner: Sariah Harrington PA-C  Date of Initial Visit: Type: THERAPY  Noted: 2024  Today's Date: 2025  Patient seen for 26 sessions       Visit Diagnoses:    ICD-10-CM ICD-9-CM   1. Chronic left-sided low back pain with left-sided sciatica  M54.42 724.2    G89.29 724.3     338.29       Subjective   Maren Ray reports: No low back spasms since last visit. However, feels that nerve pn has ramped up more over the past couple of weeks. Cont to report N/T throughout R leg w/ position changes and during the night that makes sleeping difficult.     Pre tx pn score: 2-R glut  Post tx pn score: 2    Objective   See Exercise, Manual, and Modality Logs for complete treatment.       Assessment & Plan       Assessment  Assessment details: Pt reports marked improvement in previously reported muscles spasms, back pn but denies change in R leg symptoms. Cont to report intermittent N/T extending along L5 distribution RLE. Able to get relief manual lumbar traction but returns immediately w/ return to upright position. Also aggravated w/ transfers, position changes. No noted relief w/ repeated extn in prone/standing or standing sideglides. Given apparent worsening of radicular symptoms and limited exercise tolerance, discussed holding PT until neuro f/u in a couple of weeks. Pt agreeable.    Plan  Plan details: Await recommendations from neuro          Timed:         Manual Therapy:    0     mins  04138  Therapeutic Exercise:    12     mins  60392;     Neuromuscular Derrick:    10    mins  63933;    Therapeutic Activity:     25     mins  71320;     Gait Trainin     mins  53192;     Ultrasound:     0     mins  70197;    Ionto                               0    mins   49163  Self Care                       0     mins   93912  Work Conditioning  1-2 hours    0    mins 24874  Work Conditioning ea add'l hour    0   mins 99950      Un-Timed:  Electrical Stimulation:    0     mins  51626 ( );  Dry Needling     0     mins self-pay  Traction     0     mins 94713  Canalith Repos    0     mins 89035    Timed Treatment:   47   mins   Total Treatment:     47   mins    Asya Dong, PT  [place] License: [id number]

## 2025-02-03 ENCOUNTER — OFFICE VISIT (OUTPATIENT)
Dept: FAMILY MEDICINE CLINIC | Facility: CLINIC | Age: 40
End: 2025-02-03
Payer: COMMERCIAL

## 2025-02-03 VITALS
DIASTOLIC BLOOD PRESSURE: 76 MMHG | SYSTOLIC BLOOD PRESSURE: 136 MMHG | RESPIRATION RATE: 18 BRPM | BODY MASS INDEX: 47.09 KG/M2 | OXYGEN SATURATION: 100 % | WEIGHT: 293 LBS | HEART RATE: 78 BPM | TEMPERATURE: 97.7 F | HEIGHT: 66 IN

## 2025-02-03 DIAGNOSIS — E66.01 MORBID OBESITY: ICD-10-CM

## 2025-02-03 DIAGNOSIS — F32.A DEPRESSION, UNSPECIFIED DEPRESSION TYPE: ICD-10-CM

## 2025-02-03 DIAGNOSIS — R73.9 HYPERGLYCEMIA: ICD-10-CM

## 2025-02-03 DIAGNOSIS — F41.9 ANXIETY: ICD-10-CM

## 2025-02-03 DIAGNOSIS — M54.16 LUMBAR BACK PAIN WITH RADICULOPATHY AFFECTING LEFT LOWER EXTREMITY: Primary | ICD-10-CM

## 2025-02-03 DIAGNOSIS — Z13.220 SCREENING FOR HYPERLIPIDEMIA: ICD-10-CM

## 2025-02-03 PROBLEM — M54.30 SCIATIC LEG PAIN: Status: RESOLVED | Noted: 2024-08-07 | Resolved: 2025-02-03

## 2025-02-03 PROCEDURE — 99214 OFFICE O/P EST MOD 30 MIN: CPT | Performed by: FAMILY MEDICINE

## 2025-02-03 RX ORDER — IBUPROFEN 200 MG
200 TABLET ORAL EVERY 6 HOURS PRN
COMMUNITY

## 2025-02-03 NOTE — PROGRESS NOTES
Chief Complaint  Establish Care (Transferring from [facility])    Subjective          Maren Ray presents to [facility]    History of Present Illness  The patient presents to establish care.    She has a history of anxiety and depression, managed with sertraline. She reports that the medication is generally effective in controlling her mood, although she occasionally feels the need for an increased dose. She continues to engage in cognitive therapy.    She has lumbar radiculopathy, treated with gabapentin prescribed by Dr. Doherty and Dr. Morales at [facility]. Despite experiencing significant pain relief from gabapentin, she continues to suffer from severe pain. She has a follow-up appointment scheduled with Dr. Doherty's PA next week. She has undergone an unsuccessful injection procedure, which resulted in complications during and after the procedure. She has a high pain tolerance due to a lifetime of chronic pain. She has also tried chiropractic treatment, which initially provided relief but eventually led to a plateau in progress. Physical therapy has been beneficial, particularly in teaching her how to stand up from a chair and perform core exercises. She experienced a setback in her condition following a trip to [facility] for [employer], which involved sleeping on uncomfortable beds and a 3.5-hour car ride. She is considering resuming aqua therapy and plans to discuss this with her neurosurgeon next week.    She has not had an annual exam in several years and has not had a Pap smear in the past 2 years. She is under the care of a gynecologist at [facility]. She has noticed fluctuations in her blood sugar levels since the birth of her last child. Her fasting blood sugar levels are occasionally high, ranging between 110 and 130, but postprandial readings are typically within the normal range, less than 140. Despite reducing her  intake of sugar and carbohydrates, her morning blood sugar levels remain elevated. She has a history of gestational diabetes during two of her pregnancies, managed with medication during one pregnancy and injections during the other. She has not observed any significant weight loss despite making substantial dietary changes. Her back pain limits her mobility, but she attempts to walk for at least 10 minutes daily. She is open to trying weight loss medications to alleviate her back issues.    FAMILY HISTORY  The patient comes from a family with a history of diabetes.    ALLERGIES  The patient has had bad reactions with CYMBALTA, including violent dreams and hallucinations.        The following portions of the patient's history were reviewed and updated as appropriate: allergies, current medications, past family history, past medical history, past social history, past surgical history, and problem list.    Objective      Physical Exam  Vitals reviewed.   Cardiovascular:      Rate and Rhythm: Normal rate.      Heart sounds: Normal heart sounds.   Pulmonary:      Effort: Pulmonary effort is normal.      Breath sounds: Normal breath sounds.   Neurological:      Mental Status: She is alert.        Physical Exam      Result Review :       Results               Assessment and Plan    Diagnoses and all orders for this visit:    1. Lumbar back pain with radiculopathy affecting left lower extremity (Primary)  -     CBC (No Diff); Future  -     Comprehensive Metabolic Panel; Future  -     Hemoglobin A1c; Future  -     Lipid Panel With / Chol / HDL Ratio; Future  -     TSH Rfx On Abnormal To Free T4; Future  -     Vitamin D,25-Hydroxy; Future    2. Hyperglycemia  -     CBC (No Diff); Future  -     Comprehensive Metabolic Panel; Future  -     Hemoglobin A1c; Future  -     Lipid Panel With / Chol / HDL Ratio; Future  -     TSH Rfx On Abnormal To Free T4; Future  -     Vitamin D,25-Hydroxy; Future    3. Anxiety  -     sertraline  (ZOLOFT) 50 MG tablet; Take 1 tablet by mouth Daily.  Dispense: 90 tablet; Refill: 1  -     CBC (No Diff); Future  -     Comprehensive Metabolic Panel; Future  -     Hemoglobin A1c; Future  -     Lipid Panel With / Chol / HDL Ratio; Future  -     TSH Rfx On Abnormal To Free T4; Future  -     Vitamin D,25-Hydroxy; Future    4. Depression, unspecified depression type  -     sertraline (ZOLOFT) 50 MG tablet; Take 1 tablet by mouth Daily.  Dispense: 90 tablet; Refill: 1  -     CBC (No Diff); Future  -     Comprehensive Metabolic Panel; Future  -     Hemoglobin A1c; Future  -     Lipid Panel With / Chol / HDL Ratio; Future  -     TSH Rfx On Abnormal To Free T4; Future  -     Vitamin D,25-Hydroxy; Future    5. Morbid obesity  -     CBC (No Diff); Future  -     Comprehensive Metabolic Panel; Future  -     Hemoglobin A1c; Future  -     Lipid Panel With / Chol / HDL Ratio; Future  -     TSH Rfx On Abnormal To Free T4; Future  -     Vitamin D,25-Hydroxy; Future    6. Screening for hyperlipidemia  -     Lipid Panel With / Chol / HDL Ratio; Future    7. BMI 50.0-59.9, adult  -     CBC (No Diff); Future  -     Comprehensive Metabolic Panel; Future  -     Hemoglobin A1c; Future  -     Lipid Panel With / Chol / HDL Ratio; Future  -     TSH Rfx On Abnormal To Free T4; Future  -     Vitamin D,25-Hydroxy; Future      Assessment & Plan  1. Anxiety and depression.  She is currently on sertraline, which she reports is generally effective. A prescription refill for sertraline has been sent to her pharmacy ([facility]). She is advised to continue her cognitive therapy practices. If her symptoms worsen, an increase in the dosage may be considered.    2. Lumbar radiculopathy.  She is on gabapentin, which provides some relief. She will follow up with her neurosurgeon to discuss further management options.    3. Elevated fasting blood glucose.  Her fasting blood glucose levels have been intermittently high, ranging between 110 and 130. A  comprehensive panel of laboratory tests, including A1c, cholesterol, thyroid screening, and basic panel, has been ordered. She is advised to fast for 8 hours before the tests.    4. Health maintenance.  Her mammogram is up to date as of November. She has not had a Pap smear in the past 2 years. She is advised to schedule a Pap smear.    Follow-up  The patient is scheduled for a follow-up visit in 6 months for an annual examination.        Follow Up   Return in about 6 months (around 8/3/2025) for Annual physical.    Patient or patient representative verbalized consent for the use of Ambient Listening during the visit with  Jessica Subramanian DO for chart documentation. 2/5/2025  07:24 EST  Patient was given instructions and counseling regarding her condition or for health maintenance advice. Please see specific information pulled into the AVS if appropriate.

## 2025-02-04 ENCOUNTER — LAB (OUTPATIENT)
Dept: FAMILY MEDICINE CLINIC | Facility: CLINIC | Age: 40
End: 2025-02-04
Payer: COMMERCIAL

## 2025-02-04 DIAGNOSIS — Z13.220 SCREENING FOR HYPERLIPIDEMIA: ICD-10-CM

## 2025-02-04 DIAGNOSIS — F32.A DEPRESSION, UNSPECIFIED DEPRESSION TYPE: ICD-10-CM

## 2025-02-04 DIAGNOSIS — E66.01 MORBID OBESITY: ICD-10-CM

## 2025-02-04 DIAGNOSIS — F41.9 ANXIETY: ICD-10-CM

## 2025-02-04 DIAGNOSIS — R73.9 HYPERGLYCEMIA: ICD-10-CM

## 2025-02-04 DIAGNOSIS — M54.16 LUMBAR BACK PAIN WITH RADICULOPATHY AFFECTING LEFT LOWER EXTREMITY: ICD-10-CM

## 2025-02-05 LAB
25(OH)D3+25(OH)D2 SERPL-MCNC: 11.6 NG/ML (ref 30–100)
ALBUMIN SERPL-MCNC: 3.8 G/DL (ref 3.5–5.2)
ALBUMIN/GLOB SERPL: 1.4 G/DL
ALP SERPL-CCNC: 61 U/L (ref 39–117)
ALT SERPL-CCNC: 35 U/L (ref 1–33)
AST SERPL-CCNC: 23 U/L (ref 1–32)
BILIRUB SERPL-MCNC: <0.2 MG/DL (ref 0–1.2)
BUN SERPL-MCNC: 13 MG/DL (ref 6–20)
BUN/CREAT SERPL: 17.8 (ref 7–25)
CALCIUM SERPL-MCNC: 9.3 MG/DL (ref 8.6–10.5)
CHLORIDE SERPL-SCNC: 104 MMOL/L (ref 98–107)
CHOLEST SERPL-MCNC: 211 MG/DL (ref 0–200)
CHOLEST/HDLC SERPL: 6.03 {RATIO}
CO2 SERPL-SCNC: 25.4 MMOL/L (ref 22–29)
CREAT SERPL-MCNC: 0.73 MG/DL (ref 0.57–1)
EGFRCR SERPLBLD CKD-EPI 2021: 106.8 ML/MIN/1.73
ERYTHROCYTE [DISTWIDTH] IN BLOOD BY AUTOMATED COUNT: 13.2 % (ref 12.3–15.4)
GLOBULIN SER CALC-MCNC: 2.7 GM/DL
GLUCOSE SERPL-MCNC: 135 MG/DL (ref 65–99)
HBA1C MFR BLD: 5.6 % (ref 4.8–5.6)
HCT VFR BLD AUTO: 40 % (ref 34–46.6)
HDLC SERPL-MCNC: 35 MG/DL (ref 40–60)
HGB BLD-MCNC: 13.2 G/DL (ref 12–15.9)
LDLC SERPL CALC-MCNC: 152 MG/DL (ref 0–100)
MCH RBC QN AUTO: 29.8 PG (ref 26.6–33)
MCHC RBC AUTO-ENTMCNC: 33 G/DL (ref 31.5–35.7)
MCV RBC AUTO: 90.3 FL (ref 79–97)
PLATELET # BLD AUTO: 285 10*3/MM3 (ref 140–450)
POTASSIUM SERPL-SCNC: 4.6 MMOL/L (ref 3.5–5.2)
PROT SERPL-MCNC: 6.5 G/DL (ref 6–8.5)
RBC # BLD AUTO: 4.43 10*6/MM3 (ref 3.77–5.28)
SODIUM SERPL-SCNC: 139 MMOL/L (ref 136–145)
TRIGL SERPL-MCNC: 129 MG/DL (ref 0–150)
TSH SERPL DL<=0.005 MIU/L-ACNC: 2.61 UIU/ML (ref 0.27–4.2)
VLDLC SERPL CALC-MCNC: 24 MG/DL (ref 5–40)
WBC # BLD AUTO: 7.29 10*3/MM3 (ref 3.4–10.8)

## 2025-02-09 RX ORDER — ERGOCALCIFEROL 1.25 MG/1
50000 CAPSULE, LIQUID FILLED ORAL WEEKLY
Qty: 13 CAPSULE | Refills: 0 | Status: SHIPPED | OUTPATIENT
Start: 2025-02-09

## 2025-02-10 RX ORDER — METHOCARBAMOL 750 MG/1
750 TABLET, FILM COATED ORAL 3 TIMES DAILY PRN
Qty: 90 TABLET | Refills: 0 | Status: SHIPPED | OUTPATIENT
Start: 2025-02-10

## 2025-02-10 NOTE — TELEPHONE ENCOUNTER
Provider: Dr. Doherty   Surgery/Procedure:  n/a  Surgery/Procedure Date:  n/a  Last visit:  11/20/2024  Next visit: 02/11/2025     Reason for call:  Refill request for Methocarbamol 750mg. Please advise.

## 2025-02-11 ENCOUNTER — OFFICE VISIT (OUTPATIENT)
Dept: NEUROSURGERY | Facility: CLINIC | Age: 40
End: 2025-02-11
Payer: COMMERCIAL

## 2025-02-11 VITALS
HEIGHT: 66 IN | TEMPERATURE: 97.7 F | SYSTOLIC BLOOD PRESSURE: 124 MMHG | BODY MASS INDEX: 47.09 KG/M2 | WEIGHT: 293 LBS | DIASTOLIC BLOOD PRESSURE: 64 MMHG

## 2025-02-11 DIAGNOSIS — M51.362 DEGENERATION OF INTERVERTEBRAL DISC OF LUMBAR REGION WITH DISCOGENIC BACK PAIN AND LOWER EXTREMITY PAIN: Primary | ICD-10-CM

## 2025-02-11 PROCEDURE — 99213 OFFICE O/P EST LOW 20 MIN: CPT

## 2025-02-11 NOTE — PROGRESS NOTES
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  She developed left hip and lower extremity pain that extended down into the back of her left calf and heel.  She had a rather painful epidural steroid injection with Dr. Morales on 2024.  She has been working with physical therapy and chiropractic who have greatly made difference in her back pain and functionality.  Unfortunately, around the Thanksgiving holiday, a long car trip and uncomfortable bed increased symptoms in her right lower extremity. Pain extends down her anterolateral thigh, into her lateral calf. She will get associated numbness in the 2nd, 3rd, and 4th toes. Her pain has much improved since November, but she is still uncomfortable most days. She has continued with physical therapy. She denies new weakness, saddle anesthesia, or symptoms in her left leg.       Review of Systems   Constitutional:  Positive for activity change. Negative for appetite change, chills, diaphoresis, fatigue, fever and unexpected weight change.   HENT:  Negative for congestion, dental problem, drooling, ear discharge, ear pain, facial swelling, hearing loss, mouth sores, nosebleeds, postnasal drip, rhinorrhea, sinus pressure, sinus pain, sneezing, sore throat, tinnitus, trouble swallowing and voice change.    Eyes:  Negative for photophobia, pain, discharge, redness, itching and visual disturbance.   Respiratory:  Negative for apnea, cough, choking, chest tightness, shortness of breath, wheezing and stridor.    Cardiovascular:  Negative for chest pain, palpitations and leg swelling.   Gastrointestinal:  Negative for abdominal distention, abdominal pain, anal bleeding, blood in stool, constipation, diarrhea, nausea, rectal  "pain and vomiting.   Endocrine: Negative for cold intolerance, heat intolerance, polydipsia, polyphagia and polyuria.   Genitourinary:  Negative for decreased urine volume, difficulty urinating, dyspareunia, dysuria, enuresis, flank pain, frequency, genital sores, hematuria, menstrual problem, pelvic pain, urgency, vaginal bleeding, vaginal discharge and vaginal pain.   Musculoskeletal:  Positive for back pain. Negative for arthralgias, gait problem, joint swelling, myalgias, neck pain and neck stiffness.   Skin:  Negative for color change, pallor, rash and wound.   Allergic/Immunologic: Negative for environmental allergies, food allergies and immunocompromised state.   Neurological:  Positive for numbness. Negative for dizziness, tremors, seizures, syncope, facial asymmetry, speech difficulty, weakness, light-headedness and headaches.   Hematological:  Negative for adenopathy. Does not bruise/bleed easily.   Psychiatric/Behavioral:  Positive for sleep disturbance. Negative for agitation, behavioral problems, confusion, decreased concentration, dysphoric mood, hallucinations, self-injury and suicidal ideas. The patient is not nervous/anxious and is not hyperactive.         The patient's past medical history, past surgical history, family history, and social history have been reviewed at length in the electronic medical record     Physical examination:  Blood pressure 124/64, temperature 97.7 °F (36.5 °C), temperature source Infrared, height 167.6 cm (66\"), weight (!) 163 kg (359 lb 9.6 oz), not currently breastfeeding.  Constitutional: The patient is well-developed.  She is obese.  She is pleasant and is in no acute distress.     HEENT:  normocephalic, atraumatic, sclera clear     MSK: Straight leg testing is mildly positive on the right.   Mike signs are negative bilaterally.  Tenderness to palpation in the midline lumbar spine.     Neurological Exam   A/A/C, speech clear, attention normal, conversant, answers " questions appropriately, good historian.  Motor examination does not reveal weakness in the , upper or lower extremities.   Sensation is intact.  Gait is normal, balance is normal.   No tremors are noted.  Reflexes are intact, 1+  Rios is negative. Clonus is negative.     Medical Decision Making  Diagnoses and all orders for this visit:    1. Degeneration of intervertebral disc of lumbar region with discogenic back pain and lower extremity pain (Primary)  -     MRI Lumbar Spine Without Contrast; Future    Other orders  -     tiZANidine (ZANAFLEX) 4 MG tablet; Take 1 tablet by mouth Every 8 (Eight) Hours As Needed for Muscle Spasms.  Dispense: 30 tablet; Refill: 1    I am ordering a new MRI of the lumbar spine to assess for any changes given the new symptoms that Ms. Ray is experiencing.  Per her request, I am sending a different muscle relaxant and lieu of Robaxin.  She is understanding that she is not to take Robaxin while she is taking tizanidine.  She is going to work on some exercises in the pool and may continue activities as tolerated.  She will follow-up with me in 3-4 weeks with her MRI studies and recommendations will be made at that time.  She is open to the possibility for consideration of steroid injections, but she is leery of this after her previous experience.    Any copied data from previous notes included in the (1) HPI, (2) PE, (3) MDM and/or assessment and plan has been reviewed and is accurate as of this day.    Noelle English PA-C     Patient Care Team:  Jessica Subramanian DO as PCP - General (Family Medicine)  Gentry Doherty MD as Consulting Physician (Neurosurgery)

## 2025-02-12 ENCOUNTER — PATIENT ROUNDING (BHMG ONLY) (OUTPATIENT)
Dept: FAMILY MEDICINE CLINIC | Facility: CLINIC | Age: 40
End: 2025-02-12
Payer: COMMERCIAL

## 2025-03-18 ENCOUNTER — OFFICE VISIT (OUTPATIENT)
Dept: NEUROSURGERY | Facility: CLINIC | Age: 40
End: 2025-03-18
Payer: COMMERCIAL

## 2025-03-18 VITALS — TEMPERATURE: 97.5 F | BODY MASS INDEX: 47.09 KG/M2 | WEIGHT: 293 LBS | HEIGHT: 66 IN

## 2025-03-18 DIAGNOSIS — M54.16 LUMBAR RADICULOPATHY: ICD-10-CM

## 2025-03-18 DIAGNOSIS — M51.362 DEGENERATION OF INTERVERTEBRAL DISC OF LUMBAR REGION WITH DISCOGENIC BACK PAIN AND LOWER EXTREMITY PAIN: Primary | ICD-10-CM

## 2025-03-18 DIAGNOSIS — M48.062 SPINAL STENOSIS, LUMBAR REGION, WITH NEUROGENIC CLAUDICATION: ICD-10-CM

## 2025-03-18 PROCEDURE — 99213 OFFICE O/P EST LOW 20 MIN: CPT

## 2025-03-18 RX ORDER — METHYLDOPA/HYDROCHLOROTHIAZIDE 250MG-15MG
TABLET ORAL
COMMUNITY
Start: 2025-02-27

## 2025-03-18 NOTE — PROGRESS NOTES
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  She developed left hip and lower extremity pain that extended down into the back of her left calf and heel.  She had a rather painful epidural steroid injection with Dr. Morales on 2024.  She has been working with physical therapy and chiropractic who have greatly made difference in her back pain and functionality.  Unfortunately, around the Thanksgiving holiday, a long car trip and uncomfortable bed increased symptoms in her right lower extremity. Pain extended down her anterolateral thigh, into her lateral calf. Most of her pain still resides there. Her pain has much improved since November, but she is still uncomfortable most days. She has continued with physical therapy. A recent change of her muscle relaxer was very helpful. She denies new weakness, saddle anesthesia, or symptoms in her left leg. She is getting by.      Review of Systems   Constitutional:  Negative for activity change, appetite change, chills, diaphoresis, fatigue, fever and unexpected weight change.   HENT:  Negative for congestion, dental problem, drooling, ear discharge, ear pain, facial swelling, hearing loss, mouth sores, nosebleeds, postnasal drip, rhinorrhea, sinus pressure, sinus pain, sneezing, sore throat, tinnitus, trouble swallowing and voice change.    Eyes:  Negative for photophobia, pain, discharge, redness, itching and visual disturbance.   Respiratory:  Negative for apnea, cough, choking, chest tightness, shortness of breath, wheezing and stridor.    Cardiovascular:  Negative for chest pain, palpitations and leg swelling.   Gastrointestinal:  Negative for abdominal distention, abdominal pain, anal bleeding, blood in stool,  "constipation, diarrhea, nausea, rectal pain and vomiting.   Endocrine: Negative for cold intolerance, heat intolerance, polydipsia, polyphagia and polyuria.   Genitourinary:  Negative for decreased urine volume, difficulty urinating, dyspareunia, dysuria, enuresis, flank pain, frequency, genital sores, hematuria, menstrual problem, pelvic pain, urgency, vaginal bleeding, vaginal discharge and vaginal pain.   Musculoskeletal:  Positive for back pain. Negative for arthralgias, gait problem, joint swelling, myalgias, neck pain and neck stiffness.   Skin:  Negative for color change, pallor, rash and wound.   Allergic/Immunologic: Negative for environmental allergies, food allergies and immunocompromised state.   Neurological:  Positive for numbness. Negative for dizziness, tremors, seizures, syncope, facial asymmetry, speech difficulty, weakness, light-headedness and headaches.   Hematological:  Negative for adenopathy. Does not bruise/bleed easily.   Psychiatric/Behavioral:  Negative for agitation, behavioral problems, confusion, decreased concentration, dysphoric mood, hallucinations, self-injury, sleep disturbance and suicidal ideas. The patient is not nervous/anxious and is not hyperactive.         The patient's past medical history, past surgical history, family history, and social history have been reviewed at length in the electronic medical record.     Physical examination:  Temperature 97.5 °F (36.4 °C), temperature source Infrared, height 167.6 cm (66\"), weight (!) 163 kg (358 lb 14.4 oz), not currently breastfeeding.  Constitutional: The patient is well-developed.  She is obese.  She is pleasant and is in no acute distress.     HEENT:  normocephalic, atraumatic, sclera clear     MSK: Straight leg testing is mildly positive on the right.   Mike signs are negative bilaterally.  Tenderness to palpation in the midline lumbar spine.     Neurological Exam   A/A/C, speech clear, attention normal, conversant, " answers questions appropriately, good historian.  Motor examination does not reveal weakness in the , upper or lower extremities.   Sensation is intact.  Gait is normal, balance is normal.   No tremors are noted.  Reflexes are intact, 1+  Rios is negative. Clonus is negative.        Radiographic Imaging:  For my review MRI of the lumbar spine demonstrates diffuse spondylosis with mild circumferential disc bulges at all lumbar levels.  There is also short pedicles and a mild increase deposition of fat in the posterior aspect of the spinal canal.  All of these can coalesce into narrowing of the lumbar thecal sac.  At L4-5, there is a worsened central disc protrusion and facet hypertrophy that result in severe canal stenosis and moderate-severe neuroforaminal narrowing bilaterally.  There was a central disc herniation at L5-S1 and on previous MRI studies (August 2024) that have improved.    Medical Decision Making  Diagnoses and all orders for this visit:    1. Degeneration of intervertebral disc of lumbar region with discogenic back pain and lower extremity pain (Primary)    2. Lumbar radiculopathy    3. Spinal stenosis, lumbar region, with neurogenic claudication    I had a candice discussion with Ms. Ray that she does have worsening narrowing at L4-5, but her body habitus and rather diffuse symptoms do not make her an ideal surgical candidate.  At this time, she is doing well with home exercises and muscle relaxants.  She would like to continue trying conservative measures before consideration of more injections or surgery.  I have advised her to be aware of new or worsening symptoms she should make her office aware of, including changes to bowel or bladder function new numbness or tingling, especially in the saddle distribution.  Any new weakness in the lower extremity also would necessitate reevaluation.  She is presently going to follow-up as needed.  I am happy to see her back or answer any further  questions she may have.    Any copied data from previous notes included in the (1) HPI, (2) PE, (3) MDM and/or assessment and plan has been reviewed and is accurate as of this day.    Noelle English PA-C     Patient Care Team:  Jessica Subramanian DO as PCP - General (Family Medicine)  Gentry Doherty MD as Consulting Physician (Neurosurgery)

## 2025-04-28 RX ORDER — ERGOCALCIFEROL 1.25 MG/1
50000 CAPSULE, LIQUID FILLED ORAL WEEKLY
Qty: 12 CAPSULE | OUTPATIENT
Start: 2025-04-28

## 2025-05-01 ENCOUNTER — OFFICE VISIT (OUTPATIENT)
Dept: PAIN MEDICINE | Facility: CLINIC | Age: 40
End: 2025-05-01
Payer: COMMERCIAL

## 2025-05-01 VITALS — WEIGHT: 293 LBS | HEIGHT: 66 IN | BODY MASS INDEX: 47.09 KG/M2

## 2025-05-01 DIAGNOSIS — M54.16 LUMBAR RADICULOPATHY: Primary | ICD-10-CM

## 2025-05-01 DIAGNOSIS — M54.16 LUMBAR RADICULOPATHY: ICD-10-CM

## 2025-05-01 PROCEDURE — 99214 OFFICE O/P EST MOD 30 MIN: CPT

## 2025-05-01 RX ORDER — GABAPENTIN 600 MG/1
600 TABLET ORAL 3 TIMES DAILY
Qty: 90 TABLET | Refills: 4 | Status: SHIPPED | OUTPATIENT
Start: 2025-05-01

## 2025-05-01 NOTE — TELEPHONE ENCOUNTER
Refill gabapentin  Gabapentin 600 mg 3 times daily, 90 pills, #4 refills  [place] reviewed and compliant  Controlled substance agreement signed in office  Compliant UDS  Appropriate follow-up visit scheduled

## 2025-05-01 NOTE — PROGRESS NOTES
Referring Physician: No referring provider defined for this encounter.    Primary Physician: Jessica Subramanian DO    CHIEF COMPLAINT or REASON FOR VISIT: Follow-up (Back pain)      Initial history of present illness on 09/16/2024:  Ms. Maren Ray is 39 y.o. female who presents as a new patient referral for evaluation and treatment of a 2-month history of low back pain with radiation to left lower extremity.  Ms. Ray was in her normal state of health without any history of spinal surgery when, in July, without any inciting event or trauma she developed new onset left-sided low back pain with radiation to left lower extremity and plantar surface of foot.  She does have a past history of fibromyalgia which manifests as diffuse muscle pain primarily in the shoulders and neck.  She has been engaged in physical therapy for this issue and has additionally seen Dr. Doherty, neurosurgery, who started her on gabapentin 3 times a day.  Patient notes benefit with gabapentin and physical therapy.  She continues to complain of primarily a numbness type pain radiating down the left side of her leg to the bottom of her foot.  Patient denies any bowel or bladder dysfunction, lower extremity weakness, new onset saddle anesthesia or unexplained weight loss.       Interval history:  Patient returns to clinic today for medication management.  She continues to do well with medication management.  She has some low back pain with radiation to the right lower extremity however this is significantly improved compared to before.  She has been taking gabapentin 600 mg 3 times daily with good pain relief.  She will take muscle relaxers which help as well.  Overall, she is doing well.  Patient denies any bowel or bladder dysfunction, lower extremity weakness, new onset saddle anesthesia or unexplained weight loss.   She did undergo a updated lumbar MRI on 3/8/2025 which revealed per radiology report: Moderate to severe canal  stenosis at L4/5 with bilateral effacement of the L5 nerve roots.    Interventions:  9/26/2024: L5/S1 LESI 80% relief of left lower extremity pain.    Objective Pain Scoring:   BRIEF PAIN INVENTORY:  Total score:   Pain Score    05/01/25 0854   PainSc: 4    PainLoc: Back        PHQ-2:    PHQ-9:    Opioid Risk Tool:         Review of Systems:   ROS negative except as otherwise noted     Past Medical History:   Past Medical History:   Diagnosis Date    Acid reflux     Allergic     Anxiety     Arthritis     Breast cancer screening by mammogram 08/12/2024    Bronchitis     Chronic pain disorder     CTS (carpal tunnel syndrome)     Depression     Diabetes mellitus     Extremity pain     Left leg & hip    Fibromyalgia     Fibromyalgia, primary     Gestational diabetes February(?) 2017    Headache, tension-type     History of ear infections     History of medical problems     Spinal Stenosis, Degenerative Disc Disease    Hypotension     Hypotension     Joint pain     Low back pain     Lumbosacral disc disease     Migraines     Neck pain     Obesity     Peripheral neuropathy     Shingles     Sinusitis     Sleep apnea     Spinal stenosis 08/2024    TMJ dysfunction     Urinary tract infection     Vaginal delivery 04/21/2017         Past Surgical History:   Past Surgical History:   Procedure Laterality Date    MOUTH SURGERY      WISDOM TOOTH EXTRACTION           Family History   Family History   Problem Relation Age of Onset    Chronic bronchitis Mother     Stroke Mother     Hypotension Mother     Restless legs syndrome Mother     Arthritis Mother     Sleep apnea Father     Obesity Father     Cancer Father     Diabetes Father     Hypertension Father     Arthritis Father     Obesity Brother     Diabetes Brother     Bipolar disorder Brother     Anxiety disorder Brother     Asthma Brother     Depression Brother     GI problems Brother         Gastroparesis    Early death Maternal Grandmother         Tuberculosis in late 30s     "Heart disease Paternal Grandmother     Hypertension Paternal Grandmother     Kidney disease Paternal Grandmother     Cancer Paternal Grandmother     Ovarian cancer Paternal Aunt 70    Diabetes Paternal Grandfather     Cancer Paternal Grandfather     Cancer Maternal Aunt     Obesity Brother     Breast cancer Neg Hx          Social History   Social History     Socioeconomic History    Marital status:      Spouse name: Rudy   Tobacco Use    Smoking status: Never     Passive exposure: Never    Smokeless tobacco: Never   Vaping Use    Vaping status: Never Used   Substance and Sexual Activity    Alcohol use: Never    Drug use: Never    Sexual activity: Yes     Partners: Male     Birth control/protection: None        Medications:     Current Outpatient Medications:     gabapentin (NEURONTIN) 600 MG tablet, Take 1 tablet by mouth 3 (Three) Times a Day., Disp: 90 tablet, Rfl: 3    ibuprofen (ADVIL,MOTRIN) 200 MG tablet, Take 1 tablet by mouth Every 6 (Six) Hours As Needed for Mild Pain., Disp: , Rfl:     levocetirizine (Xyzal Allergy 24HR) 5 MG tablet, Take 1 tablet by mouth Every Evening., Disp: , Rfl:     Menatetrenone (Vitamin K2) 100 MCG tablet, , Disp: , Rfl:     sertraline (ZOLOFT) 50 MG tablet, Take 1 tablet by mouth Daily., Disp: 90 tablet, Rfl: 1    tiZANidine (ZANAFLEX) 4 MG tablet, TAKE 1 TABLET BY MOUTH EVERY 8 HOURS AS NEEDED FOR MUSCLE SPASMS, Disp: 30 tablet, Rfl: 1    vitamin D (ERGOCALCIFEROL) 1.25 MG (13779 UT) capsule capsule, Take 1 capsule by mouth 1 (One) Time Per Week., Disp: 13 capsule, Rfl: 0    lidocaine (XYLOCAINE) 4 % external solution, Apply  topically to the appropriate area as directed As Needed for Mild Pain. OTC PATCH (Patient not taking: Reported on 5/1/2025), Disp: , Rfl:         Physical Exam:     Vitals:    05/01/25 0854   Weight: (!) 161 kg (355 lb)   Height: 167.6 cm (66\")   PainSc: 4    PainLoc: Back          General: Alert and oriented, No acute distress.   HEENT: " Normocephalic, atraumatic.   Cardiovascular: No gross edema.  Obese  Respiratory: Respirations are non-labored      Lumbar Spine:   No masses or atrophy  Range of motion - Flexion reduced. Extension reduced.    Facet Loading: Negative bilaterally  Facet Palpation - Nontender   Carlos finger/Gaenslen's/Mike's/JANINA/Thigh thrust -   Straight leg raise/slump test: Positive left  Multifidus toe-touch test:    Motor Exam:      Strength: Rate on 1-5 scale Right Left    L1/2- hip flexion 5/5  5/5    L3- knee extension 5/5  5/5    L4- ankle dorsiflexion 5/5  5/5    L5- great toe extension 5/5  5/5    S1- ankle plantarflexion 5/5  5/5    Sensory Exam: Altered sensation left S1 dermatome      Neurologic: Cranial Nerves II-XII are grossly intact.     Psychiatric: Cooperative.   Gait: Normal   Assistive Devices: None    Imaging Studies:   No results found for this or any previous visit.        Independent review of radiographic imaging: Available for my interpretation is a lumbar MRI dated August 20, 2024 demonstrating straightening of the normal lordosis of the lumbar spine; central posterior disc protrusion at L5/S1 with bilateral lateral recess stenosis; multifidus atrophy.    Impression & Plan:       09/16/2024: Maren Ray is a 39 y.o. female with past medical history significant for depression, anxiety, sleep apnea, GERD, fibromyalgia, morbid obesity who presents to the pain clinic for evaluation and treatment of left-sided low back pain radiation to left lower extremity and foot.  MRI interpreted as above.  Evaluation consistent with lumbar radiculopathy.  We discussed epidural steroid injection to improve pain.  If greater than 50% relief for at least 2-3 months can consider repeat as needed every 3 to 4 months.  I had a discussion with the patient regarding the risks of the procedure including bleeding, infection, damage to surrounding structures.  We discussed the potential adverse effects of  corticosteroid injection including flushing of the face, lipodystrophy, skin discoloration, elevated blood glucose, increased blood pressure.  Risks of frequent steroid administration include weight gain, hormonal changes, mood changes, osteoporosis.  10/31/2024: Good relief from LESI.  Could contemplate repeat but would be cautious given pain during last procedure.  Will assume responsibility of gabapentin from NSA.  Will uptitrate gabapentin  1/02/2025: Will refill gabapentin.  Agree with Robaxin.  Patient politely declined any repeat LESI at the moment.  5/1/2025: Good relief with gabapentin and conservative measures.  Will refill.    1. Lumbar radiculopathy              PLAN:  1. Medication Recommendations: Recommend Voltaren topical, NSAIDs, Tylenol.  Can trial turmeric 500 mg twice daily if NSAID contraindicated.  - Refill gabapentin  -Agree with Robaxin  -Gabapentin 600 mg 3 times daily, 90 pills, #4 refill  -As part of this patient's treatment plan, patient will be prescribed controlled substances. The patient has been made aware of appropriate use of such medications, including potential risk of somnolence, limited ability to drive and /or work safely, and potential for dependence or overdose. It has also been made clear that these medications are for use by this patient only, without concomitant use of alcohol or other substances unless prescribed.Controlled substance status of medication discussed with patient, discussed risks of medication including abuse potential and diversion potential and need to follow up for reevaluation appointment in order to receive further refills.  [place] was reviewed and compliant.    2. Physical Therapy: Continue HEP    3. Psychological: defer    4. Complementary and alternative (CAM) Therapies:     5. Labs/Diagnostic studies: UDS compliant; will obtain updated UDS at next office visit    6. Imaging: Reviewed updated lumbar MRI report    7. Interventions: Could consider  repeat left paramedian lumbar interlaminar epidural steroid injection (87719).  Previous success at L5/S1.  Would be cautious of repeat injection given patient's response and pain during the procedure      8. Referrals: None indicated     9. Records: Reviewed neurosurgical notes    10. Lifestyle goals:    Follow-up 5 months for medication management      [facility]  Therese Redman PA-C          Quality Metrics:

## 2025-08-07 DIAGNOSIS — M54.16 LUMBAR RADICULOPATHY: Primary | ICD-10-CM

## 2025-08-08 DIAGNOSIS — F32.A DEPRESSION, UNSPECIFIED DEPRESSION TYPE: ICD-10-CM

## 2025-08-08 DIAGNOSIS — F41.9 ANXIETY: ICD-10-CM

## 2025-08-11 ENCOUNTER — OFFICE VISIT (OUTPATIENT)
Dept: FAMILY MEDICINE CLINIC | Facility: CLINIC | Age: 40
End: 2025-08-11
Payer: COMMERCIAL

## 2025-08-11 VITALS
HEART RATE: 72 BPM | BODY MASS INDEX: 47.09 KG/M2 | OXYGEN SATURATION: 97 % | RESPIRATION RATE: 16 BRPM | SYSTOLIC BLOOD PRESSURE: 124 MMHG | TEMPERATURE: 97.1 F | DIASTOLIC BLOOD PRESSURE: 74 MMHG | HEIGHT: 66 IN | WEIGHT: 293 LBS

## 2025-08-11 DIAGNOSIS — M54.16 LUMBAR RADICULOPATHY: ICD-10-CM

## 2025-08-11 DIAGNOSIS — E55.9 VITAMIN D DEFICIENCY: ICD-10-CM

## 2025-08-11 DIAGNOSIS — Z00.00 ANNUAL PHYSICAL EXAM: Primary | ICD-10-CM

## 2025-08-11 DIAGNOSIS — R73.9 HYPERGLYCEMIA: ICD-10-CM

## 2025-08-11 DIAGNOSIS — E66.01 MORBID OBESITY: ICD-10-CM

## 2025-08-11 DIAGNOSIS — F32.A DEPRESSION, UNSPECIFIED DEPRESSION TYPE: ICD-10-CM

## 2025-08-11 DIAGNOSIS — G47.33 OSA ON CPAP: ICD-10-CM

## 2025-08-11 DIAGNOSIS — Z12.31 ENCOUNTER FOR SCREENING MAMMOGRAM FOR MALIGNANT NEOPLASM OF BREAST: ICD-10-CM

## 2025-08-11 DIAGNOSIS — F41.9 ANXIETY: ICD-10-CM

## 2025-08-11 PROCEDURE — 99214 OFFICE O/P EST MOD 30 MIN: CPT | Performed by: FAMILY MEDICINE

## 2025-08-11 PROCEDURE — 99396 PREV VISIT EST AGE 40-64: CPT | Performed by: FAMILY MEDICINE

## 2025-08-11 RX ORDER — LOTILANER OPHTHALMIC SOLUTION 2.5 MG/ML
SOLUTION/ DROPS OPHTHALMIC
COMMUNITY
Start: 2025-08-09

## 2025-08-11 RX ORDER — TIRZEPATIDE 2.5 MG/.5ML
2.5 INJECTION, SOLUTION SUBCUTANEOUS WEEKLY
Qty: 2 ML | Refills: 0 | Status: SHIPPED | OUTPATIENT
Start: 2025-08-11

## 2025-08-12 ENCOUNTER — PRIOR AUTHORIZATION (OUTPATIENT)
Dept: FAMILY MEDICINE CLINIC | Facility: CLINIC | Age: 40
End: 2025-08-12
Payer: COMMERCIAL

## 2025-08-12 LAB
25(OH)D3+25(OH)D2 SERPL-MCNC: 26.7 NG/ML (ref 30–100)
ALBUMIN SERPL-MCNC: 3.7 G/DL (ref 3.5–5.2)
ALBUMIN/GLOB SERPL: 1.3 G/DL
ALP SERPL-CCNC: 80 U/L (ref 39–117)
ALT SERPL-CCNC: 31 U/L (ref 1–33)
AST SERPL-CCNC: 32 U/L (ref 1–32)
BILIRUB SERPL-MCNC: 0.2 MG/DL (ref 0–1.2)
BUN SERPL-MCNC: 16 MG/DL (ref 6–20)
BUN/CREAT SERPL: 19.8 (ref 7–25)
CALCIUM SERPL-MCNC: 9.2 MG/DL (ref 8.6–10.5)
CHLORIDE SERPL-SCNC: 106 MMOL/L (ref 98–107)
CHOLEST SERPL-MCNC: 199 MG/DL (ref 0–200)
CHOLEST/HDLC SERPL: 6.63 {RATIO}
CO2 SERPL-SCNC: 24.6 MMOL/L (ref 22–29)
CREAT SERPL-MCNC: 0.81 MG/DL (ref 0.57–1)
EGFRCR SERPLBLD CKD-EPI 2021: 93.7 ML/MIN/1.73
GLOBULIN SER CALC-MCNC: 2.9 GM/DL
GLUCOSE SERPL-MCNC: 132 MG/DL (ref 65–99)
HBA1C MFR BLD: 5.9 % (ref 4.8–5.6)
HDLC SERPL-MCNC: 30 MG/DL (ref 40–60)
LDLC SERPL CALC-MCNC: 142 MG/DL (ref 0–100)
POTASSIUM SERPL-SCNC: 4.3 MMOL/L (ref 3.5–5.2)
PROT SERPL-MCNC: 6.6 G/DL (ref 6–8.5)
SODIUM SERPL-SCNC: 141 MMOL/L (ref 136–145)
TRIGL SERPL-MCNC: 147 MG/DL (ref 0–150)
TSH SERPL DL<=0.005 MIU/L-ACNC: 2.58 UIU/ML (ref 0.27–4.2)
VLDLC SERPL CALC-MCNC: 27 MG/DL (ref 5–40)